# Patient Record
Sex: MALE | Race: WHITE | NOT HISPANIC OR LATINO | Employment: OTHER | URBAN - METROPOLITAN AREA
[De-identification: names, ages, dates, MRNs, and addresses within clinical notes are randomized per-mention and may not be internally consistent; named-entity substitution may affect disease eponyms.]

---

## 2024-05-29 ENCOUNTER — APPOINTMENT (OUTPATIENT)
Dept: RADIOLOGY | Facility: HOSPITAL | Age: 54
End: 2024-05-29
Payer: COMMERCIAL

## 2024-05-29 ENCOUNTER — HOSPITAL ENCOUNTER (OUTPATIENT)
Facility: HOSPITAL | Age: 54
Setting detail: OBSERVATION
Discharge: HOME | End: 2024-05-31
Attending: EMERGENCY MEDICINE | Admitting: INTERNAL MEDICINE
Payer: COMMERCIAL

## 2024-05-29 DIAGNOSIS — E87.20 LACTIC ACIDOSIS: ICD-10-CM

## 2024-05-29 DIAGNOSIS — K43.9 VENTRAL HERNIA WITHOUT OBSTRUCTION OR GANGRENE: ICD-10-CM

## 2024-05-29 DIAGNOSIS — R10.9 ABDOMINAL PAIN, UNSPECIFIED ABDOMINAL LOCATION: Primary | ICD-10-CM

## 2024-05-29 DIAGNOSIS — R19.7 DIARRHEA, UNSPECIFIED TYPE: ICD-10-CM

## 2024-05-29 LAB
ALBUMIN SERPL-MCNC: 2.9 G/DL (ref 3.5–5)
ALP BLD-CCNC: 178 U/L (ref 35–125)
ALT SERPL-CCNC: 17 U/L (ref 5–40)
ANION GAP SERPL CALC-SCNC: 16 MMOL/L
APPEARANCE UR: CLEAR
AST SERPL-CCNC: 81 U/L (ref 5–40)
BACTERIA #/AREA URNS AUTO: ABNORMAL /HPF
BASOPHILS # BLD AUTO: 0.06 X10*3/UL (ref 0–0.1)
BASOPHILS NFR BLD AUTO: 0.8 %
BILIRUB DIRECT SERPL-MCNC: 0.5 MG/DL (ref 0–0.2)
BILIRUB SERPL-MCNC: 2.6 MG/DL (ref 0.1–1.2)
BILIRUB UR STRIP.AUTO-MCNC: NEGATIVE MG/DL
BUN SERPL-MCNC: <3 MG/DL (ref 8–25)
CALCIUM SERPL-MCNC: 8 MG/DL (ref 8.5–10.4)
CHLORIDE SERPL-SCNC: 94 MMOL/L (ref 97–107)
CO2 SERPL-SCNC: 21 MMOL/L (ref 24–31)
COLOR UR: ABNORMAL
CREAT SERPL-MCNC: 0.4 MG/DL (ref 0.4–1.6)
EGFRCR SERPLBLD CKD-EPI 2021: >90 ML/MIN/1.73M*2
EOSINOPHIL # BLD AUTO: 0.03 X10*3/UL (ref 0–0.7)
EOSINOPHIL NFR BLD AUTO: 0.4 %
ERYTHROCYTE [DISTWIDTH] IN BLOOD BY AUTOMATED COUNT: 15.9 % (ref 11.5–14.5)
ETHANOL SERPL-MCNC: 0.05 G/DL
GLUCOSE SERPL-MCNC: 142 MG/DL (ref 65–99)
GLUCOSE UR STRIP.AUTO-MCNC: NORMAL MG/DL
HCT VFR BLD AUTO: 37.9 % (ref 41–52)
HGB BLD-MCNC: 12.8 G/DL (ref 13.5–17.5)
IMM GRANULOCYTES # BLD AUTO: 0.03 X10*3/UL (ref 0–0.7)
IMM GRANULOCYTES NFR BLD AUTO: 0.4 % (ref 0–0.9)
KETONES UR STRIP.AUTO-MCNC: ABNORMAL MG/DL
LACTATE BLDV-SCNC: 1.2 MMOL/L (ref 0.4–2)
LACTATE BLDV-SCNC: 4.5 MMOL/L (ref 0.4–2)
LEUKOCYTE ESTERASE UR QL STRIP.AUTO: NEGATIVE
LIPASE SERPL-CCNC: <16 U/L (ref 16–63)
LYMPHOCYTES # BLD AUTO: 1.4 X10*3/UL (ref 1.2–4.8)
LYMPHOCYTES NFR BLD AUTO: 17.9 %
MCH RBC QN AUTO: 34.7 PG (ref 26–34)
MCHC RBC AUTO-ENTMCNC: 33.8 G/DL (ref 32–36)
MCV RBC AUTO: 103 FL (ref 80–100)
MONOCYTES # BLD AUTO: 0.67 X10*3/UL (ref 0.1–1)
MONOCYTES NFR BLD AUTO: 8.6 %
MUCOUS THREADS #/AREA URNS AUTO: ABNORMAL /LPF
NEUTROPHILS # BLD AUTO: 5.62 X10*3/UL (ref 1.2–7.7)
NEUTROPHILS NFR BLD AUTO: 71.9 %
NITRITE UR QL STRIP.AUTO: ABNORMAL
NRBC BLD-RTO: 0 /100 WBCS (ref 0–0)
PH UR STRIP.AUTO: 6 [PH]
PLATELET # BLD AUTO: 151 X10*3/UL (ref 150–450)
POTASSIUM SERPL-SCNC: 3.9 MMOL/L (ref 3.4–5.1)
PROT SERPL-MCNC: 7.3 G/DL (ref 5.9–7.9)
PROT UR STRIP.AUTO-MCNC: NEGATIVE MG/DL
RBC # BLD AUTO: 3.69 X10*6/UL (ref 4.5–5.9)
RBC # UR STRIP.AUTO: NEGATIVE /UL
RBC #/AREA URNS AUTO: ABNORMAL /HPF
SODIUM SERPL-SCNC: 131 MMOL/L (ref 133–145)
SP GR UR STRIP.AUTO: 1
UROBILINOGEN UR STRIP.AUTO-MCNC: NORMAL MG/DL
WBC # BLD AUTO: 7.8 X10*3/UL (ref 4.4–11.3)
WBC #/AREA URNS AUTO: ABNORMAL /HPF

## 2024-05-29 PROCEDURE — 85025 COMPLETE CBC W/AUTO DIFF WBC: CPT | Performed by: CLINICAL NURSE SPECIALIST

## 2024-05-29 PROCEDURE — 96375 TX/PRO/DX INJ NEW DRUG ADDON: CPT

## 2024-05-29 PROCEDURE — 87040 BLOOD CULTURE FOR BACTERIA: CPT | Mod: WESLAB | Performed by: CLINICAL NURSE SPECIALIST

## 2024-05-29 PROCEDURE — 87086 URINE CULTURE/COLONY COUNT: CPT | Mod: WESLAB | Performed by: CLINICAL NURSE SPECIALIST

## 2024-05-29 PROCEDURE — 83605 ASSAY OF LACTIC ACID: CPT | Performed by: CLINICAL NURSE SPECIALIST

## 2024-05-29 PROCEDURE — 2500000004 HC RX 250 GENERAL PHARMACY W/ HCPCS (ALT 636 FOR OP/ED): Performed by: CLINICAL NURSE SPECIALIST

## 2024-05-29 PROCEDURE — 36415 COLL VENOUS BLD VENIPUNCTURE: CPT | Performed by: CLINICAL NURSE SPECIALIST

## 2024-05-29 PROCEDURE — G0378 HOSPITAL OBSERVATION PER HR: HCPCS

## 2024-05-29 PROCEDURE — 80053 COMPREHEN METABOLIC PANEL: CPT | Performed by: CLINICAL NURSE SPECIALIST

## 2024-05-29 PROCEDURE — 96365 THER/PROPH/DIAG IV INF INIT: CPT

## 2024-05-29 PROCEDURE — 96366 THER/PROPH/DIAG IV INF ADDON: CPT

## 2024-05-29 PROCEDURE — 2500000004 HC RX 250 GENERAL PHARMACY W/ HCPCS (ALT 636 FOR OP/ED): Performed by: INTERNAL MEDICINE

## 2024-05-29 PROCEDURE — 74176 CT ABD & PELVIS W/O CONTRAST: CPT

## 2024-05-29 PROCEDURE — 82077 ASSAY SPEC XCP UR&BREATH IA: CPT | Performed by: CLINICAL NURSE SPECIALIST

## 2024-05-29 PROCEDURE — 82248 BILIRUBIN DIRECT: CPT | Performed by: INTERNAL MEDICINE

## 2024-05-29 PROCEDURE — 74176 CT ABD & PELVIS W/O CONTRAST: CPT | Performed by: RADIOLOGY

## 2024-05-29 PROCEDURE — 96361 HYDRATE IV INFUSION ADD-ON: CPT

## 2024-05-29 PROCEDURE — 81003 URINALYSIS AUTO W/O SCOPE: CPT | Performed by: CLINICAL NURSE SPECIALIST

## 2024-05-29 PROCEDURE — 83690 ASSAY OF LIPASE: CPT | Performed by: CLINICAL NURSE SPECIALIST

## 2024-05-29 PROCEDURE — 96368 THER/DIAG CONCURRENT INF: CPT

## 2024-05-29 PROCEDURE — 99291 CRITICAL CARE FIRST HOUR: CPT | Performed by: CLINICAL NURSE SPECIALIST

## 2024-05-29 RX ORDER — ONDANSETRON HYDROCHLORIDE 2 MG/ML
4 INJECTION, SOLUTION INTRAVENOUS ONCE
Status: COMPLETED | OUTPATIENT
Start: 2024-05-29 | End: 2024-05-29

## 2024-05-29 RX ORDER — MORPHINE SULFATE 2 MG/ML
2 INJECTION, SOLUTION INTRAMUSCULAR; INTRAVENOUS EVERY 4 HOURS PRN
Status: DISCONTINUED | OUTPATIENT
Start: 2024-05-29 | End: 2024-05-31 | Stop reason: HOSPADM

## 2024-05-29 RX ORDER — PANTOPRAZOLE SODIUM 40 MG/1
40 TABLET, DELAYED RELEASE ORAL
COMMUNITY
Start: 2023-11-13

## 2024-05-29 RX ORDER — MORPHINE SULFATE 4 MG/ML
4 INJECTION, SOLUTION INTRAMUSCULAR; INTRAVENOUS ONCE
Status: COMPLETED | OUTPATIENT
Start: 2024-05-29 | End: 2024-05-29

## 2024-05-29 RX ORDER — ALPRAZOLAM 1 MG/1
1 TABLET ORAL 2 TIMES DAILY PRN
COMMUNITY
Start: 2024-03-19

## 2024-05-29 RX ORDER — CEFTRIAXONE 1 G/50ML
1 INJECTION, SOLUTION INTRAVENOUS ONCE
Status: COMPLETED | OUTPATIENT
Start: 2024-05-29 | End: 2024-05-29

## 2024-05-29 RX ORDER — ESCITALOPRAM OXALATE 10 MG/1
1 TABLET ORAL
COMMUNITY
Start: 2022-10-25 | End: 2024-05-30 | Stop reason: ENTERED-IN-ERROR

## 2024-05-29 RX ORDER — IBUPROFEN 100 MG/5ML
1000 SUSPENSION, ORAL (FINAL DOSE FORM) ORAL DAILY
COMMUNITY

## 2024-05-29 RX ORDER — QUETIAPINE FUMARATE 100 MG/1
50 TABLET, FILM COATED ORAL NIGHTLY
COMMUNITY
Start: 2023-01-09 | End: 2024-05-30 | Stop reason: ENTERED-IN-ERROR

## 2024-05-29 RX ORDER — METRONIDAZOLE 500 MG/100ML
500 INJECTION, SOLUTION INTRAVENOUS ONCE
Status: COMPLETED | OUTPATIENT
Start: 2024-05-29 | End: 2024-05-29

## 2024-05-29 RX ORDER — METOPROLOL TARTRATE 25 MG/1
25 TABLET, FILM COATED ORAL 2 TIMES DAILY
COMMUNITY
Start: 2023-11-22

## 2024-05-29 RX ADMIN — SODIUM CHLORIDE 1200 ML: 900 INJECTION, SOLUTION INTRAVENOUS at 17:37

## 2024-05-29 RX ADMIN — SODIUM CHLORIDE 1000 ML: 900 INJECTION, SOLUTION INTRAVENOUS at 17:02

## 2024-05-29 RX ADMIN — MORPHINE SULFATE 4 MG: 4 INJECTION, SOLUTION INTRAMUSCULAR; INTRAVENOUS at 17:10

## 2024-05-29 RX ADMIN — MORPHINE SULFATE 2 MG: 2 INJECTION, SOLUTION INTRAMUSCULAR; INTRAVENOUS at 22:33

## 2024-05-29 RX ADMIN — CEFTRIAXONE SODIUM 1 G: 1 INJECTION, SOLUTION INTRAVENOUS at 18:04

## 2024-05-29 RX ADMIN — ONDANSETRON HYDROCHLORIDE 4 MG: 2 INJECTION INTRAMUSCULAR; INTRAVENOUS at 17:10

## 2024-05-29 RX ADMIN — METRONIDAZOLE 500 MG: 500 INJECTION, SOLUTION INTRAVENOUS at 18:31

## 2024-05-29 ASSESSMENT — LIFESTYLE VARIABLES
EVER FELT BAD OR GUILTY ABOUT YOUR DRINKING: NO
HAVE PEOPLE ANNOYED YOU BY CRITICIZING YOUR DRINKING: NO
EVER HAD A DRINK FIRST THING IN THE MORNING TO STEADY YOUR NERVES TO GET RID OF A HANGOVER: NO
HAVE YOU EVER FELT YOU SHOULD CUT DOWN ON YOUR DRINKING: NO
TOTAL SCORE: 0

## 2024-05-29 ASSESSMENT — PAIN SCALES - GENERAL: PAINLEVEL_OUTOF10: 10 - WORST POSSIBLE PAIN

## 2024-05-29 ASSESSMENT — PAIN DESCRIPTION - DESCRIPTORS: DESCRIPTORS: STABBING

## 2024-05-29 ASSESSMENT — PAIN - FUNCTIONAL ASSESSMENT: PAIN_FUNCTIONAL_ASSESSMENT: 0-10

## 2024-05-29 ASSESSMENT — PAIN DESCRIPTION - LOCATION: LOCATION: ABDOMEN

## 2024-05-29 NOTE — ED PROVIDER NOTES
Department of Emergency Medicine   ED  Provider Note  Admit Date/RoomTime: 5/29/2024  4:45 PM  [facility]        History of Present Illness:  Chief Complaint   Patient presents with    Abdominal Pain         Nils Hogan is a 54 y.o. male history of medical procedure, TIA, stroke presenting to the ED for abdominal pain diarrhea, started yesterday.  History of focal procedure status post patient reports cancer  Patient reports he had a fall couple of days ago.  Hitting his knee.  Denies hitting his head.  Reports that on Tuesday started having abdominal pain followed by diarrhea after eating meatballs that did not taste right but ate them anyway   From a party.  He denies chest pain.  No fever chills no cough congestion.  No pressure burning frequency with urination.  No nausea or vomiting patient has a history of liver procedure has a wound to his abdomen that has been present for the past 9 months.  Earlier today umbilicus area large.  Patient notes pain is all right mid quadrant alongside the hernia he denies hitting his abdomen when he fell  Review of Systems:   Pertinent positives and negatives are stated within HPI, all other systems reviewed and are negative.        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  Past Surgical History:  has a past surgical history that includes Adenoidectomy; Whipple procedure (04/22/2019); and Urethra surgery.  Social History:  reports that he has been smoking cigarettes. He started smoking about 37 years ago. He does not have any smokeless tobacco history on file. He reports current alcohol use.  Family History: family history includes Asthma in his son; Bipolar  disorder in his mother; Colon cancer in his paternal grandfather; Hyperlipidemia in his sister; Hypertension in his sister; Prostate cancer in his father; Stroke in his mother.. Unless otherwise noted, family history is non contributory  The patient’s home medications have been reviewed.  Allergies: Diazepam, Iodinated contrast media, Iodine, Penicillins, Acetaminophen, and Phenytoin sodium extended        ---------------------------------------------------PHYSICAL EXAM--------------------------------------    GENERAL APPEARANCE: Awake and alert.   VITAL SIGNS: As per the nurses' triage record.   HEENT: Normocephalic, atraumatic. Extraocular muscles are intact. Pupils equal round and reactive to light. Conjunctiva are pink. Negative scleral icterus. Mucous membranes are moist. Tongue in the midline. Pharynx was without erythema or exudates, uvula midline  NECK: Soft Nontender and supple, full gross ROM, no meningeal signs.  CHEST: Nontender to palpation. Clear to auscultation bilaterally. No rales, rhonchi, or wheezing.   HEART: S1, S2. Regular rate and rhythm. No murmurs, gallops or rubs.  Strong and equal pulses in the extremities.   ABDOMEN:   Large umbilical hernia open wound approximately 3 x 2 cm open wound.  No drainage or odor noted.  No lymphangitic streaking noted.  Limited exam patient is guarded hollers when the abdomen is palpated lightly.  Bowel sounds are distant  Back: No pain palpation of the cervical thoracic or lumbar spine  Paresthesias  MUSCULCSKELETAL: The calves are nontender to palpation. Full gross active range of motion.    NEUROLOGICAL: Awake, alert and oriented x 3. Power intact in the upper and lower extremities. Sensation is intact to light touch in the upper and lower extremities.   IMMUNOLOGICAL: No lymphatic streaking noted   DERM: No petechiae, rashes, or ecchymoses.          ------------------------- NURSING NOTES AND VITALS REVIEWED ---------------------------  The nursing notes  "within the ED encounter and vital signs as below have been reviewed by myself  /76   Pulse 91   Temp 37.1 °C (98.8 °F)   Resp 19   Ht 1.727 m (5' 8\")   Wt 73.5 kg (162 lb)   SpO2 97%   BMI 24.63 kg/m²     Oxygen Saturation Interpretation: 96% room air    The cardiac monitor revealed sinus tachycardia with a heart rate in the 100s as interpreted by me. The cardiac monitor was ordered secondary to the patient's heart rate and to monitor the patient for dysrhythmia.       The patient’s available past medical records and past encounters were reviewed.          -----------------------DIAGNOSTIC RESULTS------------------------  LABS:    Labs Reviewed   CBC WITH AUTO DIFFERENTIAL - Abnormal       Result Value    WBC 7.8      nRBC 0.0      RBC 3.69 (*)     Hemoglobin 12.8 (*)     Hematocrit 37.9 (*)      (*)     MCH 34.7 (*)     MCHC 33.8      RDW 15.9 (*)     Platelets 151      Neutrophils % 71.9      Immature Granulocytes %, Automated 0.4      Lymphocytes % 17.9      Monocytes % 8.6      Eosinophils % 0.4      Basophils % 0.8      Neutrophils Absolute 5.62      Immature Granulocytes Absolute, Automated 0.03      Lymphocytes Absolute 1.40      Monocytes Absolute 0.67      Eosinophils Absolute 0.03      Basophils Absolute 0.06     COMPREHENSIVE METABOLIC PANEL - Abnormal    Glucose 142 (*)     Sodium 131 (*)     Potassium 3.9      Chloride 94 (*)     Bicarbonate 21 (*)     Urea Nitrogen <3 (*)     Creatinine 0.40      eGFR >90      Calcium 8.0 (*)     Albumin 2.9 (*)     Alkaline Phosphatase 178 (*)     Total Protein 7.3      AST 81 (*)     Bilirubin, Total 2.6 (*)     ALT 17      Anion Gap 16     LIPASE - Abnormal    Lipase <16 (*)    BLOOD GAS LACTIC ACID, VENOUS - Abnormal    POCT Lactate, Venous 4.5 (*)    URINALYSIS WITH REFLEX CULTURE AND MICROSCOPIC - Abnormal    Color, Urine Light-Yellow      Appearance, Urine Clear      Specific Gravity, Urine 1.004 (*)     pH, Urine 6.0      Protein, Urine " NEGATIVE      Glucose, Urine Normal      Blood, Urine NEGATIVE      Ketones, Urine TRACE (*)     Bilirubin, Urine NEGATIVE      Urobilinogen, Urine Normal      Nitrite, Urine 2+ (*)     Leukocyte Esterase, Urine NEGATIVE     ALCOHOL - Abnormal    Alcohol 0.051 (*)    BILIRUBIN, DIRECT - Abnormal    Bilirubin, Direct 0.5 (*)    MICROSCOPIC ONLY, URINE - Abnormal    WBC, Urine 1-5      RBC, Urine 1-2      Bacteria, Urine 4+ (*)     Mucus, Urine FEW     BLOOD GAS LACTIC ACID, VENOUS - Normal    POCT Lactate, Venous 1.2     BLOOD CULTURE   BLOOD CULTURE   TISSUE/WOUND CULTURE/SMEAR   URINE CULTURE   URINALYSIS WITH REFLEX CULTURE AND MICROSCOPIC    Narrative:     The following orders were created for panel order Urinalysis with Reflex Culture and Microscopic.  Procedure                               Abnormality         Status                     ---------                               -----------         ------                     Urinalysis with Reflex C...[[id number]]  Abnormal            Final result               Extra Urine Gray Tube[[id number]]                            In process                   Please view results for these tests on the individual orders.   EXTRA URINE GRAY TUBE   CBC   HEPATIC FUNCTION PANEL   BASIC METABOLIC PANEL       As interpreted by me, the above displayed labs are abnormal. All other labs obtained during this visit were within normal range or not returned as of this dictation.            CT abdomen pelvis wo IV contrast   Final Result   Large ventral hernia containing fat and bowel.        Small umbilical hernia and adjacent right paraumbilical hernia.        Diffuse mesenteric edema with small volume ascites.        Diffuse colonic diverticulosis.        Status post Whipple procedure.        Questionable cirrhotic liver morphology.        Diffuse bladder wall thickening is again noted. Bilateral Hutch   diverticula are again noted.        Signed by: Diane Hernandez 5/29/2024 6:38 PM    Dictation workstation:   [id number]              CT abdomen pelvis wo IV contrast   Final Result   Large ventral hernia containing fat and bowel.        Small umbilical hernia and adjacent right paraumbilical hernia.        Diffuse mesenteric edema with small volume ascites.        Diffuse colonic diverticulosis.        Status post Whipple procedure.        Questionable cirrhotic liver morphology.        Diffuse bladder wall thickening is again noted. Bilateral Hutch   diverticula are again noted.        Signed by: Diane Hernandez 5/29/2024 6:38 PM   Dictation workstation:   [id number]              ------------------------------ ED COURSE/MEDICAL DECISION MAKING----------------------  Medical Decision Making:   Exam: A medically appropriate exam performed, outlined above, given the known history and presentation.    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       CC/HPI Summary, Social Determinants of health, Records Reviewed, DDx, testing done/not done, ED Course, Reassessment, disposition considerations/shared decision making with patient, consults, disposition:   Presents with abdominal pain after eating meatballs that did not taste normal.  History of Whipple procedure open wound   Plan  Rocephin  Flagyl  Morphine  Zofran  Normal saline 30 mg/kg  CT abdomen pelvis without contrast patient has allergy to the dye anaphylaxis.  Due to concern for obstruction obtained emergently: Large ventral hernia containing fat and bowel.  Small umbilical hernia and adjacent right paraumbilical hernia.  Diffuse mesenteric  edema with small volume ascites.  Diffuse colonic diverticulosis.  Status post Whipple procedure.  Questionable cirrhotic liver morphology.  Diffuse bladder wall thickening is again noted. Bilateral Hutch  diverticula are again noted.  Blood cultures  Lactic acid  CBC  CMP  Urine  Medical Decision Making/Differential Diagnosis:  Differentials include but not limited to hernia versus obstruction versus mass versus abscess versus UTI versus food poisoning  Reviewed  Lipase less than 16  Hemoglobin 12.8 glucose 142  Sodium 131  Chloride 94  Bicarb 21  BUN less than 3  Creatinine 0.4  Alkaline phosphatase elevated  Calcium 8  AST elevated 81  Total bili 2.6  , Lactic acid 4.5 repeat 1.2  Patient presented with abdominal pain diarrhea.  Open wound noted to the abdomen which is.  Evidence tender.  CT of the abdomen pelvis showed large ventral hernia containing fat and bowel, small umbilical hernia recent right periumbilical hernia, diffuse mesenteric edema with small volume ascites, diffuse colonic diverticulosis, status post Whipple procedure, questionable cirrhotic liver morphology, diffuse bladder wall thickening history of the same bilateral Hutch diverticula also noted history of the same.  Lactic acid was noted to be elevated at 4.5.  Patient received Rocephin and Flagyl secondary to Zosyn allergy of anaphylaxis.  CT of the abdomen pelvis was not obtained secondary to IV contrast dye allergy.  And emergent need for CAT scan to determine obstruction.  30 mg/kg IV fluids ordered white blood cell count is not elevated.  Hemoglobin 12.8.  No signs of active bleeding.  Sodium 131 chloride 94 patient not hypoglycemic.  Mild electrolyte imbalance noted.  Renal function improved.  Patient does have an elevated bili total 2.6 with elevated AST history of the same improved from baseline.  Alkaline phosphatase is elevated from baseline bili total elevated from baseline.  Patient is not jaundiced.  Urine was pending upon  admission.  Noted to have nitrites +4 bacteria patient did receive Zosyn for concern of abdominal infection.  Culture pending bili total 0.5 alcohol 0.051.  Abdominal pain after eating lactic acidosis did not improve limits unsure etiology of his lactic acidosis.  Patient continues to have abdominal pain.  Discussed case with general surgery.  By attending physician.  Due to his abdominal pain lactic acidosis will plan for admission for further evaluation and treatment and serial abdominal exams.  Patient also known to have cirrhosis with liver with elevated LFTs.  Patient seen and evaluated attending physician Dr. Love  Case discussed with hospitalist agreed admit the patient under his service for further evaluation and treatment.  PROCEDURES  Unless otherwise noted below, none      CONSULTS:   IP CONSULT TO ACUTE CARE SURGERY  Consult general surgery. Dr. Figueroa     ED Course as of 05/30/24 0041   Wed May 29, 2024   1718 Concern for obstruction.  Lactic acid 4.5.  Surgery has already been consulted pending CT results.  Added additional fluids to equal 30 mg/kg.  Antibiotics Zosyn vancomycin blood cultures pending [TB]   2024 Discussed case with hospitalist Dr. Schroeder is agreed to admit the patient under his service for further evaluation and treatment.  Request alcohol alcohol level be drawn.  This was ordered admit to stepdown bed  [TB]      ED Course User Index  [TB] DEJA Combs         Diagnoses as of 05/30/24 0041   Abdominal pain, unspecified abdominal location   Ventral hernia without obstruction or gangrene   Diarrhea, unspecified type   Lactic acidosis         This patient has remained hemodynamically stable during their ED course.      Critical Care: 31  Critical Care    Performed by: DEJA Combs  Authorized by: Latha Love DO    Critical care provider statement:     Critical care time (minutes):  31    Critical care time was exclusive of:  Separately billable procedures  and treating other patients and teaching time    Critical care was necessary to treat or prevent imminent or life-threatening deterioration of the following conditions:  Sepsis    Critical care was time spent personally by me on the following activities:  Ordering and performing treatments and interventions, ordering and review of laboratory studies, ordering and review of radiographic studies, pulse oximetry, re-evaluation of patient's condition, review of old charts, blood draw for specimens, development of treatment plan with patient or surrogate, discussions with consultants, evaluation of patient's response to treatment, examination of patient and interpretation of cardiac output measurements    Care discussed with: admitting provider      Critical care time for concern of sepsis and possible bowel obstruction  Initial presentation requiring IV fluids lactic acidosis noted blood cultures pending      Counseling:  The emergency provider has spoken with the patient and discussed today’s results, in addition to providing specific details for the plan of care and counseling regarding the diagnosis and prognosis.  Questions are answered at this time and they are agreeable with the plan.         --------------------------------- IMPRESSION AND DISPOSITION ---------------------------------    IMPRESSION  1. Abdominal pain, unspecified abdominal location    2. Ventral hernia without obstruction or gangrene    3. Diarrhea, unspecified type    4. Lactic acidosis        DISPOSITION  Disposition: Admit hospitalist service  Patient condition is stable        NOTE: This report was transcribed using voice recognition software. Every effort was made to ensure accuracy; however, inadvertent computerized transcription errors may be present      KIMBERLEY Combs-NITIN  05/30/24 0043

## 2024-05-29 NOTE — ED TRIAGE NOTES
Patient presents to the emergency department with abdominal pain. Patient states that he ate some meatballs that might've been old. Patient also has c/o diarrhea. Patient has hx of a stroke with left sided deficits, whipple procedure, and has a hernia on his abdomen that has been going on for the last 9 months.

## 2024-05-30 PROBLEM — K43.9 VENTRAL HERNIA WITHOUT OBSTRUCTION OR GANGRENE: Status: ACTIVE | Noted: 2024-05-30

## 2024-05-30 PROBLEM — R19.7 DIARRHEA: Status: ACTIVE | Noted: 2024-05-30

## 2024-05-30 PROBLEM — E87.20 LACTIC ACIDOSIS: Status: ACTIVE | Noted: 2024-05-30

## 2024-05-30 LAB
ALBUMIN SERPL-MCNC: 2.7 G/DL (ref 3.5–5)
ALP BLD-CCNC: 151 U/L (ref 35–125)
ALT SERPL-CCNC: 14 U/L (ref 5–40)
ANION GAP SERPL CALC-SCNC: 12 MMOL/L
AST SERPL-CCNC: 61 U/L (ref 5–40)
BILIRUB DIRECT SERPL-MCNC: 1.3 MG/DL (ref 0–0.2)
BILIRUB SERPL-MCNC: 2.4 MG/DL (ref 0.1–1.2)
BUN SERPL-MCNC: 5 MG/DL (ref 8–25)
CALCIUM SERPL-MCNC: 7.5 MG/DL (ref 8.5–10.4)
CHLORIDE SERPL-SCNC: 102 MMOL/L (ref 97–107)
CO2 SERPL-SCNC: 21 MMOL/L (ref 24–31)
CREAT SERPL-MCNC: 0.4 MG/DL (ref 0.4–1.6)
EGFRCR SERPLBLD CKD-EPI 2021: >90 ML/MIN/1.73M*2
ERYTHROCYTE [DISTWIDTH] IN BLOOD BY AUTOMATED COUNT: 15 % (ref 11.5–14.5)
GLUCOSE SERPL-MCNC: 91 MG/DL (ref 65–99)
HCT VFR BLD AUTO: 36.4 % (ref 41–52)
HGB BLD-MCNC: 12.3 G/DL (ref 13.5–17.5)
HOLD SPECIMEN: NORMAL
MCH RBC QN AUTO: 33.9 PG (ref 26–34)
MCHC RBC AUTO-ENTMCNC: 33.8 G/DL (ref 32–36)
MCV RBC AUTO: 100 FL (ref 80–100)
NRBC BLD-RTO: 0 /100 WBCS (ref 0–0)
PLATELET # BLD AUTO: 111 X10*3/UL (ref 150–450)
POTASSIUM SERPL-SCNC: 3.9 MMOL/L (ref 3.4–5.1)
PROT SERPL-MCNC: 6.6 G/DL (ref 5.9–7.9)
RBC # BLD AUTO: 3.63 X10*6/UL (ref 4.5–5.9)
SODIUM SERPL-SCNC: 135 MMOL/L (ref 133–145)
WBC # BLD AUTO: 5.7 X10*3/UL (ref 4.4–11.3)

## 2024-05-30 PROCEDURE — 97162 PT EVAL MOD COMPLEX 30 MIN: CPT | Mod: GP

## 2024-05-30 PROCEDURE — 85027 COMPLETE CBC AUTOMATED: CPT | Performed by: INTERNAL MEDICINE

## 2024-05-30 PROCEDURE — 80076 HEPATIC FUNCTION PANEL: CPT | Performed by: INTERNAL MEDICINE

## 2024-05-30 PROCEDURE — 99221 1ST HOSP IP/OBS SF/LOW 40: CPT | Performed by: NURSE PRACTITIONER

## 2024-05-30 PROCEDURE — G0378 HOSPITAL OBSERVATION PER HR: HCPCS

## 2024-05-30 PROCEDURE — 2500000004 HC RX 250 GENERAL PHARMACY W/ HCPCS (ALT 636 FOR OP/ED): Performed by: INTERNAL MEDICINE

## 2024-05-30 PROCEDURE — 96367 TX/PROPH/DG ADDL SEQ IV INF: CPT

## 2024-05-30 PROCEDURE — 96372 THER/PROPH/DIAG INJ SC/IM: CPT | Performed by: INTERNAL MEDICINE

## 2024-05-30 PROCEDURE — 2500000002 HC RX 250 W HCPCS SELF ADMINISTERED DRUGS (ALT 637 FOR MEDICARE OP, ALT 636 FOR OP/ED): Performed by: INTERNAL MEDICINE

## 2024-05-30 PROCEDURE — 97535 SELF CARE MNGMENT TRAINING: CPT | Mod: GO

## 2024-05-30 PROCEDURE — 36415 COLL VENOUS BLD VENIPUNCTURE: CPT | Performed by: INTERNAL MEDICINE

## 2024-05-30 PROCEDURE — 97166 OT EVAL MOD COMPLEX 45 MIN: CPT | Mod: GO

## 2024-05-30 PROCEDURE — 2500000001 HC RX 250 WO HCPCS SELF ADMINISTERED DRUGS (ALT 637 FOR MEDICARE OP): Performed by: INTERNAL MEDICINE

## 2024-05-30 PROCEDURE — 96376 TX/PRO/DX INJ SAME DRUG ADON: CPT

## 2024-05-30 RX ORDER — ENOXAPARIN SODIUM 100 MG/ML
40 INJECTION SUBCUTANEOUS DAILY
Status: DISCONTINUED | OUTPATIENT
Start: 2024-05-30 | End: 2024-05-31 | Stop reason: HOSPADM

## 2024-05-30 RX ORDER — METOPROLOL TARTRATE 25 MG/1
25 TABLET, FILM COATED ORAL 2 TIMES DAILY
Status: DISCONTINUED | OUTPATIENT
Start: 2024-05-30 | End: 2024-05-31 | Stop reason: HOSPADM

## 2024-05-30 RX ORDER — LANOLIN ALCOHOL/MO/W.PET/CERES
100 CREAM (GRAM) TOPICAL DAILY
Status: DISCONTINUED | OUTPATIENT
Start: 2024-05-30 | End: 2024-05-31 | Stop reason: HOSPADM

## 2024-05-30 RX ORDER — ASPIRIN 81 MG/1
81 TABLET ORAL DAILY PRN
COMMUNITY

## 2024-05-30 RX ORDER — ASCORBIC ACID 500 MG
1000 TABLET ORAL DAILY
Status: DISCONTINUED | OUTPATIENT
Start: 2024-05-30 | End: 2024-05-31 | Stop reason: HOSPADM

## 2024-05-30 RX ORDER — POLYETHYLENE GLYCOL 3350 17 G/17G
17 POWDER, FOR SOLUTION ORAL DAILY PRN
Status: DISCONTINUED | OUTPATIENT
Start: 2024-05-30 | End: 2024-05-31 | Stop reason: HOSPADM

## 2024-05-30 RX ORDER — ALPRAZOLAM 0.5 MG/1
1 TABLET ORAL 2 TIMES DAILY PRN
Status: DISCONTINUED | OUTPATIENT
Start: 2024-05-30 | End: 2024-05-31 | Stop reason: HOSPADM

## 2024-05-30 RX ORDER — PANTOPRAZOLE SODIUM 40 MG/1
40 TABLET, DELAYED RELEASE ORAL
Status: DISCONTINUED | OUTPATIENT
Start: 2024-05-30 | End: 2024-05-31 | Stop reason: HOSPADM

## 2024-05-30 RX ORDER — MEROPENEM 1 G/1
1 INJECTION, POWDER, FOR SOLUTION INTRAVENOUS EVERY 8 HOURS
Status: DISCONTINUED | OUTPATIENT
Start: 2024-05-30 | End: 2024-05-31 | Stop reason: HOSPADM

## 2024-05-30 RX ORDER — ACETAMINOPHEN 500 MG
2000 TABLET ORAL DAILY
COMMUNITY

## 2024-05-30 RX ORDER — ZINC GLUCONATE 50 MG
50 TABLET ORAL DAILY
COMMUNITY

## 2024-05-30 RX ORDER — QUETIAPINE FUMARATE 50 MG/1
50 TABLET, FILM COATED ORAL NIGHTLY
Status: DISCONTINUED | OUTPATIENT
Start: 2024-05-30 | End: 2024-05-31 | Stop reason: HOSPADM

## 2024-05-30 RX ORDER — PROMETHAZINE HYDROCHLORIDE 25 MG/1
25 TABLET ORAL EVERY 6 HOURS PRN
Status: DISCONTINUED | OUTPATIENT
Start: 2024-05-30 | End: 2024-05-31 | Stop reason: HOSPADM

## 2024-05-30 RX ORDER — TALC
3 POWDER (GRAM) TOPICAL NIGHTLY PRN
Status: DISCONTINUED | OUTPATIENT
Start: 2024-05-30 | End: 2024-05-31 | Stop reason: HOSPADM

## 2024-05-30 RX ORDER — MORPHINE SULFATE 4 MG/ML
4 INJECTION, SOLUTION INTRAMUSCULAR; INTRAVENOUS EVERY 4 HOURS PRN
Status: DISCONTINUED | OUTPATIENT
Start: 2024-05-30 | End: 2024-05-31 | Stop reason: HOSPADM

## 2024-05-30 RX ORDER — ESCITALOPRAM OXALATE 10 MG/1
10 TABLET ORAL
Status: DISCONTINUED | OUTPATIENT
Start: 2024-05-30 | End: 2024-05-31 | Stop reason: HOSPADM

## 2024-05-30 RX ORDER — PROMETHAZINE HYDROCHLORIDE 25 MG/1
25 SUPPOSITORY RECTAL EVERY 12 HOURS PRN
Status: DISCONTINUED | OUTPATIENT
Start: 2024-05-30 | End: 2024-05-31 | Stop reason: HOSPADM

## 2024-05-30 RX ADMIN — OXYCODONE HYDROCHLORIDE AND ACETAMINOPHEN 1000 MG: 500 TABLET ORAL at 08:58

## 2024-05-30 RX ADMIN — ENOXAPARIN SODIUM 40 MG: 40 INJECTION SUBCUTANEOUS at 08:58

## 2024-05-30 RX ADMIN — MEROPENEM 1 G: 1 INJECTION, POWDER, FOR SOLUTION INTRAVENOUS at 05:46

## 2024-05-30 RX ADMIN — MEROPENEM 1 G: 1 INJECTION, POWDER, FOR SOLUTION INTRAVENOUS at 12:22

## 2024-05-30 RX ADMIN — Medication 100 MG: at 08:58

## 2024-05-30 RX ADMIN — MORPHINE SULFATE 4 MG: 4 INJECTION, SOLUTION INTRAMUSCULAR; INTRAVENOUS at 16:56

## 2024-05-30 RX ADMIN — MORPHINE SULFATE 2 MG: 2 INJECTION, SOLUTION INTRAMUSCULAR; INTRAVENOUS at 05:46

## 2024-05-30 RX ADMIN — MEROPENEM 1 G: 1 INJECTION, POWDER, FOR SOLUTION INTRAVENOUS at 20:47

## 2024-05-30 RX ADMIN — MORPHINE SULFATE 4 MG: 4 INJECTION, SOLUTION INTRAMUSCULAR; INTRAVENOUS at 12:22

## 2024-05-30 RX ADMIN — METOPROLOL TARTRATE 25 MG: 25 TABLET, FILM COATED ORAL at 08:57

## 2024-05-30 RX ADMIN — ALPRAZOLAM 1 MG: 0.5 TABLET ORAL at 18:45

## 2024-05-30 RX ADMIN — METOPROLOL TARTRATE 25 MG: 25 TABLET, FILM COATED ORAL at 20:44

## 2024-05-30 RX ADMIN — PANTOPRAZOLE SODIUM 40 MG: 40 TABLET, DELAYED RELEASE ORAL at 08:58

## 2024-05-30 SDOH — ECONOMIC STABILITY: INCOME INSECURITY: HOW HARD IS IT FOR YOU TO PAY FOR THE VERY BASICS LIKE FOOD, HOUSING, MEDICAL CARE, AND HEATING?: HARD

## 2024-05-30 SDOH — HEALTH STABILITY: MENTAL HEALTH
STRESS IS WHEN SOMEONE FEELS TENSE, NERVOUS, ANXIOUS, OR CAN'T SLEEP AT NIGHT BECAUSE THEIR MIND IS TROUBLED. HOW STRESSED ARE YOU?: VERY MUCH

## 2024-05-30 SDOH — HEALTH STABILITY: PHYSICAL HEALTH: ON AVERAGE, HOW MANY MINUTES DO YOU ENGAGE IN EXERCISE AT THIS LEVEL?: 0 MIN

## 2024-05-30 SDOH — SOCIAL STABILITY: SOCIAL NETWORK: ARE YOU MARRIED, WIDOWED, DIVORCED, SEPARATED, NEVER MARRIED, OR LIVING WITH A PARTNER?: DIVORCED

## 2024-05-30 SDOH — SOCIAL STABILITY: SOCIAL NETWORK
DO YOU BELONG TO ANY CLUBS OR ORGANIZATIONS SUCH AS CHURCH GROUPS UNIONS, FRATERNAL OR ATHLETIC GROUPS, OR SCHOOL GROUPS?: NO

## 2024-05-30 SDOH — SOCIAL STABILITY: SOCIAL INSECURITY: WITHIN THE LAST YEAR, HAVE YOU BEEN HUMILIATED OR EMOTIONALLY ABUSED IN OTHER WAYS BY YOUR PARTNER OR EX-PARTNER?: NO

## 2024-05-30 SDOH — SOCIAL STABILITY: SOCIAL INSECURITY: DOES ANYONE TRY TO KEEP YOU FROM HAVING/CONTACTING OTHER FRIENDS OR DOING THINGS OUTSIDE YOUR HOME?: NO

## 2024-05-30 SDOH — ECONOMIC STABILITY: HOUSING INSECURITY
IN THE LAST 12 MONTHS, WAS THERE A TIME WHEN YOU DID NOT HAVE A STEADY PLACE TO SLEEP OR SLEPT IN A SHELTER (INCLUDING NOW)?: NO

## 2024-05-30 SDOH — SOCIAL STABILITY: SOCIAL INSECURITY
WITHIN THE LAST YEAR, HAVE YOU BEEN KICKED, HIT, SLAPPED, OR OTHERWISE PHYSICALLY HURT BY YOUR PARTNER OR EX-PARTNER?: NO

## 2024-05-30 SDOH — SOCIAL STABILITY: SOCIAL INSECURITY: ARE YOU OR HAVE YOU BEEN THREATENED OR ABUSED PHYSICALLY, EMOTIONALLY, OR SEXUALLY BY ANYONE?: NO

## 2024-05-30 SDOH — SOCIAL STABILITY: SOCIAL NETWORK
IN A TYPICAL WEEK, HOW MANY TIMES DO YOU TALK ON THE PHONE WITH FAMILY, FRIENDS, OR NEIGHBORS?: MORE THAN THREE TIMES A WEEK

## 2024-05-30 SDOH — ECONOMIC STABILITY: INCOME INSECURITY: IN THE LAST 12 MONTHS, WAS THERE A TIME WHEN YOU WERE NOT ABLE TO PAY THE MORTGAGE OR RENT ON TIME?: NO

## 2024-05-30 SDOH — ECONOMIC STABILITY: FOOD INSECURITY: WITHIN THE PAST 12 MONTHS, THE FOOD YOU BOUGHT JUST DIDN'T LAST AND YOU DIDN'T HAVE MONEY TO GET MORE.: NEVER TRUE

## 2024-05-30 SDOH — SOCIAL STABILITY: SOCIAL INSECURITY
WITHIN THE LAST YEAR, HAVE TO BEEN RAPED OR FORCED TO HAVE ANY KIND OF SEXUAL ACTIVITY BY YOUR PARTNER OR EX-PARTNER?: NO

## 2024-05-30 SDOH — ECONOMIC STABILITY: HOUSING INSECURITY: IN THE LAST 12 MONTHS, HOW MANY PLACES HAVE YOU LIVED?: 1

## 2024-05-30 SDOH — ECONOMIC STABILITY: FOOD INSECURITY: WITHIN THE PAST 12 MONTHS, YOU WORRIED THAT YOUR FOOD WOULD RUN OUT BEFORE YOU GOT MONEY TO BUY MORE.: NEVER TRUE

## 2024-05-30 SDOH — SOCIAL STABILITY: SOCIAL INSECURITY: HAS ANYONE EVER THREATENED TO HURT YOUR FAMILY OR YOUR PETS?: NO

## 2024-05-30 SDOH — SOCIAL STABILITY: SOCIAL NETWORK: HOW OFTEN DO YOU GET TOGETHER WITH FRIENDS OR RELATIVES?: MORE THAN THREE TIMES A WEEK

## 2024-05-30 SDOH — SOCIAL STABILITY: SOCIAL INSECURITY: WERE YOU ABLE TO COMPLETE ALL THE BEHAVIORAL HEALTH SCREENINGS?: YES

## 2024-05-30 SDOH — HEALTH STABILITY: MENTAL HEALTH: HOW OFTEN DO YOU HAVE 6 OR MORE DRINKS ON ONE OCCASION?: NEVER

## 2024-05-30 SDOH — SOCIAL STABILITY: SOCIAL INSECURITY: HAVE YOU HAD ANY THOUGHTS OF HARMING ANYONE ELSE?: NO

## 2024-05-30 SDOH — HEALTH STABILITY: MENTAL HEALTH: HOW OFTEN DO YOU HAVE A DRINK CONTAINING ALCOHOL?: NEVER

## 2024-05-30 SDOH — SOCIAL STABILITY: SOCIAL INSECURITY: DO YOU FEEL ANYONE HAS EXPLOITED OR TAKEN ADVANTAGE OF YOU FINANCIALLY OR OF YOUR PERSONAL PROPERTY?: NO

## 2024-05-30 SDOH — ECONOMIC STABILITY: TRANSPORTATION INSECURITY
IN THE PAST 12 MONTHS, HAS LACK OF TRANSPORTATION KEPT YOU FROM MEETINGS, WORK, OR FROM GETTING THINGS NEEDED FOR DAILY LIVING?: NO

## 2024-05-30 SDOH — SOCIAL STABILITY: SOCIAL NETWORK: HOW OFTEN DO YOU ATTENT MEETINGS OF THE CLUB OR ORGANIZATION YOU BELONG TO?: NEVER

## 2024-05-30 SDOH — SOCIAL STABILITY: SOCIAL NETWORK: HOW OFTEN DO YOU ATTEND CHURCH OR RELIGIOUS SERVICES?: NEVER

## 2024-05-30 SDOH — SOCIAL STABILITY: SOCIAL INSECURITY: ARE THERE ANY APPARENT SIGNS OF INJURIES/BEHAVIORS THAT COULD BE RELATED TO ABUSE/NEGLECT?: NO

## 2024-05-30 SDOH — ECONOMIC STABILITY: TRANSPORTATION INSECURITY
IN THE PAST 12 MONTHS, HAS THE LACK OF TRANSPORTATION KEPT YOU FROM MEDICAL APPOINTMENTS OR FROM GETTING MEDICATIONS?: NO

## 2024-05-30 SDOH — SOCIAL STABILITY: SOCIAL INSECURITY: HAVE YOU HAD THOUGHTS OF HARMING ANYONE ELSE?: NO

## 2024-05-30 SDOH — SOCIAL STABILITY: SOCIAL INSECURITY: WITHIN THE LAST YEAR, HAVE YOU BEEN AFRAID OF YOUR PARTNER OR EX-PARTNER?: NO

## 2024-05-30 SDOH — SOCIAL STABILITY: SOCIAL INSECURITY: DO YOU FEEL UNSAFE GOING BACK TO THE PLACE WHERE YOU ARE LIVING?: NO

## 2024-05-30 SDOH — ECONOMIC STABILITY: INCOME INSECURITY: IN THE PAST 12 MONTHS, HAS THE ELECTRIC, GAS, OIL, OR WATER COMPANY THREATENED TO SHUT OFF SERVICE IN YOUR HOME?: YES

## 2024-05-30 SDOH — HEALTH STABILITY: PHYSICAL HEALTH: ON AVERAGE, HOW MANY DAYS PER WEEK DO YOU ENGAGE IN MODERATE TO STRENUOUS EXERCISE (LIKE A BRISK WALK)?: 0 DAYS

## 2024-05-30 SDOH — HEALTH STABILITY: MENTAL HEALTH: HOW MANY STANDARD DRINKS CONTAINING ALCOHOL DO YOU HAVE ON A TYPICAL DAY?: PATIENT DOES NOT DRINK

## 2024-05-30 SDOH — SOCIAL STABILITY: SOCIAL INSECURITY: ABUSE: ADULT

## 2024-05-30 ASSESSMENT — COGNITIVE AND FUNCTIONAL STATUS - GENERAL
STANDING UP FROM CHAIR USING ARMS: TOTAL
MOVING FROM LYING ON BACK TO SITTING ON SIDE OF FLAT BED WITH BEDRAILS: A LOT
HELP NEEDED FOR BATHING: A LOT
DRESSING REGULAR UPPER BODY CLOTHING: A LOT
DRESSING REGULAR UPPER BODY CLOTHING: A LOT
DAILY ACTIVITIY SCORE: 13
HELP NEEDED FOR BATHING: A LOT
MOVING TO AND FROM BED TO CHAIR: TOTAL
TURNING FROM BACK TO SIDE WHILE IN FLAT BAD: TOTAL
CLIMB 3 TO 5 STEPS WITH RAILING: TOTAL
TOILETING: A LOT
PERSONAL GROOMING: A LITTLE
MOVING FROM LYING ON BACK TO SITTING ON SIDE OF FLAT BED WITH BEDRAILS: A LOT
EATING MEALS: A LITTLE
DAILY ACTIVITIY SCORE: 13
EATING MEALS: A LITTLE
CLIMB 3 TO 5 STEPS WITH RAILING: TOTAL
DAILY ACTIVITIY SCORE: 13
TURNING FROM BACK TO SIDE WHILE IN FLAT BAD: TOTAL
STANDING UP FROM CHAIR USING ARMS: TOTAL
TOILETING: A LOT
TURNING FROM BACK TO SIDE WHILE IN FLAT BAD: TOTAL
MOVING TO AND FROM BED TO CHAIR: TOTAL
PERSONAL GROOMING: A LITTLE
TOILETING: A LOT
DAILY ACTIVITIY SCORE: 13
TOILETING: A LOT
MOBILITY SCORE: 7
MOVING FROM LYING ON BACK TO SITTING ON SIDE OF FLAT BED WITH BEDRAILS: A LOT
CLIMB 3 TO 5 STEPS WITH RAILING: TOTAL
WALKING IN HOSPITAL ROOM: TOTAL
DRESSING REGULAR LOWER BODY CLOTHING: TOTAL
PERSONAL GROOMING: A LITTLE
MOVING TO AND FROM BED TO CHAIR: TOTAL
EATING MEALS: A LITTLE
CLIMB 3 TO 5 STEPS WITH RAILING: TOTAL
PERSONAL GROOMING: A LITTLE
MOBILITY SCORE: 7
PATIENT BASELINE BEDBOUND: NO
MOBILITY SCORE: 7
DRESSING REGULAR UPPER BODY CLOTHING: A LOT
DRESSING REGULAR LOWER BODY CLOTHING: TOTAL
DRESSING REGULAR UPPER BODY CLOTHING: A LOT
WALKING IN HOSPITAL ROOM: TOTAL
DRESSING REGULAR LOWER BODY CLOTHING: TOTAL
WALKING IN HOSPITAL ROOM: TOTAL
MOVING FROM LYING ON BACK TO SITTING ON SIDE OF FLAT BED WITH BEDRAILS: A LOT
WALKING IN HOSPITAL ROOM: TOTAL
MOVING TO AND FROM BED TO CHAIR: TOTAL
TURNING FROM BACK TO SIDE WHILE IN FLAT BAD: TOTAL
STANDING UP FROM CHAIR USING ARMS: TOTAL
DRESSING REGULAR LOWER BODY CLOTHING: TOTAL
EATING MEALS: A LITTLE
STANDING UP FROM CHAIR USING ARMS: TOTAL
MOBILITY SCORE: 7

## 2024-05-30 ASSESSMENT — PAIN - FUNCTIONAL ASSESSMENT
PAIN_FUNCTIONAL_ASSESSMENT: 0-10

## 2024-05-30 ASSESSMENT — LIFESTYLE VARIABLES
HOW OFTEN DO YOU HAVE 6 OR MORE DRINKS ON ONE OCCASION: LESS THAN MONTHLY
AUDIT-C TOTAL SCORE: 0
HOW MANY STANDARD DRINKS CONTAINING ALCOHOL DO YOU HAVE ON A TYPICAL DAY: 1 OR 2
AUDIT-C TOTAL SCORE: 2
HOW OFTEN DO YOU HAVE A DRINK CONTAINING ALCOHOL: MONTHLY OR LESS
SKIP TO QUESTIONS 9-10: 1
SUBSTANCE_ABUSE_PAST_12_MONTHS: NO
PRESCIPTION_ABUSE_PAST_12_MONTHS: NO
SKIP TO QUESTIONS 9-10: 0
AUDIT-C TOTAL SCORE: 2

## 2024-05-30 ASSESSMENT — ACTIVITIES OF DAILY LIVING (ADL)
JUDGMENT_ADEQUATE_SAFELY_COMPLETE_DAILY_ACTIVITIES: YES
DRESSING YOURSELF: INDEPENDENT
HOME_MANAGEMENT_TIME_ENTRY: 8
BATHING_ASSISTANCE: MAXIMAL
WALKS IN HOME: INDEPENDENT
ADEQUATE_TO_COMPLETE_ADL: YES
ASSISTIVE_DEVICE: NONE;WALKER
ADL_ASSISTANCE: INDEPENDENT
BATHING: INDEPENDENT
ADL_ASSISTANCE: INDEPENDENT
HEARING - LEFT EAR: FUNCTIONAL
TOILETING: INDEPENDENT
FEEDING YOURSELF: INDEPENDENT
HEARING - RIGHT EAR: FUNCTIONAL
PATIENT'S MEMORY ADEQUATE TO SAFELY COMPLETE DAILY ACTIVITIES?: YES
GROOMING: INDEPENDENT
LACK_OF_TRANSPORTATION: NO

## 2024-05-30 ASSESSMENT — ENCOUNTER SYMPTOMS
MUSCULOSKELETAL NEGATIVE: 1
CONSTITUTIONAL NEGATIVE: 1
ENDOCRINE NEGATIVE: 1
EYES NEGATIVE: 1
CARDIOVASCULAR NEGATIVE: 1
NEUROLOGICAL NEGATIVE: 1
PSYCHIATRIC NEGATIVE: 1
ABDOMINAL PAIN: 1
ALLERGIC/IMMUNOLOGIC NEGATIVE: 1
RESPIRATORY NEGATIVE: 1
HEMATOLOGIC/LYMPHATIC NEGATIVE: 1

## 2024-05-30 ASSESSMENT — PAIN SCALES - GENERAL
PAINLEVEL_OUTOF10: 6
PAINLEVEL_OUTOF10: 8
PAINLEVEL_OUTOF10: 10 - WORST POSSIBLE PAIN
PAINLEVEL_OUTOF10: 10 - WORST POSSIBLE PAIN
PAINLEVEL_OUTOF10: 4
PAINLEVEL_OUTOF10: 4

## 2024-05-30 ASSESSMENT — PAIN DESCRIPTION - DESCRIPTORS
DESCRIPTORS: ACHING;STABBING
DESCRIPTORS: THROBBING;STABBING
DESCRIPTORS: ACHING;STABBING

## 2024-05-30 ASSESSMENT — PATIENT HEALTH QUESTIONNAIRE - PHQ9
SUM OF ALL RESPONSES TO PHQ9 QUESTIONS 1 & 2: 0
2. FEELING DOWN, DEPRESSED OR HOPELESS: NOT AT ALL
1. LITTLE INTEREST OR PLEASURE IN DOING THINGS: NOT AT ALL

## 2024-05-30 NOTE — CONSULTS
"Assessment/Plan   Large hernia without clinical or imaging signs of obstruction or ischemia. 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 Discussed with Dr Stallings.  recommends Hernia specialist referral either with  or [facility] for OP follow up. No urgent surgical indications at this time. May have a diet. Daily dressings to abdominal wound. Discussed with Dr. Sun  Inpatient consult to Acute Care Surgery  Consult performed by: KIMBERLEY Wall-CNP  Consult ordered by: Rowan Schroeder MD        Subjective     Pt presents with vague complaints of abdominal pain. He currently has a large incisional hernia with skin ulceration. Past surgical history of Whipple procedure 4/22/2019 at [facility]. He lifted a heavy object and developed a hernia, for which he has had since 2022. He noticed skin ulceration over his incisional hernia commencing approximately 8-9 months ago. Some foul smelling drainage and  \" skin that doesn't want to heal\". Pain to right abdomen flank region 10/10 dull and achy after he sustained a fall doing work around his house and muscle strain after a fall, and which it took a bit of effort to get back on to his feet. He denies abdominal trauma . Denies fever or chills. No n/v. Has had +  bowel function. Has had loose stools since eating out over Memorial Day holiday.     He does not take blood thinners      Abdominal Pain      Past Medical History  Medical History        Past Medical History:   Diagnosis Date    Bipolar I disorder (Multi)      CVA (cerebral vascular accident) (Multi)      Duodenal cancer (Multi) 03/2019    Epilepsy (Multi)      Hyperlipidemia      Hypertension      Tobacco use      Urethral stricture              Surgical History  Surgical History[]Expand by Default         Past Surgical History:   Procedure Laterality Date    ADENOIDECTOMY        URETHRA SURGERY         Urethral stricture repair    WHIPPLE " PROCEDURE   04/22/2019     Diagnostic laparoscopy, open laparotomy for pylorus-preserving pancreaticoduodenectomy, pancreaticojejunostomy, hepaticojejunostomy, gastrojejunostomy and liver biopsy            Social History  He reports that he has been smoking cigarettes. He started smoking about 37 years ago. He does not have any smokeless tobacco history on file. He reports current alcohol use. No history on file for drug use.     Family History  Family History          Family History   Problem Relation Name Age of Onset    Stroke Mother        Bipolar disorder Mother        Prostate cancer Father        Hypertension Sister        Hyperlipidemia Sister        Asthma Son        Colon cancer Paternal Grandfather                     Review of Systems  Review of Systems   Constitutional: Negative.    HENT: Negative.     Eyes: Negative.    Respiratory: Negative.     Cardiovascular: Negative.    Gastrointestinal:  Positive for abdominal pain.   Endocrine: Negative.    Genitourinary: Negative.    Musculoskeletal: Negative.    Skin: Negative.    Allergic/Immunologic: Negative.    Neurological: Negative.    Hematological: Negative.    Psychiatric/Behavioral: Negative.         Objective     Vital signs for last 24 hours:  Temperature:  [37.1 °C (98.8 °F)] 37.1 °C (98.8 °F)  Heart Rate:  [] 67  Respirations:  [17-19] 18  BP: (121-152)/(68-83) 152/83    Intake/Output this shift:  No intake/output data recorded.    Physical Exam  Physical Exam  Constitutional:       Appearance: Normal appearance.   HENT:      Head: Normocephalic and atraumatic.      Right Ear: Tympanic membrane normal.      Nose: Nose normal.      Mouth/Throat:      Mouth: Mucous membranes are moist.   Eyes:      Pupils: Pupils are equal, round, and reactive to light.   Cardiovascular:      Rate and Rhythm: Normal rate and regular rhythm.      Pulses: Normal pulses.   Pulmonary:      Effort: Pulmonary effort is normal.      Breath sounds: Normal breath  sounds.   Abdominal:      General: Bowel sounds are normal. There is no distension.      Palpations: Abdomen is soft.      Tenderness: There is no abdominal tenderness. There is no guarding.      Hernia: No hernia is present.      Comments: Large incisional hernia with overlying ulceration of skin measuring 5 x 1.75 cm. No drainage currently.     Diffuse abdominal tenderness   Genitourinary:     Rectum: Normal.   Musculoskeletal:         General: Normal range of motion.   Skin:     General: Skin is warm and dry.   Neurological:      General: No focal deficit present.      Mental Status: He is alert.   Psychiatric:         Mood and Affect: Mood normal.         Behavior: Behavior normal.         Labs  CBC:   Lab Results   Component Value Date    WBC 5.7 05/30/2024    RBC 3.63 (L) 05/30/2024     BMP:   Lab Results   Component Value Date    GLUCOSE 91 05/30/2024    CO2 21 (L) 05/30/2024    BUN 5 (L) 05/30/2024    CREATININE 0.40 05/30/2024    CALCIUM 7.5 (L) 05/30/2024

## 2024-05-30 NOTE — PROGRESS NOTES
Nils Hogan is a 54 y.o. male on day 0 of admission presenting with Abdominal pain.      Subjective   Patient complain of abdominal pain.  Patient denied fever or chills.  Patient stated he got morphine but is not helping much.  He denied nausea or vomiting.         Objective     Last Recorded Vitals  /83   Pulse 67   Temp 37.1 °C (98.8 °F)   Resp 18   Wt 73.5 kg (162 lb)   SpO2 96%   Intake/Output last 3 Shifts:  No intake or output data in the 24 hours ending 05/30/24 1103    Admission Weight  Weight: 73.5 kg (162 lb) (05/29/24 1654)    Daily Weight  05/29/24 : 73.5 kg (162 lb)    Image Results  CT abdomen pelvis wo IV contrast  Narrative: Interpreted By:  Diane Hernandez,   STUDY:  CT ABDOMEN PELVIS WO IV CONTRAST;  5/29/2024 5:47 pm      INDICATION:  Signs/Symptoms:abdominal pain.      COMPARISON:  03/26/2019      ACCESSION NUMBER(S):  [id number]      ORDERING CLINICIAN:  OSMIN KELLOGG      TECHNIQUE:  CT of the abdomen and pelvis was performed without IV contrast.  Sagittal and coronal reconstructions.      FINDINGS:  Limited evaluation for solid organs and vasculature without  intravenous contrast.      Lower Chest: Streaky bibasilar, lingular, and right middle lobe  atelectasis. Coronary artery calcifications.      Liver: There are several small hypodensities throughout the liver,  too small to characterize. Enlargement of the left hepatic lobe.  Questionable surface nodularity in the liver. Calcified granulomas in  the liver.      Gallbladder and Biliary: Status post Whipple procedure.      Pancreas: Atrophic pancreas. Status post Whipple procedure.      Spleen: No abnormality identified in the spleen.      Adrenals: No abnormality identified in either adrenal gland.      Urinary: No parenchymal abnormality identified in either kidney. No  hydronephrosis. Diffuse bladder wall thickening. Bilateral Hutch  diverticula are again noted.      Gastrointestinal/Peritoneum: No small or large bowel  obstruction in  the visualized abdomen. In the abdomen, there is no extraluminal air.  Small volume diffuse ascites. Diffuse mesenteric edema. Diffuse  colonic diverticulosis. The appendix is not visualized.      Vascular: Abdominal aorta is normal in caliber. Atherosclerosis.      Lymphatics: No enlarged lymph nodes by size criteria.      MSK/Body Wall: No aggressive bony lesion identified. Multilevel  degenerative change in the spine. Small fat containing bilateral  inguinal hernias. Large upper abdominal ventral hernia containing fat  and bowel loops, the neck measuring 8.4 cm, the sac measuring 17 cm.  Small fat containing umbilical hernia. Additional small hernia just  to the right of the umbilicus with the neck measuring 1.9 cm and sac  measuring 2.8 cm with internal stranding and fluid.      Impression: Large ventral hernia containing fat and bowel.      Small umbilical hernia and adjacent right paraumbilical hernia.      Diffuse mesenteric edema with small volume ascites.      Diffuse colonic diverticulosis.      Status post Whipple procedure.      Questionable cirrhotic liver morphology.      Diffuse bladder wall thickening is again noted. Bilateral Hutch  diverticula are again noted.      Signed by: Diane Hernandez 5/29/2024 6:38 PM  Dictation workstation:   [id number]    Physical Exam    General: Moderate distress, cooperating during physical exam.  HEENT: Pupils are equal and reactive to light and commendation , oral mucosa moist, no JVD .  Cardiovascular: Normal sinus rhythm, no MRG.  Lungs: Clear to auscultation bilaterally, no wheezing, no crackles, no dullness to percussion.  Abdomen: Abdominal hernia, positive bowel sounds ,no hepatosplenomegaly appreciated, slight erythema, no purulent discharge , atrophic scar from previous surgery  Neuro: Alert and oriented x3, strength in upper and lower extremities , sensation intact.  Musculoskeletal: No swelling in lower extremities, no limitation in range  of motion.  Vascular: Pulses are intact in upper and lower extremities  Skin: No petechiae, ecchymosis or other stigmata for dermatology disease.     Assessment/Plan      Abdominal pain.  CT abdomen revealed large ventral hernia,Small umbilical hernia and right  Umbilical hernia diffuse mesenteric edema  Waiting for surgery to see him today.  Continue with pain medication.  Cover empirically with IV antibiotics  Patient is on meropenem.    Ventral hernia  Not obstruction    Hyperbilirubinemia  Monitor close    Hyponatremia   Resolved    History of duodenal cancer  Status post Whipple procedure done in 2019 at [facility].    Tobacco use  Advised to quit smoking    Treat symptomatically  Waiting for surgery to see him today  Continue with pain medication as needed    Check CBC and CMP in AM.  Time spent with patient 35 minutes  Principal Problem:    Abdominal pain  Active Problems:    Lactic acidosis    Diarrhea    Ventral hernia without obstruction or gangrene                  Anthony Sun MD

## 2024-05-30 NOTE — PROGRESS NOTES
Physical Therapy    Physical Therapy Evaluation    Patient Name: Nils Hogan  MRN: [id number]  Today's Date: 5/30/2024   Time Calculation  Start Time: 0831  Stop Time: 0851  Time Calculation (min): 20 min    Assessment/Plan   PT Assessment  PT Assessment Results: Decreased strength, Decreased range of motion, Decreased endurance, Impaired balance, Decreased mobility, Decreased coordination, Decreased cognition, Impaired judgement, Decreased safety awareness, Impaired tone, Decreased skin integrity, Pain  Rehab Prognosis: Good  Barriers to Discharge: medical status  Evaluation/Treatment Tolerance: Patient limited by fatigue, Patient limited by pain  Medical Staff Made Aware: Yes  Strengths: Housing layout  Barriers to Participation: Comorbidities, Coping skills, Insight into problems, Support of Caregivers  End of Session Communication: Bedside nurse  Assessment Comment: Pt demonstrates impaired functional mobility from baseline level; pt with pain, impaired BLE strength/ROM, impaired balance, decreased overall activity tolerance, and impaired safety awareness; pt is a high falls risk at this time and is not safe to return home alone.  End of Session Patient Position: On cart, Bed, 2 rail up (RN in room)  IP OR SWING BED PT PLAN  Inpatient or Swing Bed: Inpatient  PT Plan  Treatment/Interventions: Bed mobility, Transfer training, Gait training, Balance training, Neuromuscular re-education, Strengthening, Endurance training, Range of motion, Therapeutic exercise, Therapeutic activity  PT Plan: Skilled PT  PT Frequency: 4 times per week  PT Discharge Recommendations: Moderate intensity level of continued care  Equipment Recommended upon Discharge: Wheeled walker  PT Recommended Transfer Status: Assist x2  PT - OK to Discharge: Yes    Subjective   General Visit Information:  General  Reason for Referral: impaired functional mobility; pt is a 54 year-old M admitted from home with c/o abdominal pain, fall, and drainage  from a wound; CT abdomen (+) ventral hernia and mesenteric edema; pt awaiting sx consult.  Referred By: Dr. 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  Family/Caregiver Present: No  Co-Treatment: OT  Co-Treatment Reason: initial evaluation of pt in ED; co-evaluation of pt to maximize pt outcomes and need for two-person skilled assist for safe pt handling.  Prior to Session Communication: Bedside nurse  Patient Position Received: Bed, 2 rail up, On cart  Preferred Learning Style: verbal  General Comment: Pt cleared for therapy via RN, received in supine, anxious but agreeable to participate in therapy.  Home Living:  Home Living  Type of Home: Mobile home  Lives With: Alone  Home Adaptive Equipment: Walker rolling or standard (rollator)  Home Layout: One level  Home Access: Ramped entrance  Bathroom Shower/Tub: Tub/shower unit  Bathroom Toilet: Standard  Bathroom Equipment: Grab bars in shower, Shower chair with back  Prior Level of Function:  Prior Function Per Pt/Caregiver Report  Level of [place]: Independent with ADLs and functional transfers, Needs assistance with homemaking  Receives Help From: Friends (friends assist with transportation)  ADL Assistance: Independent  Homemaking Assistance: Needs assistance  Driving/Transportation: Family/Friend  Homemaking Assistance Comments: meal assistance program for all meals/groceries; pt reports independence with cleaning/laundry  Ambulatory Assistance: Independent (mod indep with rollator)  Hand Dominance: Left  Prior Function Comments: h/o ~5 recent falls  Precautions:  Precautions  Hearing/Visual Limitations: no glasses, reports mild visual deficits  Medical Precautions: Fall precautions  Vital Signs:  Vital Signs  Pulse Ox: 96 %    Objective   Pain:  Pain Assessment  Pain Assessment: 0-10  Pain Score: 10 - Worst possible pain  Pain Type:  Acute pain  Pain Location: Abdomen  Pain Interventions: Repositioned, Other (Comment) (pt reports just received pain meds)  Cognition:  Cognition  Overall Cognitive Status: Impaired  Following Commands: Follows one step commands with repetition  Safety Judgment: Decreased awareness of need for assistance  Cognition Comments: A&O x 2-3; limited insight; anxious throughout session; dysarthric from previous CVA  Insight: Moderate    General Assessments:  General Observation  General Observation: anxious but cooperative; disheveled appearance, incontinent of urine (RN notified)     Activity Tolerance  Endurance: Decreased tolerance for upright activites  Activity Tolerance Comments: c/o dizziness with lying flat; anxious in sitting on EOB and unable to maintain > 10 seconds    Sensation  Light Touch: Partial deficits in the RLE, Partial deficits in the LLE  Sensation Comment: pt reports chronic paresthesias BLE    Strength  Strength Comments: BLE > 3-/5 (difficult to formally assess d/t pt anxiety/poor activity tolerance)  Strength  Strength Comments: BLE > 3-/5 (difficult to formally assess d/t pt anxiety/poor activity tolerance)    Coordination  Movements are Fluid and Coordinated: No  Coordination Comment: coordination grossly decreased throughout, with BUE/LE tremoring with movement    Postural Control  Postural Control: Impaired  Posture Comment: forward head, rounded shoulders    Static Sitting Balance  Static Sitting-Balance Support: Bilateral upper extremity supported  Static Sitting-Level of Assistance: Maximum assistance  Static Sitting-Comment/Number of Minutes: max assist x 1-2 to maintain static seated balance on EOB; pt able to maintain static long sitting in bed with supervision for safety     Functional Assessments:     Bed Mobility  Bed Mobility: Yes  Bed Mobility 1  Bed Mobility 1: Supine to sitting  Level of Assistance 1: Moderate assistance, +2  Bed Mobility Comments 1: assist for trunk elevation and  advancement of BLE off EOB  Bed Mobility 2  Bed Mobility  2: Sitting to supine  Level of Assistance 2: Moderate assistance, +2  Bed Mobility Comments 2: assist for controlled trunk descent and lifting BLE onto bed  Bed Mobility 3  Bed Mobility 3: Scooting  Level of Assistance 3: Close supervision  Bed Mobility Comments 3: pt able to scoot towards HOB with cueing for sequencing and use of BUE on bedrails    Transfers  Transfer: No (unable to safely attempt sit <> stand transfer at this time d/t poor static seated balance on EOB and pt anxiety)    Ambulation/Gait Training  Ambulation/Gait Training Performed: No    Stairs  Stairs: No    Extremity/Trunk Assessments:  RLE   RLE :  (AROM limited at end ranges)  LLE   LLE :  (AROM limited at end ranges; per chart, pt with h/o L-sided deficits from previous CVA; difficult to formally assess d/t pt anxiety/poor activity tolerance)  Outcome Measures:  [facility] Basic Mobility  Turning from your back to your side while in a flat bed without using bedrails: A lot  Moving from lying on your back to sitting on the side of a flat bed without using bedrails: Total  Moving to and from bed to chair (including a wheelchair): Total  Standing up from a chair using your arms (e.g. wheelchair or bedside chair): Total  To walk in hospital room: Total  Climbing 3-5 steps with railing: Total  Basic Mobility - Total Score: 7    Encounter Problems       Encounter Problems (Active)       Balance       STG - Maintains static standing balance with upper extremity support and supervision for safety. (Not Progressing)       Start:  05/30/24    Expected End:  06/28/24       INTERVENTIONS:  1. Practice standing with minimal support.  2. Educate patient about standing tolerance.  3. Educate patient about independence with gait, transfers, and ADL's.  4. Educate patient about use of assistive device.  5. Educate patient about self-directed care.         STG - Maintains static sitting balance with upper  extremity support and modified independence. (Progressing)       Start:  05/30/24    Expected End:  06/28/24       INTERVENTIONS:  1. Practice sitting on the edge of a bed/mat with minimal support.  2. Educate patient about maintining total hip precautions while maintaining balance.  3. Educate patient about pressure relief.  4. Educate patient about use of assistive device.            Mobility       STG - Patient will ambulate 50' with use of rolling walker and supervision for safety. (Not Progressing)       Start:  05/30/24    Expected End:  06/28/24               PT Transfers       STG - Patient to transfer to and from sit to supine with modified independence. (Progressing)       Start:  05/30/24    Expected End:  06/28/24            STG - Patient will transfer sit to and from stand with use of rolling walker and supervision for safety. (Not Progressing)       Start:  05/30/24    Expected End:  06/28/24               Pain          Safety       STG - Patient uses call light consistently to request assistance with transfers (Progressing)       Start:  05/30/24    Expected End:  06/28/24                   Education Documentation  Mobility Training, taught by Yari Aguilera, PT at 5/30/2024  9:31 AM.  Learner: Patient  Readiness: Acceptance  Method: Explanation, Demonstration  Response: Needs Reinforcement    Education Comments  No comments found.

## 2024-05-30 NOTE — PROGRESS NOTES
05/30/24 0834   Discharge Planning   Living Arrangements Alone   Support Systems Friends/neighbors   Type of Residence Private residence   Number of Stairs to Enter Residence 0  (pt has a ramp)   Number of Stairs Within Residence 0   Do you have animals or pets at home? No   Who is requesting discharge planning? Provider   Home or Post Acute Services None   Patient expects to be discharged to: Home   Does the patient need discharge transport arranged? Yes   RoundTrip coordination needed? Yes   Has discharge transport been arranged? No   Financial Resource Strain   How hard is it for you to pay for the very basics like food, housing, medical care, and heating? Not hard   Housing Stability   In the last 12 months, was there a time when you were not able to pay the mortgage or rent on time? N   In the last 12 months, how many places have you lived? 1   In the last 12 months, was there a time when you did not have a steady place to sleep or slept in a shelter (including now)? N   Transportation Needs   In the past 12 months, has lack of transportation kept you from medical appointments or from getting medications? yes  (pt re-schedules if he feels it is important)   In the past 12 months, has lack of transportation kept you from meetings, work, or from getting things needed for daily living? No   Patient Choice   Patient / Family choosing to utilize agency / facility established prior to hospitalization No

## 2024-05-30 NOTE — CARE PLAN
"Pt does not have a POA or Living Will    ADOD: 2 days    Pt lives alone in a mobile home; it has a ramp; he uses a rollator; he had a CVA in 2018; he has some slurred speech, weakness. He also has a past hx of duodenal CA and had abdominal surgery in 2019 (Whipple procedure)  He does not drive; his friends drive him to his appointments at times. He has used his insurance co for transportation but they take hrs to pick him up after the appointment.  Number for [place] and [place] given to pt  He has  a lift chair at home that he sleeps in, no home 02/cpap/bipap. He has a nebulizer that he used after he developed pneumonia in 2019.  He has a hx of anxiety and depression, he is being tx for it; denies S. I  He does not wear glasses but he said that he needs them; no hearing aids.  He showers independently, he uses a shower chair and he has grab bars \"everywhere\"  His PCP is Dr. Bryant from the [facility]; he uses [facility] in [place] for his meds and his insurance covers the expense.  His neighbors call and visit him daily; they cut his grass and assist at home.  Pt is admitted with abd pain- hernia    DISCHARGE PLAN: AT THIS TIME THE PLAN IS HOME; UNKNOWN IF HE WILL NEED HHC AT THIS TIME  "

## 2024-05-30 NOTE — PROGRESS NOTES
05/30/24 0836   [facility] Disability Status   Are you deaf or do you have serious difficulty hearing? N   Are you blind or do you have serious difficulty seeing, even when wearing glasses? N   Because of a physical, mental, or emotional condition, do you have serious difficulty concentrating, remembering, or making decisions? (5 years old or older) N   Do you have serious difficulty walking or climbing stairs? Y   Do you have serious difficulty dressing or bathing? Y  (pt uses a shower chair and grab bars)   Because of a physical, mental, or emotional condition, do you have serious difficulty doing errands alone such as visiting the doctor? Y  (Hx of CVA  in 2018; uses a rollator)

## 2024-05-30 NOTE — CARE PLAN
The patient's goals for the shift include Control pain    The clinical goals for the shift include Increase mobility      Problem: Pain  Goal: STG - Patients pain is managed to allow active participation in daily activities  Outcome: Progressing  Goal: My pain/discomfort is manageable  Outcome: Progressing     Problem: Safety  Goal: Patient will be injury free during hospitalization  Outcome: Progressing  Goal: I will remain free of falls  Outcome: Progressing     Problem: Daily Care  Goal: Daily care needs are met  Outcome: Progressing  Flowsheets (Taken 5/30/2024 1717)  Daily care needs are met:   Assess and monitor ability to perform self care and identify potential discharge needs   Assist patient with activities of daily living as needed   Assess skin integrity/risk for skin breakdown   Encourage independent activity per ability   Include patient/family/caregiver in decisions related to daily care     Problem: Psychosocial Needs  Goal: Demonstrates ability to cope with hospitalization/illness  Outcome: Progressing  Flowsheets (Taken 5/30/2024 1717)  Demonstrates ability to cope with hospitalization/illness: Encourage verbalization of feelings/concerns/expectations  Goal: Collaborate with me, my family, and caregiver to identify my specific goals  Outcome: Progressing     Problem: Discharge Barriers  Goal: My discharge needs are met  Outcome: Progressing  Flowsheets (Taken 5/30/2024 1717)  Resident's discharge needs are met:   Identify potential discharge barriers on admission and throughout stay   Involve resident/family/caregiver in discharge planning process     Problem: Skin  Goal: Decreased wound size/increased tissue granulation at next dressing change  Outcome: Progressing  Flowsheets (Taken 5/30/2024 1717)  Decreased wound size/increased tissue granulation at next dressing change:   Promote sleep for wound healing   Protective dressings over bony prominences   Utilize specialty bed per algorithm  Goal:  Participates in plan/prevention/treatment measures  Outcome: Progressing  Flowsheets (Taken 5/30/2024 1717)  Participates in plan/prevention/treatment measures:   Discuss with provider PT/OT consult   Elevate heels   Increase activity/out of bed for meals  Goal: Prevent/manage excess moisture  Outcome: Progressing  Flowsheets (Taken 5/30/2024 1717)  Prevent/manage excess moisture:   Monitor for/manage infection if present   Cleanse incontinence/protect with barrier cream   Moisturize dry skin   Follow provider orders for dressing changes  Goal: Prevent/minimize sheer/friction injuries  Outcome: Progressing  Flowsheets (Taken 5/30/2024 1717)  Prevent/minimize sheer/friction injuries: Use pull sheet  Goal: Promote/optimize nutrition  Outcome: Progressing  Flowsheets (Taken 5/30/2024 1717)  Promote/optimize nutrition:   Consume > 50% meals/supplements   Discuss with provider if NPO > 2 days   Monitor/record intake including meals  Goal: Promote skin healing  Outcome: Progressing  Flowsheets (Taken 5/30/2024 1717)  Promote skin healing:   Turn/reposition every 2 hours/use positioning/transfer devices   Assess skin/pad under line(s)/device(s)     Problem: Fall/Injury  Goal: Not fall by end of shift  Outcome: Progressing  Goal: Be free from injury by end of the shift  Outcome: Progressing  Goal: Verbalize understanding of personal risk factors for fall in the hospital  Outcome: Progressing  Goal: Verbalize understanding of risk factor reduction measures to prevent injury from fall in the home  Outcome: Progressing  Goal: Use assistive devices by end of the shift  Outcome: Progressing  Goal: Pace activities to prevent fatigue by end of the shift  Outcome: Progressing

## 2024-05-30 NOTE — PROGRESS NOTES
IN BRIEF    History: 54-year-old male with a significant history of Whipple procedure 9 months ago for cancer presents with complaint of abdominal pain.  He does have an open wound to his mid line incision that he states is chronic since his surgery.  He has been having abdominal pain for the past several days which she admits to medicating with alcohol to help with his symptoms.  He feels that his symptoms started after eating meatballs at a party.    Exam: Abdomen is distended and diffusely tender.  When not palpated the patient is lying comfortably in bed.  There is a well-healed midline incisional scar with central chronic wound with surrounding granulation without drainage or weeping, there is an underlying abdominal hernia that is tender to palpation       ED COURSE   MDM: Abdominal workup initiated and general surgery is notified given the patient's exquisite tenderness.  Do not feel that the chronic wound is infected at this time.  Lab workup returned showing an elevated lactate, evidence of urinary tract infection, and an elevated bilirubin compared to prior.  He does have a transaminitis that is comparable.  There is no significant leukocytosis.  CT scan shows a large abdominal hernia without evidence of incarceration or strangulation.  There are other nonspecific findings as well.  No indication for acute surgical intervention at this time.  Patient will be admitted to the floor for further care.  He is started on antibiotics.    I personally saw the patient and made/approved the management plan and take responsibility for the patient management.  Parts of this chart were completed with dictation software, please excuse any errors in transcription.     Latha Love, DO  10:30 PM

## 2024-05-30 NOTE — PROGRESS NOTES
Pharmacy Medication History Review    Nils Hogan is a 54 y.o. male admitted for Abdominal pain. Pharmacy reviewed the patient's [id number] medications and allergies for accuracy.    Medications ADDED:  Zinc 50mg  D3 2000 Unit  Aspirin 81 mg  Medications CHANGED:  none  Medications REMOVED:   Quetiapine 100mg - patient not taking  Excitalopram 10mg - patient not taking     The list below reflects the updated PTA list. Comments regarding how patient may be taking medications differently can be found in the Admit Orders Activity  Prior to Admission Medications   Prescriptions Last Dose Informant   ALPRAZolam (Xanax) 1 mg tablet 5/29/2024 Self   Sig: Take 1 tablet (1 mg) by mouth 2 times a day as needed for anxiety.   ascorbic acid (Vitamin C) 1,000 mg tablet  Self   Sig: Take 1 tablet (1,000 mg) by mouth once daily.   aspirin 81 mg EC tablet  Self   Sig: Take 1 tablet (81 mg) by mouth once daily as needed for mild pain (1 - 3).   cholecalciferol (D3-2000) 50 mcg (2,000 unit) capsule  Self   Sig: Take 1 capsule (50 mcg) by mouth once daily.   metoprolol tartrate (Lopressor) 25 mg tablet 5/29/2024 Self   Sig: Take 1 tablet (25 mg) by mouth 2 times a day.   pantoprazole (ProtoNix) 40 mg EC tablet  Self   Sig: Take 1 tablet (40 mg) by mouth once daily in the morning. Take before meals.   zinc gluconate 50 mg tablet  Self   Sig: Take 1 tablet (50 mg of elemental zinc) by mouth once daily.      Facility-Administered Medications: None        The list below reflects the updated allergy list. Please review each documented allergy for additional clarification and justification.  Allergies  Reviewed by Thelma Pettit RN on 5/29/2024        Severity Reactions Comments    Diazepam High Anaphylaxis, Cardiac arrhythmia/arrest     Iodinated Contrast Media High Anaphylaxis     Iodine High Anaphylaxis     Acetaminophen Low Nausea/vomiting     Phenytoin Sodium Extended Low Hives, Rash             Pharmacy has been updated  "to  [facility].    Sources used to complete the med history include historical AVS, patient interview. Patient is a fair historian.    Below are additional concerns with the patient's PTA list.  Patient is currently prescribed quetiapine 100mg and escitalopram 10mg. Patient does not want to take either. He reports stopping escitalopram 2 to 3 weeks ago. Patient reports stopping quetiapine because it makes him feel \"dopey\".    Barbara Srivastava  Please reach out via [facility] Secure Chat for questions    "

## 2024-05-30 NOTE — PROGRESS NOTES
05/30/24 0829   Physical Activity   On average, how many days per week do you engage in moderate to strenuous exercise (like a brisk walk)? 0 days   On average, how many minutes do you engage in exercise at this level? 0 min   Financial Resource Strain   How hard is it for you to pay for the very basics like food, housing, medical care, and heating? Hard  (Pt said that electric bill is very high and he has missed a payment; pt has used [employer] in the past; suggested that he call for assistance.)   Housing Stability   In the last 12 months, was there a time when you were not able to pay the mortgage or rent on time? N   In the last 12 months, how many places have you lived? 1   In the last 12 months, was there a time when you did not have a steady place to sleep or slept in a shelter (including now)? N   Transportation Needs   In the past 12 months, has lack of transportation kept you from medical appointments or from getting medications? no  (Pt said that he sometimes re-schedules but it depends what the appointment is for-he relys on friends; he has used his insurance co for transportation but they take hours to pick him  up from the appointment)   In the past 12 months, has lack of transportation kept you from meetings, work, or from getting things needed for daily living? No   Food Insecurity   Within the past 12 months, you worried that your food would run out before you got the money to buy more. Never true  (Pt gets 14 frozen meals every 2 weeks from his insurance co.)   Within the past 12 months, the food you bought just didn't last and you didn't have money to get more. Never true   Stress   Do you feel stress - tense, restless, nervous, or anxious, or unable to sleep at night because your mind is troubled all the time - these days? Very much   Social Connections   In a typical week, how many times do you talk on the phone with family, friends, or neighbors? More than 3   How often do you get together  with friends or relatives? More than 3   How often do you attend Oriental orthodox or Druze services? Never  (Pt said that he watches Oriental orthodox on TV every Sunday AM)   Do you belong to any clubs or organizations such as Oriental orthodox groups, unions, fraternal or athletic groups, or school groups? No   How often do you attend meetings of the clubs or organizations you belong to? Never   Are you , , , , never , or living with a partner?    Intimate Partner Violence   Within the last year, have you been afraid of your partner or ex-partner? No   Within the last year, have you been humiliated or emotionally abused in other ways by your partner or ex-partner? No   Within the last year, have you been kicked, hit, slapped, or otherwise physically hurt by your partner or ex-partner? No   Within the last year, have you been raped or forced to have any kind of sexual activity by your partner or ex-partner? No   Alcohol Use   Q1: How often do you have a drink containing alcohol? Never   Q2: How many drinks containing alcohol do you have on a typical day when you are drinking? None   Q3: How often do you have six or more drinks on one occasion? Never   Utilities   In the past 12 months has the electric, gas, oil, or water company threatened to shut off services in your home? Yes  (pt said his electric bill is expensive; he will call [employer] for assistance)

## 2024-05-30 NOTE — PROGRESS NOTES
05/30/24 0837   Current Planned Discharge Disposition   Current Planned Discharge Disposition Home

## 2024-05-30 NOTE — PROGRESS NOTES
Occupational Therapy    Evaluation/Treatment    Patient Name: Nils Hogan  MRN: [id number]  : 1970  Today's Date: 24  Time Calculation  Start Time: 830  Stop Time: 855  Time Calculation (min): 25 min       Assessment:  OT Assessment: 54 year old male who comesw to the ED with severe right-sided abdominal pain which progressively got worse to the point that he decided to seek medical attention today.  He had no nausea or vomiting.  He has diarrhea which began yesterday, he attributes it to some outside food that he had eaten the day before ([employer]).  Patient is functioning below his baseline, limited this date with decreased strength, decreased balance, decreased endurance, poor safety, is a high fall risk, will benefit from continued OT intervention and moderate intensity rehab is recommended  Prognosis: Good  Barriers to Discharge: Decreased caregiver support  Evaluation/Treatment Tolerance: Patient limited by pain  Medical Staff Made Aware: Yes  End of Session Communication: Bedside nurse  End of Session Patient Position: On cart, Bed, 2 rail up  OT Assessment Results: Decreased ADL status, Decreased upper extremity strength, Decreased upper extremity range of motion, Decreased safe judgment during ADL, Decreased cognition, Decreased endurance, Visual deficit, Decreased fine motor control, Decreased functional mobility, Decreased gross motor control, Decreased IADLs  Prognosis: Good  Barriers to Discharge: Decreased caregiver support  Evaluation/Treatment Tolerance: Patient limited by pain  Medical Staff Made Aware: Yes  Strengths: Premorbid level of function  Barriers to Participation: Comorbidities  Plan:  Treatment Interventions: ADL retraining, Visual perceptual retraining, Functional transfer training, UE strengthening/ROM, Endurance training, Cognitive reorientation, Patient/family training, Equipment evaluation/education, Fine motor coordination activities, Compensatory technique  education  OT Frequency: 4 times per week  OT Discharge Recommendations: Moderate intensity level of continued care  Equipment Recommended upon Discharge: Wheeled walker  OT Recommended Transfer Status: Assist of 2  OT - OK to Discharge: Yes  Treatment Interventions: ADL retraining, Visual perceptual retraining, Functional transfer training, UE strengthening/ROM, Endurance training, Cognitive reorientation, Patient/family training, Equipment evaluation/education, Fine motor coordination activities, Compensatory technique education    Subjective   Current Problem:  1. Abdominal pain, unspecified abdominal location        2. Ventral hernia without obstruction or gangrene        3. Diarrhea, unspecified type        4. Lactic acidosis          General:   OT Received On: 05/30/24  General  Reason for Referral: Decreased ADL function due to abdominal pain.  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  Family/Caregiver Present: No  Co-Treatment: OT (Simultaneous filing. User may not have seen previous data.)  Co-Treatment Reason: initial evaluation of pt in ED; co-evaluation of pt to maximize pt outcomes and need for two-person skilled assist for safe pt handling.  Prior to Session Communication: Bedside nurse  Patient Position Received: On cart, Bed, 2 rail up  Preferred Learning Style: verbal, auditory  General Comment: Cleared by nurse to see patient for OT evaluation. Patient incontinent this date of urine when met in the ED on cart this date.  Precautions:  Hearing/Visual Limitations: per patient decreased vision, no glasses, needs to see MD. (Slurred speech post CVA in 2018)  Medical Precautions: Fall precautions, Other (comment) (open wound on abdomen at the site of hernia.)  Precautions Comment: Patient is a high fall risk, recommend bed/chair alarm  for safety.  Vital Signs:     Pain:  Pain Assessment  Pain Assessment: 0-10  Pain Score: 10 - Worst possible pain  Pain Type: Acute pain  Pain Location: Abdomen  Pain Interventions: Repositioned, Emotional support (per patient he did get his medicaiton earlier.)    Objective   Cognition:  Overall Cognitive Status: Impaired (for safety, insight/judgement, memory, very anxious this date.)  Attention: Within Functional Limits  Memory: Exceptions to WFL  Long-Term Memory: Impaired  Problem Solving: Exceptions to WFL  Complex Functional Tasks: Impaired  Safety/Judgement: Exceptions to WFL  Complex Functional Tasks: Moderate  Insight: Moderate  Impulsive: Moderately  Processing Speed: Delayed    Home Living:  Type of Home: Mobile home  Lives With: Alone  Home Adaptive Equipment: Cane, Other (Comment) (rollator, lift chair)  Home Layout: One level  Home Access: Ramped entrance  Bathroom Shower/Tub: Tub/shower unit  Bathroom Toilet: Standard  Bathroom Equipment: Grab bars in shower, Shower chair with back  Home Living Comments: Patient reports living with his service dog, sleep in recliner.  Prior Function:  Level of [place]: Independent with ADLs and functional transfers, Independent with homemaking with ambulation  Receives Help From: Other (Comment), Friends (gets meals from the [place] [employer].)  ADL Assistance: Independent  Homemaking Assistance: Independent  Ambulatory Assistance: Independent (with rollator, 5 recent falls.)  Vocational: Retired  Hand Dominance: Left  Prior Function Comments: Patient reports 5 recent falls, does not drive, is very paranoid to get help from his children, C, post COVID..  IADL History:  Homemaking Responsibilities: Yes  Meal Prep Responsibility: Primary  Laundry Responsibility: Primary  Cleaning Responsibility: Primary  Bill Paying/Finance Responsibility: Primary  Current License: No  Mode of Transportation: Family, Friends  ADL:  Eating Assistance: Independent  Eating  Deficit: Setup  Grooming Assistance: Independent  Grooming Deficit: Setup, Verbal cueing  Bathing Assistance: Maximal  UE Dressing Assistance: Maximal  UE Dressing Deficit: Pull over head (sweatshirt this date)  LE Dressing Assistance: Total  LE Dressing Deficit: Don/doff R sock, Don/doff L sock  Toileting Assistance with Device: Total  Toileting Deficit: Incontinent (maximal assist for hygiene this date and to change depends.)  Functional Assistance:  (assist x2)    Activity Tolerance:  Endurance: Decreased tolerance for upright activites  Activity Tolerance Comments: Patient limited with dizziness, pain.  Functional Standing Tolerance:     Bed Mobility/Transfers: Bed Mobility  Bed Mobility: Yes (Patient needed moderate assist x2 to get to the edge of the cart this date, very shaky, body tremors this date, poor balance this date, reports feeling very dizzine, needing to return back to bed with maximal assist x2 this date.)    Functional Mobility:  Functional Mobility  Functional Mobility Performed: No  Sitting Balance:  Static Sitting Balance  Static Sitting-Comment/Number of Minutes: poor, 10 sec, limited with dizziness.  Dynamic Sitting Balance  Dynamic Sitting-Comments: Poor    Vision:Vision - Basic Assessment  Current Vision: Does not wear glasses (reports decreased vision, unable to see the TV.)  Sensation:  Sensation Comment: Patient reports decreased sensation in the LUE and BLE.    Coordination:  Movements are Fluid and Coordinated: No  Upper Body Coordination: Decreased coordination LUE, post CVA.   Hand Function:  Hand Function  Gross Grasp: Impaired  Coordination: Impaired (Left hand)      Outcome Measures: [facility] Daily Activity  Putting on and taking off regular lower body clothing: Total  Bathing (including washing, rinsing, drying): A lot  Putting on and taking off regular upper body clothing: A lot  Toileting, which includes using toilet, bedpan or urinal: A lot  Taking care of personal grooming such  as brushing teeth: A little  Eating Meals: A little  Daily Activity - Total Score: 13        Education Documentation  ADL Training, taught by Lilly Mo OT at 5/30/2024 10:10 AM.  Learner: Patient  Readiness: Eager  Method: Demonstration, Explanation  Response: Verbalizes Understanding, Demonstrated Understanding, Needs Reinforcement  Comment: Patient was instructed in safe functional transfers/mobility .    Education Comments  No comments found.        OP EDUCATION:       Goals:  Encounter Problems       Encounter Problems (Active)       OT Goals       Patient will be able to complete UB/LB dressing, bathing, toileting with Close Supervision, use of AE as needed. (Progressing)       Start:  05/30/24    Expected End:  06/13/24            Patient will be able to complete functional transfers with least restrictive device with Close Supervision with G balance using good safety. (Progressing)       Start:  05/30/24    Expected End:  06/13/24            Patient will be able to complete functional mobility with the least restrictive device with Close Supervision using good safety and with G balance. (Progressing)       Start:  05/30/24    Expected End:  06/13/24            Patient will be able to tolerate 10 min of functional sitting and standing tasks with G balance in prep for ADL/transfers. (Progressing)       Start:  05/30/24    Expected End:  06/13/24

## 2024-05-30 NOTE — H&P
History Of Present Illness  Nils Hogan is a 54 y.o. male presenting with abdominal pain which began yesterday.   He receives his care primarily through the [facility].  He was previously diagnosed with duodenal cancer in 2019 and underwent Whipple's procedure on 4/22/2019 at the [facility].  He states that sometime in 2022, he lifted a large container of water onto a water cooler in his kitchen and the following day, he noticed a bulge in the anterior abdomen which was a hernia.  It progressively got larger over the next several months.  About 9 or 10 months ago, he developed a wound, an area of dehiscence in the mid anterior abdomen.  He states that there has been drainage from the site which has been purulent and foul-smelling and sometimes included specks of blood.  More recently, he had a fall at home 2 nights ago, he was in the laundry in his home and he fell while trying to grab the rollator he uses while walking, because the wheels were not locked.  He fell on his knees, was unable to get up, crawled and pushed the rollator ahead of him up against the wall in the kitchen,  locked the wheels, and then was able to pull himself up with some difficulty.  The following morning, which was yesterday, he had severe right-sided abdominal pain which progressively got worse to the point that he decided to seek medical attention today.  He had no nausea or vomiting.  He has diarrhea which began yesterday, he attributes it to some outside food that he had eaten the day before ([employer]).  The food was brought to him from a [employer] outing by a friend.  In the emergency department,  a CT scan of the abdomen and pelvis done without contrast showed a ventral hernia and mesenteric edema.  His initial lactic acid was 4.5, a repeat lactic acid was 1.2.  Hemoglobin was 12.8 and white blood cell count was normal.  Alkaline phosphatase was 178.  He is being admitted for further evaluation.     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 He started smoking about 37 years ago. He does not have any smokeless tobacco history on file. He reports current alcohol use. No history on file for drug use.    Family History  Family History   Problem Relation Name Age of Onset    Stroke Mother      Bipolar disorder Mother      Prostate cancer Father      Hypertension Sister      Hyperlipidemia Sister      Asthma Son      Colon cancer Paternal Grandfather          Allergies  Diazepam, Iodinated contrast media, Iodine, Penicillins, Acetaminophen, and Phenytoin sodium extended    Review of Systems    General: Negative for fever,  chills or fatigue.    HEENT: Negative for headache, blurring of vision or double vision.    Cardiovascular: Negative for chest pain, palpitations or orthopnea.    Respiratory: Negative for cough, shortness of breath or wheezing.    Gastrointestinal: As in the HPI   Genitourinary: Negative for dysuria, hematuria, frequency or nocturia.   Musculoskeletal: Negative for joint pain, joint swelling or deformity.   Skin: Negative for rash, itching, or jaundice.   Hematologic: Negative for bleeding or bruising.   Neurologic: Negative for headache, loss of consciousness. syncope or seizures    Physical Exam   General.: Awake alert well-developed, responsive   HEENT: Normocephalic, not  "icteric, not pale, no facial asymmetry, no pharyngeal erythema.   Neck: Supple, no carotid bruit, no thyroid enlargement.   Cardiovascular: Regular heart rate and rhythm normal S1 and S2.   Respiratory: Equal breath sounds bilaterally clear to auscultation.   Abdomen: Soft, there was a large ventral hernia in the center of which was a longitudinal superficial wound without active drainage or bleeding or surrounding erythema. There was tenderness on the right side of the abdomen.    Extremities: No peripheral cyanosis, no pedal edema.   Neurologic: Alert and oriented to self, place and date, muscle strength 5/5 in all extremities.   Dermatologic: No rash, ecchymosis, or jaundice.   Psychological: Appropriate affect and behavior.       Last Recorded Vitals  Blood pressure 121/68, pulse 100, temperature 37.1 °C (98.8 °F), resp. rate 18, height 1.727 m (5' 8\"), weight 73.5 kg (162 lb), SpO2 96%.    Relevant Results  Results for orders placed or performed during the hospital encounter of 05/29/24 (from the past 24 hour(s))   CBC and Auto Differential   Result Value Ref Range    WBC 7.8 4.4 - 11.3 x10*3/uL    nRBC 0.0 0.0 - 0.0 /100 WBCs    RBC 3.69 (L) 4.50 - 5.90 x10*6/uL    Hemoglobin 12.8 (L) 13.5 - 17.5 g/dL    Hematocrit 37.9 (L) 41.0 - 52.0 %     (H) 80 - 100 fL    MCH 34.7 (H) 26.0 - 34.0 pg    MCHC 33.8 32.0 - 36.0 g/dL    RDW 15.9 (H) 11.5 - 14.5 %    Platelets 151 150 - 450 x10*3/uL    Neutrophils % 71.9 40.0 - 80.0 %    Immature Granulocytes %, Automated 0.4 0.0 - 0.9 %    Lymphocytes % 17.9 13.0 - 44.0 %    Monocytes % 8.6 2.0 - 10.0 %    Eosinophils % 0.4 0.0 - 6.0 %    Basophils % 0.8 0.0 - 2.0 %    Neutrophils Absolute 5.62 1.20 - 7.70 x10*3/uL    Immature Granulocytes Absolute, Automated 0.03 0.00 - 0.70 x10*3/uL    Lymphocytes Absolute 1.40 1.20 - 4.80 x10*3/uL    Monocytes Absolute 0.67 0.10 - 1.00 x10*3/uL    Eosinophils Absolute 0.03 0.00 - 0.70 x10*3/uL    Basophils Absolute 0.06 0.00 - 0.10 " x10*3/uL   Comprehensive metabolic panel   Result Value Ref Range    Glucose 142 (H) 65 - 99 mg/dL    Sodium 131 (L) 133 - 145 mmol/L    Potassium 3.9 3.4 - 5.1 mmol/L    Chloride 94 (L) 97 - 107 mmol/L    Bicarbonate 21 (L) 24 - 31 mmol/L    Urea Nitrogen <3 (L) 8 - 25 mg/dL    Creatinine 0.40 0.40 - 1.60 mg/dL    eGFR >90 >60 mL/min/1.73m*2    Calcium 8.0 (L) 8.5 - 10.4 mg/dL    Albumin 2.9 (L) 3.5 - 5.0 g/dL    Alkaline Phosphatase 178 (H) 35 - 125 U/L    Total Protein 7.3 5.9 - 7.9 g/dL    AST 81 (H) 5 - 40 U/L    Bilirubin, Total 2.6 (H) 0.1 - 1.2 mg/dL    ALT 17 5 - 40 U/L    Anion Gap 16 <=19 mmol/L   Lipase   Result Value Ref Range    Lipase <16 (L) 16 - 63 U/L   BLOOD GAS LACTIC ACID, VENOUS   Result Value Ref Range    POCT Lactate, Venous 4.5 (HH) 0.4 - 2.0 mmol/L   Blood Gas Lactic Acid, Venous   Result Value Ref Range    POCT Lactate, Venous 1.2 0.4 - 2.0 mmol/L   Alcohol   Result Value Ref Range    Alcohol 0.051 (H) 0.000 - 0.010 g/dL   Bilirubin, Direct   Result Value Ref Range    Bilirubin, Direct 0.5 (H) 0.0 - 0.2 mg/dL   Urinalysis with Reflex Culture and Microscopic   Result Value Ref Range    Color, Urine Light-Yellow Light-Yellow, Yellow, Dark-Yellow    Appearance, Urine Clear Clear    Specific Gravity, Urine 1.004 (N) 1.005 - 1.035    pH, Urine 6.0 5.0, 5.5, 6.0, 6.5, 7.0, 7.5, 8.0    Protein, Urine NEGATIVE NEGATIVE, 10 (TRACE), 20 (TRACE) mg/dL    Glucose, Urine Normal Normal mg/dL    Blood, Urine NEGATIVE NEGATIVE    Ketones, Urine TRACE (A) NEGATIVE mg/dL    Bilirubin, Urine NEGATIVE NEGATIVE    Urobilinogen, Urine Normal Normal mg/dL    Nitrite, Urine 2+ (A) NEGATIVE    Leukocyte Esterase, Urine NEGATIVE NEGATIVE   Microscopic Only, Urine   Result Value Ref Range    WBC, Urine 1-5 1-5, NONE /HPF    RBC, Urine 1-2 NONE, 1-2, 3-5 /HPF    Bacteria, Urine 4+ (A) NONE SEEN /HPF    Mucus, Urine FEW Reference range not established. /LPF     CT abdomen pelvis wo IV contrast    Result Date:  5/29/2024  Interpreted By:  Diane Hernandez, STUDY: CT ABDOMEN PELVIS WO IV CONTRAST;  5/29/2024 5:47 pm   INDICATION: Signs/Symptoms:abdominal pain.   COMPARISON: 03/26/2019   ACCESSION NUMBER(S): [id number]   ORDERING CLINICIAN: OSMIN KELLOGG   TECHNIQUE: CT of the abdomen and pelvis was performed without IV contrast. Sagittal and coronal reconstructions.   FINDINGS: Limited evaluation for solid organs and vasculature without intravenous contrast.   Lower Chest: Streaky bibasilar, lingular, and right middle lobe atelectasis. Coronary artery calcifications.   Liver: There are several small hypodensities throughout the liver, too small to characterize. Enlargement of the left hepatic lobe. Questionable surface nodularity in the liver. Calcified granulomas in the liver.   Gallbladder and Biliary: Status post Whipple procedure.   Pancreas: Atrophic pancreas. Status post Whipple procedure.   Spleen: No abnormality identified in the spleen.   Adrenals: No abnormality identified in either adrenal gland.   Urinary: No parenchymal abnormality identified in either kidney. No hydronephrosis. Diffuse bladder wall thickening. Bilateral Hutch diverticula are again noted.   Gastrointestinal/Peritoneum: No small or large bowel obstruction in the visualized abdomen. In the abdomen, there is no extraluminal air. Small volume diffuse ascites. Diffuse mesenteric edema. Diffuse colonic diverticulosis. The appendix is not visualized.   Vascular: Abdominal aorta is normal in caliber. Atherosclerosis.   Lymphatics: No enlarged lymph nodes by size criteria.   MSK/Body Wall: No aggressive bony lesion identified. Multilevel degenerative change in the spine. Small fat containing bilateral inguinal hernias. Large upper abdominal ventral hernia containing fat and bowel loops, the neck measuring 8.4 cm, the sac measuring 17 cm. Small fat containing umbilical hernia. Additional small hernia just to the right of the umbilicus with the neck  measuring 1.9 cm and sac measuring 2.8 cm with internal stranding and fluid.       Large ventral hernia containing fat and bowel.   Small umbilical hernia and adjacent right paraumbilical hernia.   Diffuse mesenteric edema with small volume ascites.   Diffuse colonic diverticulosis.   Status post Whipple procedure.   Questionable cirrhotic liver morphology.   Diffuse bladder wall thickening is again noted. Bilateral Hutch diverticula are again noted.   Signed by: Diane Hernandez 5/29/2024 6:38 PM Dictation workstation:   [id number]        Assessment/Plan   Abdominal pain  Workup is in progress.  CT scan of the abdomen done without IV contrast (allergic to contrast dye) shows mesenteric edema.  There appears to be a component of his pain that may be muscular due to strain from pulling himself up from his recent fall.  At the same time, he reported purulent drainage from his anterior abdominal wound.  Empiric IV antibiotic coverage with meropenem.  General surgery consult    Ventral hernia  Does not appear to be incarcerated or obstructed    Hyperbilirubinemia  Check indirect bilirubin    Hyponatremia  May be secondary to volume depletion    History of duodenal cancer  Status post Whipple's procedure in 2019    Tobacco use  Smoking cessation is advised    The patient's home medicines were reviewed with him.  He had previously been taking baclofen, dicyclomine, gabapentin and Zofran and he said he no longer takes them.      Rowan Schroeder MD

## 2024-05-31 VITALS
OXYGEN SATURATION: 99 % | BODY MASS INDEX: 24.55 KG/M2 | RESPIRATION RATE: 17 BRPM | TEMPERATURE: 98.6 F | HEIGHT: 68 IN | SYSTOLIC BLOOD PRESSURE: 134 MMHG | WEIGHT: 162 LBS | HEART RATE: 71 BPM | DIASTOLIC BLOOD PRESSURE: 77 MMHG

## 2024-05-31 LAB
BACTERIA UR CULT: NO GROWTH
HAV IGM SER QL: NONREACTIVE
HBV CORE IGM SER QL: NONREACTIVE
HBV SURFACE AG SERPL QL IA: NONREACTIVE
HCV AB SER QL: NONREACTIVE

## 2024-05-31 PROCEDURE — 97530 THERAPEUTIC ACTIVITIES: CPT | Mod: GO

## 2024-05-31 PROCEDURE — 97535 SELF CARE MNGMENT TRAINING: CPT | Mod: GO

## 2024-05-31 PROCEDURE — G0378 HOSPITAL OBSERVATION PER HR: HCPCS

## 2024-05-31 PROCEDURE — 36415 COLL VENOUS BLD VENIPUNCTURE: CPT | Performed by: NURSE PRACTITIONER

## 2024-05-31 PROCEDURE — 96372 THER/PROPH/DIAG INJ SC/IM: CPT | Performed by: INTERNAL MEDICINE

## 2024-05-31 PROCEDURE — 2500000001 HC RX 250 WO HCPCS SELF ADMINISTERED DRUGS (ALT 637 FOR MEDICARE OP): Performed by: INTERNAL MEDICINE

## 2024-05-31 PROCEDURE — 86705 HEP B CORE ANTIBODY IGM: CPT | Mod: WESLAB | Performed by: NURSE PRACTITIONER

## 2024-05-31 PROCEDURE — 2500000004 HC RX 250 GENERAL PHARMACY W/ HCPCS (ALT 636 FOR OP/ED): Performed by: INTERNAL MEDICINE

## 2024-05-31 PROCEDURE — 96376 TX/PRO/DX INJ SAME DRUG ADON: CPT

## 2024-05-31 RX ORDER — QUETIAPINE FUMARATE 100 MG/1
50 TABLET, FILM COATED ORAL NIGHTLY
Start: 2024-05-31

## 2024-05-31 RX ORDER — CEPHALEXIN 500 MG/1
500 CAPSULE ORAL 4 TIMES DAILY
Qty: 28 CAPSULE | Refills: 0 | Status: SHIPPED | OUTPATIENT
Start: 2024-05-31

## 2024-05-31 RX ADMIN — METOPROLOL TARTRATE 25 MG: 25 TABLET, FILM COATED ORAL at 08:24

## 2024-05-31 RX ADMIN — Medication 100 MG: at 08:24

## 2024-05-31 RX ADMIN — ENOXAPARIN SODIUM 40 MG: 40 INJECTION SUBCUTANEOUS at 08:24

## 2024-05-31 RX ADMIN — OXYCODONE HYDROCHLORIDE AND ACETAMINOPHEN 1000 MG: 500 TABLET ORAL at 08:24

## 2024-05-31 RX ADMIN — PANTOPRAZOLE SODIUM 40 MG: 40 TABLET, DELAYED RELEASE ORAL at 06:08

## 2024-05-31 RX ADMIN — MEROPENEM 1 G: 1 INJECTION, POWDER, FOR SOLUTION INTRAVENOUS at 05:00

## 2024-05-31 RX ADMIN — ALPRAZOLAM 1 MG: 0.5 TABLET ORAL at 08:41

## 2024-05-31 RX ADMIN — MORPHINE SULFATE 2 MG: 2 INJECTION, SOLUTION INTRAMUSCULAR; INTRAVENOUS at 10:57

## 2024-05-31 ASSESSMENT — COGNITIVE AND FUNCTIONAL STATUS - GENERAL
HELP NEEDED FOR BATHING: A LOT
DAILY ACTIVITIY SCORE: 16
CLIMB 3 TO 5 STEPS WITH RAILING: A LITTLE
TURNING FROM BACK TO SIDE WHILE IN FLAT BAD: A LITTLE
DRESSING REGULAR UPPER BODY CLOTHING: A LITTLE
PERSONAL GROOMING: A LITTLE
DRESSING REGULAR LOWER BODY CLOTHING: A LITTLE
STANDING UP FROM CHAIR USING ARMS: A LITTLE
DRESSING REGULAR UPPER BODY CLOTHING: A LITTLE
MOBILITY SCORE: 18
MOVING FROM LYING ON BACK TO SITTING ON SIDE OF FLAT BED WITH BEDRAILS: A LITTLE
TOILETING: TOTAL
DRESSING REGULAR LOWER BODY CLOTHING: A LOT
MOVING TO AND FROM BED TO CHAIR: A LITTLE
HELP NEEDED FOR BATHING: A LOT
PERSONAL GROOMING: A LITTLE
WALKING IN HOSPITAL ROOM: A LITTLE
EATING MEALS: A LITTLE
DAILY ACTIVITIY SCORE: 15
TOILETING: A LOT

## 2024-05-31 ASSESSMENT — PAIN SCALES - GENERAL
PAINLEVEL_OUTOF10: 4
PAINLEVEL_OUTOF10: 3

## 2024-05-31 ASSESSMENT — ACTIVITIES OF DAILY LIVING (ADL): HOME_MANAGEMENT_TIME_ENTRY: 12

## 2024-05-31 ASSESSMENT — PAIN DESCRIPTION - DESCRIPTORS
DESCRIPTORS: ACHING;STABBING
DESCRIPTORS: ACHING

## 2024-05-31 ASSESSMENT — PAIN - FUNCTIONAL ASSESSMENT
PAIN_FUNCTIONAL_ASSESSMENT: 0-10
PAIN_FUNCTIONAL_ASSESSMENT: 0-10

## 2024-05-31 NOTE — PROGRESS NOTES
Occupational Therapy    OT Treatment    Patient Name: Nils Hogan  MRN: [id number]  Today's Date: 5/31/2024  Time Calculation  Start Time: 1121  Stop Time: 1144  Time Calculation (min): 23 min    Assessment:  OT Assessment: Patient tolerated session better this date, decreased pain , no dizziness this date, but continues to be limited with decreased strength, decrease balance, decreased endurance, decreased safety, is a fall risk, will benefit from continued OT intervention.  Prognosis: Good  Barriers to Discharge: Decreased caregiver support  Evaluation/Treatment Tolerance: Patient limited by pain  Medical Staff Made Aware: Yes  End of Session Communication: PCT/NA/CTA  End of Session Patient Position: Up in chair, Alarm on (pure wick on.)  OT Assessment Results: Decreased ADL status, Decreased upper extremity strength, Decreased upper extremity range of motion, Decreased safe judgment during ADL, Decreased cognition, Decreased endurance, Visual deficit, Decreased fine motor control, Decreased functional mobility, Decreased gross motor control, Decreased IADLs  Prognosis: Good  Barriers to Discharge: Decreased caregiver support  Evaluation/Treatment Tolerance: Patient limited by pain  Medical Staff Made Aware: Yes  Strengths: Premorbid level of function  Barriers to Participation: Comorbidities  Plan:  Treatment Interventions: ADL retraining, Functional transfer training, UE strengthening/ROM, Endurance training, Cognitive reorientation, Patient/family training, Equipment evaluation/education, Compensatory technique education  OT Frequency: 4 times per week  OT Discharge Recommendations: Moderate intensity level of continued care  Equipment Recommended upon Discharge: Wheeled walker  OT Recommended Transfer Status: Assist of 1  OT - OK to Discharge: Yes  Treatment Interventions: ADL retraining, Functional transfer training, UE strengthening/ROM, Endurance training, Cognitive reorientation, Patient/family training,  Equipment evaluation/education, Compensatory technique education    Subjective   Previous Visit Info:  OT Last Visit  OT Received On: 05/31/24  General:  General  Reason for Referral: impaired functional mobility; pt is a 54 year-old M admitted from home with c/o abdominal pain, fall, and drainage from a wound; CT abdomen (+) ventral hernia and mesenteric edema; pt awaiting sx consult.  Family/Caregiver Present: No  Prior to Session Communication: Bedside nurse  Patient Position Received: Bed, 3 rail up, Alarm off, not on at start of session  Preferred Learning Style: verbal  General Comment: Cleared by nurse to see patient for OT, patient agreeable to therapy. Patient reports going home today    Pain:  Pain Assessment  Pain Assessment: 0-10  Pain Score: 3  Pain Type: Acute pain  Pain Location: Abdomen  Pain Orientation: Right  Pain Descriptors: Aching  Pain Frequency: Constant/continuous  Pain Interventions: Repositioned, Emotional support (patient reports receiving pain medication .)  Response to Interventions: Patient reports pain medication helping.    Objective    Cognition:  Cognition  Overall Cognitive Status: Impaired (safey, insight/judgement.)  Safety Judgment: Decreased awareness of need for assistance  Problem Solving: Exceptions to WFL  Safety/Judgement: Exceptions to WFL  Complex Functional Tasks: Moderate  Insight: Moderate  Impulsive: Moderately  Coordination:     Activities of Daily Living:      Grooming  Grooming Level of Assistance: Setup, Close supervision  Grooming Where Assessed: Chair  Grooming Comments: Patient was set up to complete teeth ,face hygiene this date.    UE Bathing  UE Bathing Level of Assistance: Setup, Close supervision  UE Bathing Where Assessed: Other (Comment)  UE Bathing Comments: sitting in the chair this date after set up    UE Dressing  UE Dressing Level of Assistance: Minimum assistance  UE Dressing Where Assessed: Edge of bed  UE Dressing Comments: ruslan/faina TOBIAS  Dressing  LE Dressing: Yes  Sock Level of Assistance: Close supervision, Setup  LE Dressing Where Assessed: Chair  LE Dressing Comments: no AD, increased time.    Toileting  Toileting Comments: Patient with purewick,, reports incontinent.  Functional Standing Tolerance:  Time: 2min  Activity: funcitonal mobility with the rollator  Bed Mobility/Transfers: Bed Mobility  Bed Mobility: Yes (Patient needed CGA/Close Supervision this date to get to the EOB this date, bed raised 30 degrees this date.)    Transfers  Transfer: Yes (Patient needed minimal assist this date with minimal V/C to stand from the bed and chair this date with the rollator, cues to lock break and to slow down .)    Functional Mobility:  Functional Mobility  Functional Mobility Performed: Yes (Patient needed minimal assist with minimal V/C to take 4-5 small steps to the chair this date with use of rollator, cues to lock breaks on the rollator.)  Sitting Balance:  Static Sitting Balance  Static Sitting-Comment/Number of Minutes: F+/G, 3-4min  Dynamic Sitting Balance  Dynamic Sitting-Comments: F+/G 3-4min  Standing Balance:  Static Standing Balance  Static Standing-Comment/Number of Minutes: F/F+, 2min  Dynamic Standing Balance  Dynamic Standing-Comments: F/F+, 2min    Therapy/Activity: Therapeutic Activity  Therapeutic Activity Performed: Yes (Patient worked on sitting and standing balance tasks this date.)    Outcome Measures:[facility] Daily Activity  Putting on and taking off regular lower body clothing: A little  Bathing (including washing, rinsing, drying): A lot  Putting on and taking off regular upper body clothing: A little  Toileting, which includes using toilet, bedpan or urinal: Total  Taking care of personal grooming such as brushing teeth: A little  Eating Meals: A little  Daily Activity - Total Score: 15        Education Documentation  No documentation found.  Education Comments  No comments found.        OP EDUCATION:       Goals:  Encounter  Problems       Encounter Problems (Active)       OT Goals       Patient will be able to complete UB/LB dressing, bathing, toileting with Close Supervision, use of AE as needed. (Progressing)       Start:  05/30/24    Expected End:  06/13/24            Patient will be able to complete functional transfers with least restrictive device with Close Supervision with G balance using good safety. (Progressing)       Start:  05/30/24    Expected End:  06/13/24            Patient will be able to complete functional mobility with the least restrictive device with Close Supervision using good safety and with G balance. (Progressing)       Start:  05/30/24    Expected End:  06/13/24            Patient will be able to tolerate 10 min of functional sitting and standing tasks with G balance in prep for ADL/transfers. (Progressing)       Start:  05/30/24    Expected End:  06/13/24

## 2024-05-31 NOTE — DISCHARGE SUMMARY
Discharge Diagnosis  Abdominal pain    Issues Requiring Follow-Up  Follow up with general surgery, GI, and PCP.     Test Results Pending At Discharge  Pending Labs       Order Current Status    Hepatitis panel, acute In process    Blood Culture Preliminary result    Blood Culture Preliminary result            Hospital Course   Nils Hogan is a 54 y.o. male with a past medical history of Bipolar I disorder, CVA, duodenal CA, HLD, HTN, and epilepsy who presented to the ED with complaints of abdominal pain that started the day prior. He receives his care primarily through the [facility].  He was previously diagnosed with duodenal cancer in 2019 and underwent Whipple's procedure on 4/22/2019 at the [facility].  He states that sometime in 2022, he lifted a large container of water onto a water cooler in his kitchen and the following day, he noticed a bulge in the anterior abdomen which was a hernia.  It progressively got larger over the next several months.  About 9 or 10 months ago, he developed a wound, an area of dehiscence in the mid anterior abdomen.  He states that there has been drainage from the site which has been purulent and foul-smelling and sometimes included specks of blood. More recently, he had a fall at home 2 nights prior, he was in the laundry in his home and he fell while trying to grab the rollator he uses while walking, because the wheels were not locked.  He fell on his knees, was unable to get up, crawled and pushed the rollator ahead of him up against the wall in the kitchen,  locked the wheels, and then was able to pull himself up with some difficulty.  The following morning, which was yesterday, he had severe right-sided abdominal pain which progressively got worse to the point that he decided to seek medical attention today.  He had diarrhea, he attributes it to some outside food that he had eaten the day before ([employer]).  The food was brought to him from a [employer]  outing by a friend. In the emergency department,  a CT scan of the abdomen and pelvis done without contrast showed a ventral hernia and mesenteric edema.  His initial lactic acid was 4.5, a repeat lactic acid was 1.2.  Hemoglobin was 12.8 and white blood cell count was normal.  Alkaline phosphatase was 178.  He is being admitted for further evaluation.    General surgery was consulted. No indication for emergency surgical intervention. 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 Cleared for regular diet. He was started on IV ATB emperically. Cleared by general surgery for discharge. ATB transitioned to oral. Seen by PT/OT who recommended moderate intensity rehab. He declines SNF, agreeable to [facility]. He was instructed to follow up with general surgery, PCP, as well as GI regarding abnormal LFTs. Symptoms have improved, no further ABD pain. Stable for discharge home with [facility] today.        Pertinent Physical Exam At Time of Discharge  Physical Exam  Vitals reviewed.   Constitutional:       Appearance: Normal appearance.   HENT:      Head: Normocephalic and atraumatic.   Eyes:      Extraocular Movements: Extraocular movements intact.      Conjunctiva/sclera: Conjunctivae normal.   Cardiovascular:      Rate and Rhythm: Normal rate and regular rhythm.   Pulmonary:      Effort: Pulmonary effort is normal.      Breath sounds: Normal breath sounds. No wheezing, rhonchi or rales.   Abdominal:      General: Bowel sounds are normal. There is distension.      Palpations: Abdomen is soft.      Tenderness: There is no abdominal tenderness.   Skin:     General: Skin is warm and dry.      Comments: Open ulcer to ABD without drainage noted, dressing intact   Neurological:      General: No focal deficit present.      Mental Status: He is alert and oriented to person, place, and time.         Home Medications     Medication List      START  taking these medications     cephalexin 500 mg capsule; Commonly known as: Keflex; Take 1 capsule   (500 mg) by mouth 4 times a day.     CONTINUE taking these medications     ALPRAZolam 1 mg tablet; Commonly known as: Xanax   ascorbic acid 1,000 mg tablet; Commonly known as: Vitamin C   aspirin 81 mg EC tablet   D3-2000 50 mcg (2,000 unit) capsule; Generic drug: cholecalciferol   metoprolol tartrate 25 mg tablet; Commonly known as: Lopressor   pantoprazole 40 mg EC tablet; Commonly known as: ProtoNix   QUEtiapine 100 mg tablet; Commonly known as: SEROquel; Take 0.5 tablets   (50 mg) by mouth once daily at bedtime.   zinc gluconate 50 mg tablet       Outpatient Follow-Up  No future appointments.    Time spent on discharge: 35 minutes    KIMBERLEY Rodriguez-CNP

## 2024-05-31 NOTE — NURSING NOTE
Patient requesting to leave, LPN spoke with Saloni Lincoln, Saloni put in discharge paperwork, LPN discussed discharge paperwork with patient. IV removed, Patient leaving facility.

## 2024-05-31 NOTE — PROGRESS NOTES
Met with patient at bedside to discuss discharge planning.  Discussed MOD level therapy recommendations, patient is refusing skilled rehab.  Patient states that he is feeling much stronger today and feels safe returning home.  Patient states that he has a large support system from neighbors and friends, and he has transportation home.  Patient is agreeable to home health care on discharge, provider updated, external referral received.       [facility] referral built and sent with final discharge orders and [facility]CO attached, awaiting response from an accepting agency at this time.     UPDATE:  Elina Crabtree [facility] accepted referral, discharge planning is completed.          05/31/24 1332   Discharge Planning   Home or Post Acute Services In home services   Type of Home Care Services Home PT   Patient expects to be discharged to: Home with Home Health Care   Does the patient need discharge transport arranged? No   RoundTrip coordination needed? No   Patient Choice   Provider Choice list and CMS website (https://medicare.gov/care-compare#search) for post-acute Quality and Resource Measure Data were provided and reviewed with: Patient   Patient / Family choosing to utilize agency / facility established prior to hospitalization No

## 2024-05-31 NOTE — PROGRESS NOTES
Physical Therapy                 Therapy Communication Note    Patient Name: Nils Hogan  MRN: [id number]  Today's Date: 5/31/2024     Discipline: Physical Therapy    Missed Visit Reason: Missed Visit Reason: Patient refused (Pt refusing d/t agitation/dissapointment with discharge plans.)    Missed Time: Attempt    Comment:

## 2024-06-03 LAB
BACTERIA BLD CULT: NORMAL
BACTERIA BLD CULT: NORMAL

## 2024-06-05 ENCOUNTER — HOSPITAL ENCOUNTER (EMERGENCY)
Facility: HOSPITAL | Age: 54
Discharge: LEFT BEFORE DISCHARGE | End: 2024-06-05
Attending: STUDENT IN AN ORGANIZED HEALTH CARE EDUCATION/TRAINING PROGRAM
Payer: COMMERCIAL

## 2024-06-05 ENCOUNTER — APPOINTMENT (OUTPATIENT)
Dept: RADIOLOGY | Facility: HOSPITAL | Age: 54
End: 2024-06-05
Payer: COMMERCIAL

## 2024-06-05 VITALS
HEIGHT: 68 IN | RESPIRATION RATE: 18 BRPM | DIASTOLIC BLOOD PRESSURE: 75 MMHG | TEMPERATURE: 98.3 F | SYSTOLIC BLOOD PRESSURE: 128 MMHG | BODY MASS INDEX: 23.79 KG/M2 | OXYGEN SATURATION: 96 % | WEIGHT: 157 LBS | HEART RATE: 97 BPM

## 2024-06-05 DIAGNOSIS — K56.609 SMALL BOWEL OBSTRUCTION (MULTI): ICD-10-CM

## 2024-06-05 DIAGNOSIS — E87.6 HYPOKALEMIA: Primary | ICD-10-CM

## 2024-06-05 DIAGNOSIS — K43.9 VENTRAL HERNIA WITHOUT OBSTRUCTION OR GANGRENE: ICD-10-CM

## 2024-06-05 LAB
ALBUMIN SERPL-MCNC: 3 G/DL (ref 3.5–5)
ALP BLD-CCNC: 202 U/L (ref 35–125)
ALT SERPL-CCNC: 24 U/L (ref 5–40)
ANION GAP SERPL CALC-SCNC: 9 MMOL/L
APPEARANCE UR: CLEAR
AST SERPL-CCNC: 93 U/L (ref 5–40)
BASOPHILS # BLD AUTO: 0.08 X10*3/UL (ref 0–0.1)
BASOPHILS NFR BLD AUTO: 1.2 %
BILIRUB SERPL-MCNC: 1.9 MG/DL (ref 0.1–1.2)
BILIRUB UR STRIP.AUTO-MCNC: NEGATIVE MG/DL
BUN SERPL-MCNC: <3 MG/DL (ref 8–25)
CALCIUM SERPL-MCNC: 8.1 MG/DL (ref 8.5–10.4)
CHLORIDE SERPL-SCNC: 95 MMOL/L (ref 97–107)
CO2 SERPL-SCNC: 25 MMOL/L (ref 24–31)
COLOR UR: ABNORMAL
CREAT SERPL-MCNC: 0.4 MG/DL (ref 0.4–1.6)
EGFRCR SERPLBLD CKD-EPI 2021: >90 ML/MIN/1.73M*2
EOSINOPHIL # BLD AUTO: 0.13 X10*3/UL (ref 0–0.7)
EOSINOPHIL NFR BLD AUTO: 2 %
ERYTHROCYTE [DISTWIDTH] IN BLOOD BY AUTOMATED COUNT: 15.7 % (ref 11.5–14.5)
GLUCOSE SERPL-MCNC: 143 MG/DL (ref 65–99)
GLUCOSE UR STRIP.AUTO-MCNC: NORMAL MG/DL
HCT VFR BLD AUTO: 40.1 % (ref 41–52)
HGB BLD-MCNC: 13.6 G/DL (ref 13.5–17.5)
IMM GRANULOCYTES # BLD AUTO: 0.02 X10*3/UL (ref 0–0.7)
IMM GRANULOCYTES NFR BLD AUTO: 0.3 % (ref 0–0.9)
KETONES UR STRIP.AUTO-MCNC: NEGATIVE MG/DL
LACTATE BLDV-SCNC: 1.8 MMOL/L (ref 0.4–2)
LEUKOCYTE ESTERASE UR QL STRIP.AUTO: NEGATIVE
LYMPHOCYTES # BLD AUTO: 1.59 X10*3/UL (ref 1.2–4.8)
LYMPHOCYTES NFR BLD AUTO: 24 %
MAGNESIUM SERPL-MCNC: 1.6 MG/DL (ref 1.6–3.1)
MCH RBC QN AUTO: 34.4 PG (ref 26–34)
MCHC RBC AUTO-ENTMCNC: 33.9 G/DL (ref 32–36)
MCV RBC AUTO: 102 FL (ref 80–100)
MONOCYTES # BLD AUTO: 0.93 X10*3/UL (ref 0.1–1)
MONOCYTES NFR BLD AUTO: 14 %
NEUTROPHILS # BLD AUTO: 3.87 X10*3/UL (ref 1.2–7.7)
NEUTROPHILS NFR BLD AUTO: 58.5 %
NITRITE UR QL STRIP.AUTO: NEGATIVE
NRBC BLD-RTO: 0 /100 WBCS (ref 0–0)
PH UR STRIP.AUTO: 6.5 [PH]
PLATELET # BLD AUTO: 155 X10*3/UL (ref 150–450)
POTASSIUM SERPL-SCNC: 3.3 MMOL/L (ref 3.4–5.1)
PROT SERPL-MCNC: 7.5 G/DL (ref 5.9–7.9)
PROT UR STRIP.AUTO-MCNC: NEGATIVE MG/DL
RBC # BLD AUTO: 3.95 X10*6/UL (ref 4.5–5.9)
RBC # UR STRIP.AUTO: NEGATIVE /UL
SODIUM SERPL-SCNC: 129 MMOL/L (ref 133–145)
SP GR UR STRIP.AUTO: 1
UROBILINOGEN UR STRIP.AUTO-MCNC: NORMAL MG/DL
WBC # BLD AUTO: 6.6 X10*3/UL (ref 4.4–11.3)

## 2024-06-05 PROCEDURE — 74176 CT ABD & PELVIS W/O CONTRAST: CPT | Performed by: RADIOLOGY

## 2024-06-05 PROCEDURE — 96375 TX/PRO/DX INJ NEW DRUG ADDON: CPT

## 2024-06-05 PROCEDURE — 2500000001 HC RX 250 WO HCPCS SELF ADMINISTERED DRUGS (ALT 637 FOR MEDICARE OP): Performed by: CLINICAL NURSE SPECIALIST

## 2024-06-05 PROCEDURE — 83735 ASSAY OF MAGNESIUM: CPT | Performed by: CLINICAL NURSE SPECIALIST

## 2024-06-05 PROCEDURE — 96374 THER/PROPH/DIAG INJ IV PUSH: CPT

## 2024-06-05 PROCEDURE — 96372 THER/PROPH/DIAG INJ SC/IM: CPT | Performed by: CLINICAL NURSE SPECIALIST

## 2024-06-05 PROCEDURE — 2500000004 HC RX 250 GENERAL PHARMACY W/ HCPCS (ALT 636 FOR OP/ED): Performed by: CLINICAL NURSE SPECIALIST

## 2024-06-05 PROCEDURE — 85025 COMPLETE CBC W/AUTO DIFF WBC: CPT | Performed by: CLINICAL NURSE SPECIALIST

## 2024-06-05 PROCEDURE — 99284 EMERGENCY DEPT VISIT MOD MDM: CPT | Mod: 25

## 2024-06-05 PROCEDURE — 82374 ASSAY BLOOD CARBON DIOXIDE: CPT | Performed by: CLINICAL NURSE SPECIALIST

## 2024-06-05 PROCEDURE — 74176 CT ABD & PELVIS W/O CONTRAST: CPT

## 2024-06-05 PROCEDURE — 96361 HYDRATE IV INFUSION ADD-ON: CPT

## 2024-06-05 PROCEDURE — 83605 ASSAY OF LACTIC ACID: CPT | Performed by: CLINICAL NURSE SPECIALIST

## 2024-06-05 PROCEDURE — 36415 COLL VENOUS BLD VENIPUNCTURE: CPT | Performed by: CLINICAL NURSE SPECIALIST

## 2024-06-05 PROCEDURE — 81003 URINALYSIS AUTO W/O SCOPE: CPT | Performed by: CLINICAL NURSE SPECIALIST

## 2024-06-05 RX ORDER — ONDANSETRON HYDROCHLORIDE 2 MG/ML
4 INJECTION, SOLUTION INTRAVENOUS ONCE
Status: COMPLETED | OUTPATIENT
Start: 2024-06-05 | End: 2024-06-05

## 2024-06-05 RX ORDER — POTASSIUM CHLORIDE 1.5 G/1.58G
40 POWDER, FOR SOLUTION ORAL ONCE
Status: COMPLETED | OUTPATIENT
Start: 2024-06-05 | End: 2024-06-05

## 2024-06-05 RX ORDER — MORPHINE SULFATE 4 MG/ML
4 INJECTION, SOLUTION INTRAMUSCULAR; INTRAVENOUS ONCE
Status: COMPLETED | OUTPATIENT
Start: 2024-06-05 | End: 2024-06-05

## 2024-06-05 RX ORDER — DICYCLOMINE HYDROCHLORIDE 10 MG/ML
20 INJECTION INTRAMUSCULAR ONCE
Status: COMPLETED | OUTPATIENT
Start: 2024-06-05 | End: 2024-06-05

## 2024-06-05 RX ADMIN — SODIUM CHLORIDE 2136 ML: 900 INJECTION, SOLUTION INTRAVENOUS at 17:06

## 2024-06-05 RX ADMIN — POTASSIUM CHLORIDE 40 MEQ: 1.5 FOR SOLUTION ORAL at 18:33

## 2024-06-05 RX ADMIN — MORPHINE SULFATE 4 MG: 4 INJECTION, SOLUTION INTRAMUSCULAR; INTRAVENOUS at 18:33

## 2024-06-05 RX ADMIN — DICYCLOMINE HYDROCHLORIDE 20 MG: 20 INJECTION, SOLUTION INTRAMUSCULAR at 17:07

## 2024-06-05 RX ADMIN — ONDANSETRON 4 MG: 2 INJECTION INTRAMUSCULAR; INTRAVENOUS at 18:36

## 2024-06-05 ASSESSMENT — PAIN DESCRIPTION - LOCATION: LOCATION: ABDOMEN

## 2024-06-05 ASSESSMENT — PAIN DESCRIPTION - ORIENTATION: ORIENTATION: RIGHT

## 2024-06-05 ASSESSMENT — PAIN SCALES - GENERAL: PAINLEVEL_OUTOF10: 10 - WORST POSSIBLE PAIN

## 2024-06-05 ASSESSMENT — PAIN - FUNCTIONAL ASSESSMENT: PAIN_FUNCTIONAL_ASSESSMENT: 0-10

## 2024-06-05 ASSESSMENT — COLUMBIA-SUICIDE SEVERITY RATING SCALE - C-SSRS
1. IN THE PAST MONTH, HAVE YOU WISHED YOU WERE DEAD OR WISHED YOU COULD GO TO SLEEP AND NOT WAKE UP?: NO
6. HAVE YOU EVER DONE ANYTHING, STARTED TO DO ANYTHING, OR PREPARED TO DO ANYTHING TO END YOUR LIFE?: NO
2. HAVE YOU ACTUALLY HAD ANY THOUGHTS OF KILLING YOURSELF?: NO

## 2024-06-05 ASSESSMENT — PAIN DESCRIPTION - FREQUENCY: FREQUENCY: CONSTANT/CONTINUOUS

## 2024-06-05 ASSESSMENT — PAIN DESCRIPTION - DESCRIPTORS: DESCRIPTORS: SHARP

## 2024-06-05 NOTE — ED TRIAGE NOTES
Pt brought in by squad, from home. Pt states he is having right sided abd pain 10/10. Pt presents with an incisional wound from past whipple surgery. Pt has a 6 in wound on center of abd. Pts right side of abd has the most pain. Describing it as a continuous sharp pain

## 2024-06-05 NOTE — ED PROVIDER NOTES
Department of Emergency Medicine   ED  Provider Note  Admit Date/RoomTime: 6/5/2024  4:10 PM  ED Room: TOMAS/TOMAS        History of Present Illness:  Chief Complaint   Patient presents with    Abdominal Pain         Nils Hogan is a 54 y.o. 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  He was admitted to the hospital on 5/31/2024 for abdominal pain lactic acidosis.  Was evaluated by surgery no emergent surgery indicated.  Advised to follow-up for enterocutaneous fistula and ventral hernia repair as outpatient.  Presenting to the ED for abdominal pain patient reports he was doing fine at home after being discharged while taking the Keflex.  But then noticed severe sharp stabbing pain in his right abdomen.  He has had continued diarrhea since he started the antibiotic.  No fever chills.  No nausea or vomiting.  Patient reports the open wound to his abdomen will dry up the turn red then have purulent drainage.    Review of Systems:   Pertinent positives and negatives are stated within HPI, all other systems reviewed and are negative.        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  Past Surgical History:  has a past surgical history that includes Adenoidectomy; Whipple procedure (04/22/2019); and Urethra surgery.  Social History:  reports that he has been smoking cigarettes. He started smoking about 37 years ago. He has a 9.4 pack-year smoking history. He does not have any smokeless tobacco history on file. He reports current alcohol use.  Family History: family history includes  Asthma in his son; Bipolar disorder in his mother; Colon cancer in his paternal grandfather; Hyperlipidemia in his sister; Hypertension in his sister; Prostate cancer in his father; Stroke in his mother.. Unless otherwise noted, family history is non contributory  The patient’s home medications have been reviewed.  Allergies: Diazepam, Iodinated contrast media, Iodine, Zosyn [piperacillin-tazobactam], Acetaminophen, and Phenytoin sodium extended        ---------------------------------------------------PHYSICAL EXAM--------------------------------------    GENERAL APPEARANCE: Awake and alert.   VITAL SIGNS: As per the nurses' triage record.   HEENT: Normocephalic, atraumatic. Extraocular muscles are intact. Pupils equal round and reactive to light. Conjunctiva are pink. Negative scleral icterus. Mucous membranes are moist. Tongue in the midline. Pharynx was without erythema or exudates, uvula midline  NECK: Soft Nontender and supple, full gross ROM, no meningeal signs.  CHEST: Nontender to palpation. Clear to auscultation bilaterally. No rales, rhonchi, or wheezing.   HEART: S1, S2. Regular rate and rhythm. No murmurs, gallops or rubs.  Strong and equal pulses in the extremities.   ABDOMEN: Large ventral hernia noted, tender to palpation to the right side hernia.  4 x 2 area over the hernia that is red dried scab.  Patient has not red streak to the left upper quadrant from the wound.  Abdomen is warm to touch.  MUSCULCSKELETAL: The calves are nontender to palpation. Full gross active range of motion.   NEUROLOGICAL: Awake, alert and oriented x 3. Power intact in the upper and lower extremities. Sensation is intact to light touch in the upper and lower extremities.   IMMUNOLOGICAL: No lymphatic streaking noted   DERM: No petechiae,           ------------------------- NURSING NOTES AND VITALS REVIEWED ---------------------------  The nursing notes within the ED encounter and vital signs as below have been reviewed by  "myself  /75   Pulse 97   Temp 36.8 °C (98.3 °F)   Resp 18   Ht 1.727 m (5' 8\")   Wt 71.2 kg (157 lb)   SpO2 96%   BMI 23.87 kg/m²     Oxygen Saturation Interpretation: 96%          The patient’s available past medical records and past encounters were reviewed.          -----------------------DIAGNOSTIC RESULTS------------------------  LABS:    Labs Reviewed   CBC WITH AUTO DIFFERENTIAL - Abnormal       Result Value    WBC 6.6      nRBC 0.0      RBC 3.95 (*)     Hemoglobin 13.6      Hematocrit 40.1 (*)      (*)     MCH 34.4 (*)     MCHC 33.9      RDW 15.7 (*)     Platelets 155      Neutrophils % 58.5      Immature Granulocytes %, Automated 0.3      Lymphocytes % 24.0      Monocytes % 14.0      Eosinophils % 2.0      Basophils % 1.2      Neutrophils Absolute 3.87      Immature Granulocytes Absolute, Automated 0.02      Lymphocytes Absolute 1.59      Monocytes Absolute 0.93      Eosinophils Absolute 0.13      Basophils Absolute 0.08     COMPREHENSIVE METABOLIC PANEL - Abnormal    Glucose 143 (*)     Sodium 129 (*)     Potassium 3.3 (*)     Chloride 95 (*)     Bicarbonate 25      Urea Nitrogen <3 (*)     Creatinine 0.40      eGFR >90      Calcium 8.1 (*)     Albumin 3.0 (*)     Alkaline Phosphatase 202 (*)     Total Protein 7.5      AST 93 (*)     Bilirubin, Total 1.9 (*)     ALT 24      Anion Gap 9     URINALYSIS WITH REFLEX CULTURE AND MICROSCOPIC - Abnormal    Color, Urine Light-Yellow      Appearance, Urine Clear      Specific Gravity, Urine 1.004 (*)     pH, Urine 6.5      Protein, Urine NEGATIVE      Glucose, Urine Normal      Blood, Urine NEGATIVE      Ketones, Urine NEGATIVE      Bilirubin, Urine NEGATIVE      Urobilinogen, Urine Normal      Nitrite, Urine NEGATIVE      Leukocyte Esterase, Urine NEGATIVE     BLOOD GAS LACTIC ACID, VENOUS - Normal    POCT Lactate, Venous 1.8     MAGNESIUM - Normal    Magnesium 1.60     URINALYSIS WITH REFLEX CULTURE AND MICROSCOPIC    Narrative:     The " following orders were created for panel order Urinalysis with Reflex Culture and Microscopic.  Procedure                               Abnormality         Status                     ---------                               -----------         ------                     Urinalysis with Reflex C...[[id number]]  Abnormal            Final result               Extra Urine Gray Tube[[id number]]                            In process                   Please view results for these tests on the individual orders.   EXTRA URINE GRAY TUBE       As interpreted by me, the above displayed labs are abnormal. All other labs obtained during this visit were within normal range or not returned as of this dictation.      CT abdomen pelvis wo IV contrast   Final Result   1.  Ventral abdominal wall hernia containing several cm of small   bowel. The bowel loops extend into and out of the hernia multiple   times and there is a transition of the bowel caliber within the   hernia. The bowel is more dilated than on the recent prior study,   suspect partial obstruction. No pneumatosis or evidence of   perforation. A separate smaller hernia right infraumbilical region,   contains a knuckle of small bowel which is more distended than   previous, could potentially be incarcerated. Surgical consultation is   suggested.   2. Status post Whipple. There is a hypodensity in the liver bordering   the left and right lobes which is indeterminate. Follow-up MR   suggested when the patient's acute symptoms have been addressed.   3. Cirrhotic appearance of the liver with steatosis.   4. Small amount of free intraperitoneal fluid with some areas of   slightly loculated fluid along the liver and right paracolic gutter,   may be postoperative.   5. Mesenteric lymphadenopathy may be reactive or metastatic.   6. Colonic diverticulosis. No evidence of diverticulitis.   7. Distended bladder, appears slightly retracted toward the right   side of the abdomen, of  uncertain significance. Bilateral bladder   diverticula are noted, likely due to chronic outflow obstruction.                  Signed by: Samara Christina 6/5/2024 6:52 PM   Dictation workstation:   [id number]              CT abdomen pelvis wo IV contrast   Final Result   1.  Ventral abdominal wall hernia containing several cm of small   bowel. The bowel loops extend into and out of the hernia multiple   times and there is a transition of the bowel caliber within the   hernia. The bowel is more dilated than on the recent prior study,   suspect partial obstruction. No pneumatosis or evidence of   perforation. A separate smaller hernia right infraumbilical region,   contains a knuckle of small bowel which is more distended than   previous, could potentially be incarcerated. Surgical consultation is   suggested.   2. Status post Whipple. There is a hypodensity in the liver bordering   the left and right lobes which is indeterminate. Follow-up MR   suggested when the patient's acute symptoms have been addressed.   3. Cirrhotic appearance of the liver with steatosis.   4. Small amount of free intraperitoneal fluid with some areas of   slightly loculated fluid along the liver and right paracolic gutter,   may be postoperative.   5. Mesenteric lymphadenopathy may be reactive or metastatic.   6. Colonic diverticulosis. No evidence of diverticulitis.   7. Distended bladder, appears slightly retracted toward the right   side of the abdomen, of uncertain significance. Bilateral bladder   diverticula are noted, likely due to chronic outflow obstruction.                  Signed by: Samara Christina 6/5/2024 6:52 PM   Dictation workstation:   [id number]              ------------------------------ ED COURSE/MEDICAL DECISION MAKING----------------------  Medical Decision Making:   Exam: A medically appropriate exam performed, outlined above, given the known history and presentation.    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       CC/HPI Summary, Social Determinants of health, Records Reviewed, DDx, testing done/not done, ED Course, Reassessment, disposition considerations/shared decision making with patient, consults, disposition:   Presents to the emergency department with recurrent abdominal pain large ventral hernia continue diarrhea   Plan  Bentyl  Normal saline  CT abdomen pelvis 1.  Ventral abdominal wall hernia containing several cm of small  bowel. The bowel loops extend into and out of the hernia multiple  times and there is a transition of the bowel caliber within the  hernia. The bowel is more dilated than on the recent prior study,  suspect partial obstruction. No pneumatosis or evidence of  perforation. A separate smaller hernia right infraumbilical region,  contains a knuckle of small bowel which is more distended than  previous, could potentially be incarcerated. Surgical consultation is  suggested.  2. Status post Whipple. There is a hypodensity in the liver bordering  the left and right lobes which is indeterminate. Follow-up MR  suggested when the patient's acute symptoms have been addressed.  3. Cirrhotic appearance of the liver with steatosis.  4. Small amount of free intraperitoneal fluid with some areas of  slightly loculated fluid along the liver and right paracolic gutter,  may be postoperative.  5. Mesenteric lymphadenopathy may be reactive or metastatic.  6. Colonic diverticulosis. No evidence of diverticulitis.  7. Distended bladder, appears slightly retracted toward the right  side of the abdomen, of uncertain significance.  Bilateral bladder  diverticula are noted, likely due to chronic outflow obstruction  Lactic acid  CBC  CMP  Urine  Medical Decision Making/Differential Diagnosis:  Differentials include not limited to obstruction versus colitis versus abscess versus cellulitis  Glucose 143  Sodium 129 next potassium 3.3  Chloride 95  BUN<3  Creatinine 0.4  LFTs elevated   Alkaline phosphatase elevated.  151 6 days ago   Total bili 1.9 patient does not appear to be jaundiced improved from baseline  White blood cell count 6.6  Hemoglobin 13.6  Lactic acid 1.8  Patient presented to the emergency department with complaints of abdominal pain  Potassium low replaced in the emergency department.  Chloride and sodium slightly low.  Calcium low.  LFTs elevated magnesium normal lactic acid normal.  No elevation white blood cell count patient is not anemic urine negative for UTI CT of the abdomen pelvis showed concerns Vertebral abdominal wall hernia containing several cysts centimeters of small bowel.  The bowel loops extend into an abdominal hernia multiple times and there is transition of bowel caliber within the hernia.  The bowel is more dilated than on recent study.  Suspect partial obstruction no pneumonitis or evidence of perforation a separate small hernia right intraumbilical region contains a knuckle of the small bowel which is more distended than previous.  Patient is status post Whipple there is a hypodense in the liver borderlining the left and right lobes with an indeterminant.  Cirrhotic appearance of the liver with steatosis.  Small amount of free intraperitoneal fluid with small areas of slightly loculated fluid along the liver and right paracolic gutter.  No recent surgeries.  Mesenteric lymphadenopathy.  Colonic diverticulosis no evidence of diverticulitis and distended bladder.  Discussed case with general surgery who advised transfer due to patient having surgeries done at [facility] and concern for liver.   Discussed this with the patient.  Request to go to [place].  Patient was accepted by Dr. Angela.  [facility] with request of NG and bowel rest.  Patient was advised likely not receive a bed today however he does not want to stay in the hospital today because he has to take care of his service animal.  Patient has been advised and recommended that he stay in the hospital due to the obstruction risk of been reviewed with him including death, loss of limb organ damage or inability to perform activities of daily living patient verbalizes understanding has excepted these risks and is signing out AGAINST MEDICAL ADVICE.  Advised patient to return with any worsening symptoms or concerns patient states he will follow-up with his doctor tomorrow  Patient seen and evaluated with attending physician Dr. Ramos   PROCEDURES  Unless otherwise noted below, none      CONSULTS:   None      ED Course as of 06/06/24 0216 Wed Jun 05, 2024   1751 Patient medicated with bentyl  experiencing additional pain.  Pain medication ordered [TB]   1908 Consultation to general surgery Dr. Evans   After review CAT scan concerning due to the free fluid around the liver.  Recommend transfer secondary to patient has history of Whipple procedure and most of the surgeries were done at the [facility].  Patient was advised I would like to be transferred to [facility] services for further evaluation and treatment   [TB]   1941 Spoke with Dr. Angela.  Wait list as there are no beds available at this time.  Recommending NGT decompression and bowel rest. Very unlikely to receive a bed today [JM]      ED Course User Index  [JM] Sharon Ramos MD  [TB] Yessy Osuna, APRN-CNP         Diagnoses as of 06/06/24 0216   Hypokalemia   Small bowel obstruction (Multi)   Ventral hernia without obstruction or gangrene         This patient has remained hemodynamically stable during their ED course.      Critical Care: none         Counseling:  The emergency provider has spoken with the patient and discussed today’s results, in addition to providing specific details for the plan of care and counseling regarding the diagnosis and prognosis.  Questions are answered at this time and they are agreeable with the plan.         --------------------------------- IMPRESSION AND DISPOSITION ---------------------------------    IMPRESSION  1. Hypokalemia    2. Small bowel obstruction (Multi)    3. Ventral hernia without obstruction or gangrene        DISPOSITION  Disposition: Leaving AGAINST MEDICAL ADVICE recommend admission  Patient condition is guarded        NOTE: This report was transcribed using voice recognition software. Every effort was made to ensure accuracy; however, inadvertent computerized transcription errors may be present      KIMBERLEY Combs-NITIN  06/06/24 0216

## 2024-06-06 LAB — HOLD SPECIMEN: NORMAL

## 2024-06-06 NOTE — ED NOTES
Pt left ama and to follow up at [facility] regarding his condition and was provided education upon leaving. Pt signed ama form.      Ceci Jackman RN  06/05/24 7415

## 2024-06-06 NOTE — DISCHARGE INSTRUCTIONS
Today after evaluation was recommended that he be admitted to the hospital for further evaluation and treatment.  You have decided to sign out AGAINST MEDICAL ADVICE.  Risk of been reviewed with you in great detail including death loss of limb organ damage inability to perform activities of daily living you verbalizes understanding.  You are alert and oriented.  No signs of intoxication.  And is excepted these risks.  You have been accepted at the [facility] but have decided not to wait for this bed therefore the bed will be canceled  This does not mean you cannot return to the emergency department with any worsening symptoms or concerns.

## 2024-06-08 ENCOUNTER — APPOINTMENT (OUTPATIENT)
Dept: RADIOLOGY | Facility: HOSPITAL | Age: 54
End: 2024-06-08
Payer: COMMERCIAL

## 2024-06-08 ENCOUNTER — HOSPITAL ENCOUNTER (OUTPATIENT)
Dept: CARDIOLOGY | Facility: HOSPITAL | Age: 54
Discharge: HOME | End: 2024-06-08
Payer: COMMERCIAL

## 2024-06-08 ENCOUNTER — HOSPITAL ENCOUNTER (EMERGENCY)
Facility: HOSPITAL | Age: 54
Discharge: HOME | End: 2024-06-08
Attending: EMERGENCY MEDICINE
Payer: COMMERCIAL

## 2024-06-08 VITALS
HEART RATE: 111 BPM | OXYGEN SATURATION: 97 % | DIASTOLIC BLOOD PRESSURE: 62 MMHG | BODY MASS INDEX: 23.79 KG/M2 | HEIGHT: 68 IN | RESPIRATION RATE: 18 BRPM | WEIGHT: 157 LBS | SYSTOLIC BLOOD PRESSURE: 112 MMHG | TEMPERATURE: 98.2 F
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
## 2024-06-08 LAB
ANION GAP SERPL CALC-SCNC: 12 MMOL/L
BASOPHILS # BLD AUTO: 0.07 X10*3/UL (ref 0–0.1)
BASOPHILS NFR BLD AUTO: 1.1 %
BUN SERPL-MCNC: <3 MG/DL (ref 8–25)
CALCIUM SERPL-MCNC: 8.3 MG/DL (ref 8.5–10.4)
CHLORIDE SERPL-SCNC: 98 MMOL/L (ref 97–107)
CO2 SERPL-SCNC: 23 MMOL/L (ref 24–31)
CREAT SERPL-MCNC: 0.4 MG/DL (ref 0.4–1.6)
EGFRCR SERPLBLD CKD-EPI 2021: >90 ML/MIN/1.73M*2
EOSINOPHIL # BLD AUTO: 0.1 X10*3/UL (ref 0–0.7)
EOSINOPHIL NFR BLD AUTO: 1.5 %
ERYTHROCYTE [DISTWIDTH] IN BLOOD BY AUTOMATED COUNT: 14.9 % (ref 11.5–14.5)
GLUCOSE SERPL-MCNC: 105 MG/DL (ref 65–99)
HCT VFR BLD AUTO: 39 % (ref 41–52)
HGB BLD-MCNC: 13.1 G/DL (ref 13.5–17.5)
IMM GRANULOCYTES # BLD AUTO: 0.02 X10*3/UL (ref 0–0.7)
IMM GRANULOCYTES NFR BLD AUTO: 0.3 % (ref 0–0.9)
LYMPHOCYTES # BLD AUTO: 1.51 X10*3/UL (ref 1.2–4.8)
LYMPHOCYTES NFR BLD AUTO: 23.1 %
MCH RBC QN AUTO: 34.7 PG (ref 26–34)
MCHC RBC AUTO-ENTMCNC: 33.6 G/DL (ref 32–36)
MCV RBC AUTO: 103 FL (ref 80–100)
MONOCYTES # BLD AUTO: 0.76 X10*3/UL (ref 0.1–1)
MONOCYTES NFR BLD AUTO: 11.6 %
NEUTROPHILS # BLD AUTO: 4.09 X10*3/UL (ref 1.2–7.7)
NEUTROPHILS NFR BLD AUTO: 62.4 %
NRBC BLD-RTO: 0 /100 WBCS (ref 0–0)
PLATELET # BLD AUTO: 193 X10*3/UL (ref 150–450)
POTASSIUM SERPL-SCNC: 4.2 MMOL/L (ref 3.4–5.1)
RBC # BLD AUTO: 3.78 X10*6/UL (ref 4.5–5.9)
SODIUM SERPL-SCNC: 133 MMOL/L (ref 133–145)
WBC # BLD AUTO: 6.6 X10*3/UL (ref 4.4–11.3)

## 2024-06-08 PROCEDURE — 85025 COMPLETE CBC W/AUTO DIFF WBC: CPT | Performed by: EMERGENCY MEDICINE

## 2024-06-08 PROCEDURE — 36415 COLL VENOUS BLD VENIPUNCTURE: CPT | Performed by: EMERGENCY MEDICINE

## 2024-06-08 PROCEDURE — 93005 ELECTROCARDIOGRAM TRACING: CPT

## 2024-06-08 PROCEDURE — 71045 X-RAY EXAM CHEST 1 VIEW: CPT

## 2024-06-08 PROCEDURE — 96361 HYDRATE IV INFUSION ADD-ON: CPT | Performed by: EMERGENCY MEDICINE

## 2024-06-08 PROCEDURE — 73502 X-RAY EXAM HIP UNI 2-3 VIEWS: CPT | Mod: LT

## 2024-06-08 PROCEDURE — 99284 EMERGENCY DEPT VISIT MOD MDM: CPT | Mod: 25 | Performed by: EMERGENCY MEDICINE

## 2024-06-08 PROCEDURE — 73030 X-RAY EXAM OF SHOULDER: CPT | Mod: LT

## 2024-06-08 PROCEDURE — 73502 X-RAY EXAM HIP UNI 2-3 VIEWS: CPT | Mod: LEFT SIDE | Performed by: RADIOLOGY

## 2024-06-08 PROCEDURE — 71045 X-RAY EXAM CHEST 1 VIEW: CPT | Performed by: RADIOLOGY

## 2024-06-08 PROCEDURE — 2500000004 HC RX 250 GENERAL PHARMACY W/ HCPCS (ALT 636 FOR OP/ED): Performed by: EMERGENCY MEDICINE

## 2024-06-08 PROCEDURE — 73030 X-RAY EXAM OF SHOULDER: CPT | Mod: LEFT SIDE | Performed by: RADIOLOGY

## 2024-06-08 PROCEDURE — 96374 THER/PROPH/DIAG INJ IV PUSH: CPT | Performed by: EMERGENCY MEDICINE

## 2024-06-08 PROCEDURE — 80048 BASIC METABOLIC PNL TOTAL CA: CPT | Performed by: EMERGENCY MEDICINE

## 2024-06-08 RX ORDER — KETOROLAC TROMETHAMINE 30 MG/ML
15 INJECTION, SOLUTION INTRAMUSCULAR; INTRAVENOUS ONCE
Status: COMPLETED | OUTPATIENT
Start: 2024-06-08 | End: 2024-06-08

## 2024-06-08 RX ADMIN — SODIUM CHLORIDE 500 ML: 9 INJECTION, SOLUTION INTRAVENOUS at 01:30

## 2024-06-08 RX ADMIN — KETOROLAC TROMETHAMINE 15 MG: 30 INJECTION, SOLUTION INTRAMUSCULAR at 03:06

## 2024-06-08 ASSESSMENT — PAIN DESCRIPTION - DESCRIPTORS: DESCRIPTORS: DISCOMFORT

## 2024-06-08 ASSESSMENT — LIFESTYLE VARIABLES
TOTAL SCORE: 0
HAVE YOU EVER FELT YOU SHOULD CUT DOWN ON YOUR DRINKING: NO
EVER FELT BAD OR GUILTY ABOUT YOUR DRINKING: NO
HAVE PEOPLE ANNOYED YOU BY CRITICIZING YOUR DRINKING: NO
EVER HAD A DRINK FIRST THING IN THE MORNING TO STEADY YOUR NERVES TO GET RID OF A HANGOVER: NO

## 2024-06-08 ASSESSMENT — PAIN DESCRIPTION - LOCATION: LOCATION: HIP

## 2024-06-08 ASSESSMENT — PAIN DESCRIPTION - PROGRESSION: CLINICAL_PROGRESSION: NOT CHANGED

## 2024-06-08 ASSESSMENT — PAIN DESCRIPTION - FREQUENCY: FREQUENCY: CONSTANT/CONTINUOUS

## 2024-06-08 ASSESSMENT — PAIN - FUNCTIONAL ASSESSMENT: PAIN_FUNCTIONAL_ASSESSMENT: 0-10

## 2024-06-08 ASSESSMENT — PAIN SCALES - GENERAL
PAINLEVEL_OUTOF10: 0 - NO PAIN
PAINLEVEL_OUTOF10: 9

## 2024-06-08 ASSESSMENT — PAIN DESCRIPTION - ORIENTATION: ORIENTATION: LEFT

## 2024-06-08 NOTE — ED TRIAGE NOTES
Pt present to the ED via EMS with c/o multiple falls in the past few weeks. pt reports that he had fallen twice today. Pt moves around using a walker but pt states that the walker has not really neen of help to him. Pt reports that he is experiencing pain on his left arm and hip.

## 2024-06-08 NOTE — ED PROVIDER NOTES
HPI   Chief Complaint   Patient presents with    Fall     Pt present to the ED via EMS with c/o multiple falls in the past few weeks. pt reports that he had fallen twice today. Pt moves around using a walker but pt states that the walker has not really neen of help to him. Pt reports that he is experiencing pain on his left arm and hip.       HPI  54-year male presents from home after falling.  Patient states he falls fairly often.  Tonight he was walking with his rollator to get a drink out of the refrigerator and fell and had a difficult time getting up so EMS were called and they left and he went to get up to go to the bathroom later and fell again onto his left side and had difficult time getting up because he has pain in his left shoulder and left hip.  No trauma to his head or neck.  No dizziness or lightheadedness before or after the fall.  No vomiting.  Patient has a ventral abdominal hernia which is scheduled for surgery in 2 days.  He was seen in the emergency department on May 5 for his hernia and other issues and had extensive workup.  He is not here for that today.  No other complaints.                  Smiley Coma Scale Score: 15                     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    Epilepsy (Multi)     Hyperlipidemia     Hypertension     Tobacco use     Urethral stricture      Past Surgical History:   Procedure Laterality Date    ADENOIDECTOMY      URETHRA SURGERY      Urethral stricture repair    WHIPPLE PROCEDURE  04/22/2019    Diagnostic laparoscopy, open laparotomy for pylorus-preserving pancreaticoduodenectomy, pancreaticojejunostomy, hepaticojejunostomy, gastrojejunostomy and liver biopsy     Family History   Problem Relation Name Age of Onset    Stroke Mother      Bipolar disorder Mother      Prostate cancer Father      Hypertension Sister      Hyperlipidemia  Sister      Asthma Son      Colon cancer Paternal Grandfather       Social History     Tobacco Use    Smoking status: Every Day     Current packs/day: 0.25     Average packs/day: 0.3 packs/day for 37.4 years (9.4 ttl pk-yrs)     Types: Cigarettes     Start date: 1987    Smokeless tobacco: Not on file   Substance Use Topics    Alcohol use: Yes     Comment: He reports that he drinks alcohol occasionally    Drug use: Not on file       Physical Exam   ED Triage Vitals [06/08/24 0118]   Temperature Heart Rate Respirations BP   36.8 °C (98.2 °F) (!) 111 18 112/62      Pulse Ox Temp src Heart Rate Source Patient Position   97 % -- Monitor --      BP Location FiO2 (%)     -- --       Physical Exam  General:  Awake, alert, no acute distress.  Head: Normocephalic, Atraumatic  Neck: Supple, trachea midline, no stridor  Skin: Warm and dry, no rashes   Lungs: Clear to auscultation bilaterally no acute respiratory distress, speaking in full sentences without difficulty  CV: Regular Rate Rhythm with no obvious murmurs gallops rubs noted, no jugular venous distention, no pedal edema   Abdomen: Soft, large soft mid ventral abdominal hernia, no peritoneal signs  Neuro:  No gross focal neurologic deficits, NIH is 0  Musculoskeletal: Range of motion limited left shoulder due to pain, pelvis stable, slight pain left hip with flexion extension rotation of left lower extremities no leg length shortening or rotation.  +2/4 dorsalis pedis pulse distally  Psychiatric:  Alert oriented x 3, Good insight into condition.  ED Course & MDM   ED Course as of 06/08/24 0305   Sat Jun 08, 2024 0118 Sinus tachycardia 110 bpm normal axis MS interval 140 QTc 479 no ectopy or acute ischemic changes noted [KW]      ED Course User Index  [KW] Jef Haas DO         Diagnoses as of 06/08/24 0305   Fall, initial encounter       Medical Decision Making  Patient's x-rays are negative laboratory studies unremarkable.  He was treated with Toradol in the  emergency department.  The patient uses a rollator at home and he is prone to falling.  At this point do not feel requires admission to the hospital.  I advised him to continue with this plan for his surgery for his hernia in 2 days.  He is stable for discharge.    Procedure  Procedures     Jef Haas DO  06/08/24 0549

## 2024-06-08 NOTE — DISCHARGE INSTRUCTIONS
Thank you for choosing [facility] Emergency Department. It was my pleasure to be involved in your care today.         As of today's visit, based on reasonable likelihood, that it is safe for you to be discharged back to your residence to follow-up as an outpatient for ongoing management of your medical problem. You should follow-up with any referrals / primary provider as soon as possible. The contacts (number, addresses) are listed below.         *Return to us immediately, if you feel you are getting worse, not getting better, or any new symptoms develop.         Make sure your pharmacy and primary doctor is aware of any new medications prescribed today.          It is your responsibility to contact as soon as possible, and follow through with, any referrals you were given today. We do recommend you inform them you are a [facility] follow-up patient, as often they can better accommodate your need to be seen, provided their schedules allow. We will, and have, made every effort to ensure you have access to adequate follow-up specialists available.          All problems may not be able to be fixed in one ER visit. This is why timely ongoing care is important, and this is a responsibility you share in. Further, you are free to follow up with any provider you choose, and this is not limited to our suggestion.          If cultures were obtained today, you will be contacted should anything result that would require further treatment. Please contact the ED at the number provided with questions.          Having trouble affording medications? Try [email or web address]! (This is not a hospital endorsed website, merely a recommendation based on my own personal experiences with [employer])

## 2024-06-08 NOTE — ED NOTES
Went over discharge instructions with pt. Pt verbalized understanding. Transportation home organized. No further issues at time of discharge     Bertha Araujo RN  06/08/24 0043

## 2024-06-13 LAB
ATRIAL RATE: 110 BPM
P AXIS: 55 DEGREES
P OFFSET: 200 MS
P ONSET: 145 MS
PR INTERVAL: 140 MS
Q ONSET: 215 MS
QRS COUNT: 19 BEATS
QRS DURATION: 84 MS
QT INTERVAL: 354 MS
QTC CALCULATION(BAZETT): 479 MS
QTC FREDERICIA: 433 MS
R AXIS: 5 DEGREES
T AXIS: 41 DEGREES
T OFFSET: 392 MS
VENTRICULAR RATE: 110 BPM

## 2024-08-29 ENCOUNTER — APPOINTMENT (OUTPATIENT)
Dept: RADIOLOGY | Facility: HOSPITAL | Age: 54
End: 2024-08-29
Payer: COMMERCIAL

## 2024-08-29 ENCOUNTER — HOSPITAL ENCOUNTER (OUTPATIENT)
Dept: CARDIOLOGY | Facility: HOSPITAL | Age: 54
Discharge: HOME | End: 2024-08-29
Payer: COMMERCIAL

## 2024-08-29 ENCOUNTER — HOSPITAL ENCOUNTER (INPATIENT)
Facility: HOSPITAL | Age: 54
Discharge: HOME | End: 2024-08-29
Attending: EMERGENCY MEDICINE | Admitting: INTERNAL MEDICINE
Payer: COMMERCIAL

## 2024-08-29 DIAGNOSIS — R18.8 CIRRHOSIS OF LIVER WITH ASCITES, UNSPECIFIED HEPATIC CIRRHOSIS TYPE (MULTI): ICD-10-CM

## 2024-08-29 DIAGNOSIS — E80.6 HYPERBILIRUBINEMIA: ICD-10-CM

## 2024-08-29 DIAGNOSIS — I15.8 OTHER SECONDARY HYPERTENSION: ICD-10-CM

## 2024-08-29 DIAGNOSIS — J18.9 PNEUMONIA OF BOTH LUNGS DUE TO INFECTIOUS ORGANISM, UNSPECIFIED PART OF LUNG: Primary | ICD-10-CM

## 2024-08-29 DIAGNOSIS — R06.02 SHORTNESS OF BREATH: ICD-10-CM

## 2024-08-29 DIAGNOSIS — E83.110: ICD-10-CM

## 2024-08-29 DIAGNOSIS — E87.6 HYPOKALEMIA: ICD-10-CM

## 2024-08-29 DIAGNOSIS — J90 PLEURAL EFFUSION: ICD-10-CM

## 2024-08-29 DIAGNOSIS — R10.84 GENERALIZED ABDOMINAL PAIN: Primary | ICD-10-CM

## 2024-08-29 DIAGNOSIS — K74.60 CIRRHOSIS OF LIVER WITH ASCITES, UNSPECIFIED HEPATIC CIRRHOSIS TYPE (MULTI): ICD-10-CM

## 2024-08-29 DIAGNOSIS — I27.20 PULMONARY HTN (MULTI): ICD-10-CM

## 2024-08-29 DIAGNOSIS — R74.01 TRANSAMINITIS: ICD-10-CM

## 2024-08-29 LAB
ALBUMIN SERPL-MCNC: 2.2 G/DL (ref 3.5–5)
ALP BLD-CCNC: 179 U/L (ref 35–125)
ALT SERPL-CCNC: 22 U/L (ref 5–40)
ANION GAP SERPL CALC-SCNC: 11 MMOL/L
AST SERPL-CCNC: 111 U/L (ref 5–40)
BASOPHILS # BLD AUTO: 0.05 X10*3/UL (ref 0–0.1)
BASOPHILS NFR BLD AUTO: 0.8 %
BILIRUB SERPL-MCNC: 6.4 MG/DL (ref 0.1–1.2)
BUN SERPL-MCNC: <3 MG/DL (ref 8–25)
CALCIUM SERPL-MCNC: 7.9 MG/DL (ref 8.5–10.4)
CHLORIDE SERPL-SCNC: 95 MMOL/L (ref 97–107)
CO2 SERPL-SCNC: 26 MMOL/L (ref 24–31)
CREAT SERPL-MCNC: 0.4 MG/DL (ref 0.4–1.6)
EGFRCR SERPLBLD CKD-EPI 2021: >90 ML/MIN/1.73M*2
EOSINOPHIL # BLD AUTO: 0.06 X10*3/UL (ref 0–0.7)
EOSINOPHIL NFR BLD AUTO: 1 %
ERYTHROCYTE [DISTWIDTH] IN BLOOD BY AUTOMATED COUNT: 19.3 % (ref 11.5–14.5)
GLUCOSE SERPL-MCNC: 123 MG/DL (ref 65–99)
HCT VFR BLD AUTO: 32.2 % (ref 41–52)
HGB BLD-MCNC: 11.3 G/DL (ref 13.5–17.5)
IMM GRANULOCYTES # BLD AUTO: 0.04 X10*3/UL (ref 0–0.7)
IMM GRANULOCYTES NFR BLD AUTO: 0.7 % (ref 0–0.9)
LIPASE SERPL-CCNC: <16 U/L (ref 16–63)
LYMPHOCYTES # BLD AUTO: 1.15 X10*3/UL (ref 1.2–4.8)
LYMPHOCYTES NFR BLD AUTO: 19.4 %
MCH RBC QN AUTO: 35.5 PG (ref 26–34)
MCHC RBC AUTO-ENTMCNC: 35.1 G/DL (ref 32–36)
MCV RBC AUTO: 101 FL (ref 80–100)
MONOCYTES # BLD AUTO: 0.51 X10*3/UL (ref 0.1–1)
MONOCYTES NFR BLD AUTO: 8.6 %
NEUTROPHILS # BLD AUTO: 4.13 X10*3/UL (ref 1.2–7.7)
NEUTROPHILS NFR BLD AUTO: 69.5 %
NRBC BLD-RTO: 0 /100 WBCS (ref 0–0)
PLATELET # BLD AUTO: 113 X10*3/UL (ref 150–450)
POTASSIUM SERPL-SCNC: 3 MMOL/L (ref 3.4–5.1)
PROT SERPL-MCNC: 6.9 G/DL (ref 5.9–7.9)
RBC # BLD AUTO: 3.18 X10*6/UL (ref 4.5–5.9)
SODIUM SERPL-SCNC: 132 MMOL/L (ref 133–145)
TROPONIN T SERPL-MCNC: 10 NG/L
TROPONIN T SERPL-MCNC: 12 NG/L
WBC # BLD AUTO: 5.9 X10*3/UL (ref 4.4–11.3)

## 2024-08-29 PROCEDURE — 93010 ELECTROCARDIOGRAM REPORT: CPT | Performed by: INTERNAL MEDICINE

## 2024-08-29 PROCEDURE — 85025 COMPLETE CBC W/AUTO DIFF WBC: CPT | Performed by: EMERGENCY MEDICINE

## 2024-08-29 PROCEDURE — 36415 COLL VENOUS BLD VENIPUNCTURE: CPT | Performed by: EMERGENCY MEDICINE

## 2024-08-29 PROCEDURE — 71045 X-RAY EXAM CHEST 1 VIEW: CPT | Performed by: RADIOLOGY

## 2024-08-29 PROCEDURE — 1200000002 HC GENERAL ROOM WITH TELEMETRY DAILY

## 2024-08-29 PROCEDURE — 36415 COLL VENOUS BLD VENIPUNCTURE: CPT | Performed by: PHYSICIAN ASSISTANT

## 2024-08-29 PROCEDURE — 83690 ASSAY OF LIPASE: CPT | Performed by: PHYSICIAN ASSISTANT

## 2024-08-29 PROCEDURE — 99223 1ST HOSP IP/OBS HIGH 75: CPT | Performed by: NURSE PRACTITIONER

## 2024-08-29 PROCEDURE — 71045 X-RAY EXAM CHEST 1 VIEW: CPT

## 2024-08-29 PROCEDURE — 74176 CT ABD & PELVIS W/O CONTRAST: CPT

## 2024-08-29 PROCEDURE — 93005 ELECTROCARDIOGRAM TRACING: CPT

## 2024-08-29 PROCEDURE — 74176 CT ABD & PELVIS W/O CONTRAST: CPT | Performed by: RADIOLOGY

## 2024-08-29 PROCEDURE — 2500000004 HC RX 250 GENERAL PHARMACY W/ HCPCS (ALT 636 FOR OP/ED): Performed by: PHYSICIAN ASSISTANT

## 2024-08-29 PROCEDURE — 84075 ASSAY ALKALINE PHOSPHATASE: CPT | Performed by: EMERGENCY MEDICINE

## 2024-08-29 PROCEDURE — 96374 THER/PROPH/DIAG INJ IV PUSH: CPT | Mod: 59

## 2024-08-29 PROCEDURE — 84484 ASSAY OF TROPONIN QUANT: CPT | Performed by: EMERGENCY MEDICINE

## 2024-08-29 PROCEDURE — 96375 TX/PRO/DX INJ NEW DRUG ADDON: CPT

## 2024-08-29 PROCEDURE — 2500000001 HC RX 250 WO HCPCS SELF ADMINISTERED DRUGS (ALT 637 FOR MEDICARE OP): Performed by: NURSE PRACTITIONER

## 2024-08-29 PROCEDURE — 99285 EMERGENCY DEPT VISIT HI MDM: CPT | Mod: 25

## 2024-08-29 PROCEDURE — 2500000004 HC RX 250 GENERAL PHARMACY W/ HCPCS (ALT 636 FOR OP/ED): Performed by: NURSE PRACTITIONER

## 2024-08-29 RX ORDER — MORPHINE SULFATE 2 MG/ML
2 INJECTION, SOLUTION INTRAMUSCULAR; INTRAVENOUS ONCE
Status: COMPLETED | OUTPATIENT
Start: 2024-08-29 | End: 2024-08-29

## 2024-08-29 RX ORDER — ALPRAZOLAM 0.5 MG/1
1 TABLET ORAL 2 TIMES DAILY PRN
Status: DISPENSED | OUTPATIENT
Start: 2024-08-29

## 2024-08-29 RX ORDER — ENOXAPARIN SODIUM 100 MG/ML
40 INJECTION SUBCUTANEOUS DAILY
Status: DISPENSED | OUTPATIENT
Start: 2024-08-30

## 2024-08-29 RX ORDER — ASPIRIN 81 MG/1
81 TABLET ORAL DAILY
Status: DISPENSED | OUTPATIENT
Start: 2024-08-29

## 2024-08-29 RX ORDER — ONDANSETRON 4 MG/1
4 TABLET, FILM COATED ORAL EVERY 8 HOURS PRN
Status: ACTIVE | OUTPATIENT
Start: 2024-08-29

## 2024-08-29 RX ORDER — ONDANSETRON HYDROCHLORIDE 2 MG/ML
4 INJECTION, SOLUTION INTRAVENOUS EVERY 8 HOURS PRN
Status: DISPENSED | OUTPATIENT
Start: 2024-08-29

## 2024-08-29 RX ORDER — ONDANSETRON HYDROCHLORIDE 2 MG/ML
4 INJECTION, SOLUTION INTRAVENOUS ONCE
Status: COMPLETED | OUTPATIENT
Start: 2024-08-29 | End: 2024-08-29

## 2024-08-29 RX ORDER — POTASSIUM CHLORIDE 14.9 MG/ML
20 INJECTION INTRAVENOUS
Status: COMPLETED | OUTPATIENT
Start: 2024-08-29 | End: 2024-08-29

## 2024-08-29 RX ORDER — METOPROLOL TARTRATE 25 MG/1
25 TABLET, FILM COATED ORAL 2 TIMES DAILY
Status: DISPENSED | OUTPATIENT
Start: 2024-08-29

## 2024-08-29 RX ORDER — POLYETHYLENE GLYCOL 3350 17 G/17G
17 POWDER, FOR SOLUTION ORAL DAILY PRN
Status: ACTIVE | OUTPATIENT
Start: 2024-08-29

## 2024-08-29 RX ORDER — MORPHINE SULFATE 2 MG/ML
2 INJECTION, SOLUTION INTRAMUSCULAR; INTRAVENOUS EVERY 4 HOURS PRN
Status: DISCONTINUED | OUTPATIENT
Start: 2024-08-29 | End: 2024-08-30

## 2024-08-29 SDOH — SOCIAL STABILITY: SOCIAL INSECURITY: DOES ANYONE TRY TO KEEP YOU FROM HAVING/CONTACTING OTHER FRIENDS OR DOING THINGS OUTSIDE YOUR HOME?: NO

## 2024-08-29 SDOH — SOCIAL STABILITY: SOCIAL INSECURITY: HAVE YOU HAD THOUGHTS OF HARMING ANYONE ELSE?: NO

## 2024-08-29 SDOH — SOCIAL STABILITY: SOCIAL INSECURITY: HAVE YOU HAD ANY THOUGHTS OF HARMING ANYONE ELSE?: NO

## 2024-08-29 SDOH — SOCIAL STABILITY: SOCIAL INSECURITY: ABUSE: ADULT

## 2024-08-29 SDOH — SOCIAL STABILITY: SOCIAL INSECURITY: DO YOU FEEL UNSAFE GOING BACK TO THE PLACE WHERE YOU ARE LIVING?: NO

## 2024-08-29 SDOH — SOCIAL STABILITY: SOCIAL INSECURITY: ARE YOU OR HAVE YOU BEEN THREATENED OR ABUSED PHYSICALLY, EMOTIONALLY, OR SEXUALLY BY ANYONE?: NO

## 2024-08-29 SDOH — SOCIAL STABILITY: SOCIAL INSECURITY: ARE THERE ANY APPARENT SIGNS OF INJURIES/BEHAVIORS THAT COULD BE RELATED TO ABUSE/NEGLECT?: NO

## 2024-08-29 SDOH — SOCIAL STABILITY: SOCIAL INSECURITY: WERE YOU ABLE TO COMPLETE ALL THE BEHAVIORAL HEALTH SCREENINGS?: YES

## 2024-08-29 SDOH — SOCIAL STABILITY: SOCIAL INSECURITY: HAS ANYONE EVER THREATENED TO HURT YOUR FAMILY OR YOUR PETS?: NO

## 2024-08-29 SDOH — SOCIAL STABILITY: SOCIAL INSECURITY: DO YOU FEEL ANYONE HAS EXPLOITED OR TAKEN ADVANTAGE OF YOU FINANCIALLY OR OF YOUR PERSONAL PROPERTY?: NO

## 2024-08-29 ASSESSMENT — LIFESTYLE VARIABLES
SKIP TO QUESTIONS 9-10: 1
PRESCIPTION_ABUSE_PAST_12_MONTHS: NO
AUDIT-C TOTAL SCORE: 0
SUBSTANCE_ABUSE_PAST_12_MONTHS: NO
HOW OFTEN DO YOU HAVE A DRINK CONTAINING ALCOHOL: NEVER
AUDIT-C TOTAL SCORE: 0
HOW MANY STANDARD DRINKS CONTAINING ALCOHOL DO YOU HAVE ON A TYPICAL DAY: PATIENT DOES NOT DRINK
HOW OFTEN DO YOU HAVE 6 OR MORE DRINKS ON ONE OCCASION: NEVER

## 2024-08-29 ASSESSMENT — COGNITIVE AND FUNCTIONAL STATUS - GENERAL
MOBILITY SCORE: 16
TURNING FROM BACK TO SIDE WHILE IN FLAT BAD: A LITTLE
TOILETING: A LITTLE
PERSONAL GROOMING: A LITTLE
CLIMB 3 TO 5 STEPS WITH RAILING: A LOT
DAILY ACTIVITIY SCORE: 18
MOVING TO AND FROM BED TO CHAIR: A LITTLE
PERSONAL GROOMING: A LITTLE
CLIMB 3 TO 5 STEPS WITH RAILING: A LOT
MOVING TO AND FROM BED TO CHAIR: A LITTLE
DAILY ACTIVITIY SCORE: 18
STANDING UP FROM CHAIR USING ARMS: A LITTLE
DRESSING REGULAR UPPER BODY CLOTHING: A LITTLE
WALKING IN HOSPITAL ROOM: A LOT
HELP NEEDED FOR BATHING: A LOT
TOILETING: A LITTLE
HELP NEEDED FOR BATHING: A LOT
MOBILITY SCORE: 16
HELP NEEDED FOR BATHING: A LOT
TURNING FROM BACK TO SIDE WHILE IN FLAT BAD: A LITTLE
MOVING FROM LYING ON BACK TO SITTING ON SIDE OF FLAT BED WITH BEDRAILS: A LITTLE
TURNING FROM BACK TO SIDE WHILE IN FLAT BAD: A LITTLE
STANDING UP FROM CHAIR USING ARMS: A LITTLE
MOVING TO AND FROM BED TO CHAIR: A LITTLE
MOVING FROM LYING ON BACK TO SITTING ON SIDE OF FLAT BED WITH BEDRAILS: A LITTLE
DRESSING REGULAR LOWER BODY CLOTHING: A LITTLE
WALKING IN HOSPITAL ROOM: A LOT
CLIMB 3 TO 5 STEPS WITH RAILING: A LOT
DRESSING REGULAR LOWER BODY CLOTHING: A LITTLE
WALKING IN HOSPITAL ROOM: A LOT
PERSONAL GROOMING: A LITTLE
DRESSING REGULAR UPPER BODY CLOTHING: A LITTLE
MOBILITY SCORE: 16
MOVING FROM LYING ON BACK TO SITTING ON SIDE OF FLAT BED WITH BEDRAILS: A LITTLE
STANDING UP FROM CHAIR USING ARMS: A LITTLE
DAILY ACTIVITIY SCORE: 18
DRESSING REGULAR LOWER BODY CLOTHING: A LITTLE
TOILETING: A LITTLE
DRESSING REGULAR UPPER BODY CLOTHING: A LITTLE
PATIENT BASELINE BEDBOUND: NO

## 2024-08-29 ASSESSMENT — PAIN DESCRIPTION - PAIN TYPE: TYPE: ACUTE PAIN

## 2024-08-29 ASSESSMENT — PAIN DESCRIPTION - LOCATION
LOCATION: ABDOMEN
LOCATION: ABDOMEN

## 2024-08-29 ASSESSMENT — ACTIVITIES OF DAILY LIVING (ADL)
HEARING - LEFT EAR: FUNCTIONAL
FEEDING YOURSELF: INDEPENDENT
PATIENT'S MEMORY ADEQUATE TO SAFELY COMPLETE DAILY ACTIVITIES?: YES
JUDGMENT_ADEQUATE_SAFELY_COMPLETE_DAILY_ACTIVITIES: YES
HEARING - RIGHT EAR: FUNCTIONAL
GROOMING: NEEDS ASSISTANCE
ADEQUATE_TO_COMPLETE_ADL: YES
BATHING: NEEDS ASSISTANCE
WALKS IN HOME: NEEDS ASSISTANCE
TOILETING: NEEDS ASSISTANCE
LACK_OF_TRANSPORTATION: NO
DRESSING YOURSELF: NEEDS ASSISTANCE

## 2024-08-29 ASSESSMENT — COLUMBIA-SUICIDE SEVERITY RATING SCALE - C-SSRS
2. HAVE YOU ACTUALLY HAD ANY THOUGHTS OF KILLING YOURSELF?: NO
1. IN THE PAST MONTH, HAVE YOU WISHED YOU WERE DEAD OR WISHED YOU COULD GO TO SLEEP AND NOT WAKE UP?: NO
6. HAVE YOU EVER DONE ANYTHING, STARTED TO DO ANYTHING, OR PREPARED TO DO ANYTHING TO END YOUR LIFE?: NO

## 2024-08-29 ASSESSMENT — PAIN DESCRIPTION - DESCRIPTORS
DESCRIPTORS: STABBING
DESCRIPTORS: CRAMPING;SHARP

## 2024-08-29 ASSESSMENT — ENCOUNTER SYMPTOMS
ABDOMINAL PAIN: 1
MUSCULOSKELETAL NEGATIVE: 1
ALLERGIC/IMMUNOLOGIC NEGATIVE: 1
ABDOMINAL DISTENTION: 1
PSYCHIATRIC NEGATIVE: 1
COUGH: 1
HEMATOLOGIC/LYMPHATIC NEGATIVE: 1
FATIGUE: 1
EYES NEGATIVE: 1
DIZZINESS: 1
DIARRHEA: 1
APPETITE CHANGE: 1
ENDOCRINE NEGATIVE: 1
SHORTNESS OF BREATH: 1

## 2024-08-29 ASSESSMENT — PAIN SCALES - GENERAL
PAINLEVEL_OUTOF10: 5 - MODERATE PAIN
PAINLEVEL_OUTOF10: 8
PAINLEVEL_OUTOF10: 9
PAINLEVEL_OUTOF10: 8

## 2024-08-29 ASSESSMENT — PAIN - FUNCTIONAL ASSESSMENT
PAIN_FUNCTIONAL_ASSESSMENT: WONG-BAKER FACES
PAIN_FUNCTIONAL_ASSESSMENT: 0-10

## 2024-08-29 ASSESSMENT — PATIENT HEALTH QUESTIONNAIRE - PHQ9
1. LITTLE INTEREST OR PLEASURE IN DOING THINGS: MORE THAN HALF THE DAYS
SUM OF ALL RESPONSES TO PHQ9 QUESTIONS 1 & 2: 4
2. FEELING DOWN, DEPRESSED OR HOPELESS: MORE THAN HALF THE DAYS

## 2024-08-29 ASSESSMENT — PAIN SCALES - WONG BAKER: WONGBAKER_NUMERICALRESPONSE: NO HURT

## 2024-08-29 ASSESSMENT — PAIN DESCRIPTION - PROGRESSION: CLINICAL_PROGRESSION: NOT CHANGED

## 2024-08-29 NOTE — NURSING NOTE
Assumed patient care patient is lying in bed. Asking for something to eat and drink. Explained to patient that he is NPO for now. No complaints at the moment.

## 2024-08-29 NOTE — ED PROVIDER NOTES
HPI   Chief Complaint   Patient presents with    Abdominal Pain     Patient presents to the ED with abdominal pain. Been having diarrhea for over 1 week. Has not been eating.       HPI  This is a 54-year-old male present to the emergency room with complaints of abdominal pain and decreased appetite.  Patient states that he has been having a decrease in appetite recently but no nausea or vomiting.  He has also been having diarrhea over the last week.  He states has been dark.  He does have a history of abdominal hernia.  He has chronic abdominal pain.  No specific worsening today.  He did have a paracentesis in June 2024.  He states that he has been told before that his hernia has been infected.  Patient states his home health aide wanted him to come to the emergency department because they have been concerned about his decrease in appetite and diarrhea.  Trauma or recent travel.  Patient denies any chest pain, palpitation, sweating, or urinary complaints.    Please see HPI for pertinent positive and negative ROS.     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    Epilepsy (Multi)     Hyperlipidemia     Hypertension     Tobacco use     Urethral stricture      Past Surgical History:   Procedure Laterality Date    ADENOIDECTOMY      URETHRA SURGERY      Urethral stricture repair    WHIPPLE PROCEDURE  04/22/2019    Diagnostic laparoscopy, open laparotomy for pylorus-preserving pancreaticoduodenectomy, pancreaticojejunostomy, hepaticojejunostomy, gastrojejunostomy and liver biopsy     Family History   Problem Relation Name Age of Onset    Stroke Mother      Bipolar disorder Mother      Prostate cancer Father      Hypertension Sister      Hyperlipidemia Sister      Asthma Son      Colon cancer Paternal Grandfather       Social History     Tobacco Use    Smoking status: Every Day     Current  packs/day: 0.25     Average packs/day: 0.3 packs/day for 37.7 years (9.4 ttl pk-yrs)     Types: Cigarettes     Start date: 1987    Smokeless tobacco: Not on file   Substance Use Topics    Alcohol use: Yes     Comment: He reports that he drinks alcohol occasionally    Drug use: Not on file       Physical Exam   ED Triage Vitals   Temperature Heart Rate Respirations BP   08/29/24 1424 08/29/24 1420 08/29/24 1420 08/29/24 1420   36.9 °C (98.4 °F) 100 18 104/65      Pulse Ox Temp src Heart Rate Source Patient Position   08/29/24 1420 -- -- 08/29/24 1420   96 %   Lying      BP Location FiO2 (%)     08/29/24 1420 --     Left arm        Physical Exam  GENERAL APPEARANCE: Awake and alert. No acute respiratory distress.   VITAL SIGNS: As per the nurses' triage record.  HEENT: Normocephalic, atraumatic.   NECK: Soft, nontender and supple  CHEST: Nontender to palpation. Clear to auscultation bilaterally. Symmetric rise and fall of chest wall.   HEART: Clear S1 and S2. Regular rate and rhythm.  Strong and equal pulses in the extremities.  ABDOMEN: Soft, hernia is present in the middle of the abdomen, tenderness to palpation, distended abdomen  MUSCULOSKELETAL: Full gross active range of motion. NEUROLOGICAL: Awake, alert and oriented x 3. Motor power intact in the upper and lower extremities. Sensation is intact to light touch in the upper and lower extremities. Patient answering questions appropriately.   IMMUNOLOGICAL: No lymphatic streaking noted  DERMATOLOGIC: Warm and dry.  There is protrusion of the bowel wall through his abdomen.  Looks like a ventral hernia.  No surrounding skin erythema, warmth or purulent drainage.    PYSCH: Cooperative with appropriate mood and affect.    ED Course & MDM   Diagnoses as of 08/29/24 1728   Generalized abdominal pain   Hyperbilirubinemia   Transaminitis   Cirrhosis of liver with ascites, unspecified hepatic cirrhosis type (Multi)   Pleural effusion   Hypokalemia                 No data  recorded     Sargent Coma Scale Score: 15 (08/29/24 1424 : Jo Ann Quiñones RN)                           Medical Decision Making  Parts of this chart have been completed using voice recognition software. Please excuse any errors of transcription.  My thought process and reason for plan has been formulated from the time that I saw the patient until the time of disposition and is not specific to one specific moment during their visit and furthermore my MDM encompasses this entire chart and not only this text box.      HPI: Detailed above.    Exam: A medically appropriate exam performed, outlined above, given the known history and presentation.    History obtained from: Patient    EKG: See my supervising physicians EKG interpretation    Medications given during visit:  Medications   potassium chloride 20 mEq in sterile water for injection 100 mL (20 mEq intravenous New Bag 8/29/24 1640)   sodium chloride 0.9 % bolus 1,000 mL (1,000 mL intravenous New Bag 8/29/24 1540)   ondansetron (Zofran) injection 4 mg (4 mg intravenous Given 8/29/24 1540)   morphine injection 2 mg (2 mg intravenous Given 8/29/24 1540)        Diagnostic/tests  Labs Reviewed   CBC WITH AUTO DIFFERENTIAL - Abnormal       Result Value    WBC 5.9      nRBC 0.0      RBC 3.18 (*)     Hemoglobin 11.3 (*)     Hematocrit 32.2 (*)      (*)     MCH 35.5 (*)     MCHC 35.1      RDW 19.3 (*)     Platelets 113 (*)     Neutrophils % 69.5      Immature Granulocytes %, Automated 0.7      Lymphocytes % 19.4      Monocytes % 8.6      Eosinophils % 1.0      Basophils % 0.8      Neutrophils Absolute 4.13      Immature Granulocytes Absolute, Automated 0.04      Lymphocytes Absolute 1.15 (*)     Monocytes Absolute 0.51      Eosinophils Absolute 0.06      Basophils Absolute 0.05     COMPREHENSIVE METABOLIC PANEL - Abnormal    Glucose 123 (*)     Sodium 132 (*)     Potassium 3.0 (*)     Chloride 95 (*)     Bicarbonate 26      Urea Nitrogen <3 (*)     Creatinine 0.40       eGFR >90      Calcium 7.9 (*)     Albumin 2.2 (*)     Alkaline Phosphatase 179 (*)     Total Protein 6.9       (*)     Bilirubin, Total 6.4 (*)     ALT 22      Anion Gap 11     LIPASE - Abnormal    Lipase <16 (*)    TROPONIN T, HIGH SENSITIVITY - Normal    Troponin T, High Sensitivity 10     TROPONIN T, HIGH SENSITIVITY - Normal    Troponin T, High Sensitivity 12     URINALYSIS WITH REFLEX CULTURE AND MICROSCOPIC    Narrative:     The following orders were created for panel order Urinalysis with Reflex Culture and Microscopic.  Procedure                               Abnormality         Status                     ---------                               -----------         ------                     Urinalysis with Reflex C...[[id number]]                                                 Extra Urine Gray Tube[[id number]]                                                         Please view results for these tests on the individual orders.   URINALYSIS WITH REFLEX CULTURE AND MICROSCOPIC   EXTRA URINE GRAY TUBE      CT abdomen pelvis wo IV contrast   Final Result   1. Interval significant worsening of large left pleural effusion left   basilar infiltrates/atelectasis; correlate clinically and follow-up   as needed.   2. Heterogenous cirrhotic liver with pneumobilia and stable hypodense   lesion in the left lobe. Splenomegaly. Worsening ascites in the   abdomen and pelvis.   3.  previous Whipple's procedure. Associated prominent   peripancreatic/periportal lymph nodes.   4. Large ventral hernia again seen, containing several small bowel   loops, without strangulation or bowel obstruction. Colonic   diverticulosis without acute diverticulitis.        Signed by: Tamir Luevano 8/29/2024 4:15 PM   Dictation workstation:   [id number]      XR chest 1 view    (Results Pending)        Considerations/further MDM:  Patient was seen in conjucntion with my supervising physician,  Dr. Delarosa. Please refer to her  note.    Blood pressure 104/65, temperature 98.4 °F, heart rate 100 bpm, respirations 18, 96% on room air    CMP does show electrolyte disturbances.  Potassium is 3.  Elevation in total bilirubin at 6.4 with an elevation in AST at 111.  Lipase less than 16.  Initial troponin T12.  CBC shows no leukocytosis.  There is a macrocytic anemia.  CT of abdomen pelvis without IV contrast shows worsening large left pleural effusion.  He does have cirrhotic liver.  Worsening ascites of the abdomen pelvis is seen.  The ventral hernia is noted containing several small bowel loops but no strangulation or bowel obstruction.    Chest x-ray is pending at time of admission.    Patient was treated with IV potassium due to hypokalemia, IV normal saline, IV morphine and IV Zofran with improvement in his symptoms.  He will be admitted for large left-sided pleural effusion, worsening abdominal ascites, and electrolyte disturbances.    The patient was evaluated in the emergency department and an etiology requiring emergent treatment or admission to hospital was identified.  The patient/family was counseled on clinical expression, expectations, and plan along with recommendations for admission.  All questions were answered and involved parties were understanding and agreeable to course of treatment.  Case was discussed with admitting physician, Dr. Brunson.  Bed type ED treatment and further ED work-up decided by joint decision making with admitting team and any consultants.  Patient stable for admission per my assessment and further management of patient will be deferred to the inpatient setting.    Procedure  Procedures     Mily Sutherland PA-C  08/29/24 4974

## 2024-08-29 NOTE — NURSING NOTE
Pt arrived to floor. A&O x 4. Jaundice in color, yellow tinted eyes.   States left sided stroke hx, left leg weakness. Sob upon exertion and laying flat. Dry cough present, stated to have quit smoking 3 weeks ago. Ventral hernia present with opening. Covered with dressing.  Oriented to room, call light, tv remote.  Stating to have appetite back with no n/v at this time.

## 2024-08-29 NOTE — Clinical Note
1.3 L of yellow fluid removed in total. Catheter removed and small bandaid applied to left lower back.

## 2024-08-29 NOTE — Clinical Note
1.7 L of turner fluid obtained from left chest. Catheter removed and small bandaid to left lower back.

## 2024-08-29 NOTE — PROGRESS NOTES
Attestation/Supervisory note for TRUDI Sutherland      The patient is a 54-year-old male presenting to the emergency department for evaluation of abdominal pain and decreased appetite.  He states this is a been an ongoing issue for many months.  He states that his symptoms have been slightly worse over the past week.  He states that he does have a home health care nurse who comes to check on him.  He states that they did contact his doctor but his doctor would would not see him in the office and told him he would need to come to the emergency room.  He states he has been seen for similar symptoms in the past.  He states he was in this emergency room in June 2024.  He states he had to be transferred to [facility].  He states that he did have paracentesis done at that time.  No recent injury or trauma.  He states he does have a known hernia that has been present for many years.  He states that in the past he has been told that his hernia had gotten infected.  He denies any headache or visual changes.  No chest pain or shortness of breath.  No back pain.  No urinary complaints.  No urethral discharge.  No fever or chills.  No cough or congestion.  No palpitations.  No diaphoresis.  He states the abdominal pain is diffuse.  No better or worse.  No radiation.  It is fairly constant.  He states that he does have some intermittent diarrhea over the past week.  He denies any recent antibiotic use.  No sick contacts or recent travel.  He states he has not had any vomiting but he just does not have an appetite and does not want to eat.  He states that this has been worsening over the past 4 months but worse again in the past week or so.  All pertinent positives and negatives are recorded above.  All other systems reviewed and otherwise negative.  Vital signs within normal limits.  Physical exam with a well-nourished well-developed male with disheveled appearance but no evidence of acute distress.  HEENT exam with dry  mucous membranes but otherwise within normal limits.  He has no evidence of airway compromise or respiratory distress.  He does have diffuse abdominal tenderness to palpation.  He does have a palpable umbilical hernia.  No rebound or guarding.  No flank pain with percussion or palpation.  He does not have any gross motor, neurologic or vascular deficits on exam.  Pulses are equal bilaterally.      EKG with normal sinus rhythm at 98 bpm, low voltage, borderline prolongation of the QT interval, normal ST segment, normal T waves.      IV morphine, IV Zofran and IV fluids ordered.      Diagnostic labs with electrolyte imbalance with hypokalemia, transaminitis, hyperbilirubinemia, anemia, mild thrombocytopenia but otherwise unremarkable.      Initial Troponin T 10.  Repeat Troponin T pending at the time of admit      UA pending at the time of admission      Oral KCl ordered      CT abdomen pelvis wo IV contrast   Final Result   1. Interval significant worsening of large left pleural effusion left   basilar infiltrates/atelectasis; correlate clinically and follow-up   as needed.   2. Heterogenous cirrhotic liver with pneumobilia and stable hypodense   lesion in the left lobe. Splenomegaly. Worsening ascites in the   abdomen and pelvis.   3.  previous Whipple's procedure. Associated prominent   peripancreatic/periportal lymph nodes.   4. Large ventral hernia again seen, containing several small bowel   loops, without strangulation or bowel obstruction. Colonic   diverticulosis without acute diverticulitis.        Signed by: Tamir Luevano 8/29/2024 4:15 PM   Dictation workstation:   [id number]           The patient does not have any evidence of ischemia on EKG or cardiac enzymes.  No events on telemetry.  He does not have any evidence of airway compromise or respiratory distress but does have a large pleural effusion on CT imaging. The patient does have evidence of electrolyte imbalance with hypokalemia and this was  repleted orally.  He also has transaminitis and hyperbilirubinemia.  The patient reports that he is not able to see his primary care physician because they will only send him to the emergency room.  He is uncomfortable with plan to discharge him to home with outpatient follow-up because of this.  I did speak to the admitting provider, Dr. Brunson, and she agreed to admit the patient for further management and GI consultation.  Chest x-ray was ordered and was in process at the time of admission for further evaluation of the pleural effusion seen on CT imaging.        Impression/diagnosis:  Abdominal pain, acute on chronic  Anorexia  3.  Electrolyte imbalance with hypokalemia  4.  Thrombocytopenia  5.  Transaminitis  6.  Hyperbilirubinemia  7.   Left pleural effusion  8.  Ventral hernia without evidence of strangulation or bowel obstruction.  9.  Chronic diverticulosis without evidence of acute diverticulitis      I personally saw the patient and made/approve the management plan and take responsibility for the patient management.      I independently interpreted the following study (S) EKG and diagnostic labs      I personally discussed the patient's management with the patient      I reviewed the results of the diagnostic labs and diagnostic imaging.  Formal radiology read was completed by the radiologist.      Anayeli Delarosa MD

## 2024-08-29 NOTE — H&P
Chief complaint: Abdominal pain     History Of Present Illness  Nils Hogan is a 54 y.o. 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 Patient presented to the ED today due to right upper quadrant abdominal pain, weakness, and lack of appetite for about a week. Patient also makes note that he was having some dark loose stool. Patient states that he also has some shortness of breath on exertion, and improvement with rest. States that he has intermittent episodes of dizziness as well. Patient denies any nausea or vomiting. Denies any chest pain.    In the ED work up revealed a blood glucose of 123, sodium 132, potassium 3.0, creatinine 0.40, GFR >90, ALT 22, , lipase <16, WBC 5.9, hemoglobin 11.3, hematocrit 32.3, platelets 113. CT of abdomen revealed Interval significant worsening of large left pleural effusion left basilar infiltrates/atelectasis; correlate clinically and follow-up  as needed. Heterogenous cirrhotic liver with pneumobilia and stable hypodense  lesion in the left lobe. Splenomegaly. Worsening ascites in the  abdomen and pelvis. previous Whipple's procedure. Associated prominent  peripancreatic/periportal lymph nodes. Large ventral hernia again seen, containing several small bowel loops, without strangulation or bowel obstruction. Colonic  diverticulosis without acute diverticulitis. Pending urinalysis.  Chest xray revealed Large left-sided pleural effusion.  In the ED the patient was given IV potassium, morphine, and zofran with a 1L fluid bolus.       Past Medical History  Past Medical History:   Diagnosis Date    Bipolar I disorder (Multi)     CVA (cerebral vascular accident) (Multi)     Duodenal  cancer (Multi) 03/2019    Epilepsy (Multi)     Hyperlipidemia     Hypertension     Tobacco use     Urethral stricture        Surgical History  Past Surgical History:   Procedure Laterality Date    ADENOIDECTOMY      URETHRA SURGERY      Urethral stricture repair    WHIPPLE PROCEDURE  04/22/2019    Diagnostic laparoscopy, open laparotomy for pylorus-preserving pancreaticoduodenectomy, pancreaticojejunostomy, hepaticojejunostomy, gastrojejunostomy and liver biopsy        Social History  He reports that he quit smoking about 4 weeks ago. His smoking use included cigarettes. He started smoking about 37 years ago. He has a 9.4 pack-year smoking history. He does not have any smokeless tobacco history on file. He reports current alcohol use. No history on file for drug use.    Family History  Family History   Problem Relation Name Age of Onset    Stroke Mother      Bipolar disorder Mother      Prostate cancer Father      Hypertension Sister      Hyperlipidemia Sister      Asthma Son      Colon cancer Paternal Grandfather          Allergies  Diazepam, Iodinated contrast media, Iodine, Zosyn [piperacillin-tazobactam], Acetaminophen, and Phenytoin sodium extended    Review of Systems   Constitutional:  Positive for appetite change and fatigue.   HENT: Negative.     Eyes: Negative.    Respiratory:  Positive for cough and shortness of breath.    Cardiovascular:  Positive for leg swelling.   Gastrointestinal:  Positive for abdominal distention, abdominal pain and diarrhea.   Endocrine: Negative.    Genitourinary: Negative.    Musculoskeletal: Negative.    Skin: Negative.    Allergic/Immunologic: Negative.    Neurological:  Positive for dizziness.   Hematological: Negative.    Psychiatric/Behavioral: Negative.     All other systems reviewed and are negative.       Physical Exam  Vitals and nursing note reviewed.   Constitutional:       Appearance: He is ill-appearing.      Comments: Jaundice   HENT:      Head: Normocephalic.       "Right Ear: Tympanic membrane normal.      Left Ear: Tympanic membrane normal.      Nose: Nose normal.      Mouth/Throat:      Mouth: Mucous membranes are moist.      Pharynx: Oropharynx is clear.   Eyes:      Extraocular Movements: Extraocular movements intact.      Pupils: Pupils are equal, round, and reactive to light.   Cardiovascular:      Rate and Rhythm: Normal rate and regular rhythm.      Pulses: Normal pulses.      Heart sounds: Normal heart sounds.   Pulmonary:      Effort: Pulmonary effort is normal.      Breath sounds: Normal breath sounds.   Abdominal:      General: There is distension.      Hernia: A hernia is present.      Comments: Wound on dehiscence of the abdomen.   Musculoskeletal:         General: Normal range of motion.      Right lower leg: Edema present.      Left lower leg: Edema present.      Comments: BLE pitting edema +2   Skin:     General: Skin is warm.      Capillary Refill: Capillary refill takes 2 to 3 seconds.   Neurological:      General: No focal deficit present.      Mental Status: He is alert. Mental status is at baseline.   Psychiatric:         Mood and Affect: Mood normal.         Behavior: Behavior normal.          Last Recorded Vitals  Blood pressure 143/77, pulse 97, temperature 36.9 °C (98.4 °F), resp. rate 20, height 1.626 m (5' 4\"), weight 71 kg (156 lb 8.4 oz), SpO2 (!) 93%.    Relevant Results  Results for orders placed or performed during the hospital encounter of 08/29/24 (from the past 24 hour(s))   CBC with Differential   Result Value Ref Range    WBC 5.9 4.4 - 11.3 x10*3/uL    nRBC 0.0 0.0 - 0.0 /100 WBCs    RBC 3.18 (L) 4.50 - 5.90 x10*6/uL    Hemoglobin 11.3 (L) 13.5 - 17.5 g/dL    Hematocrit 32.2 (L) 41.0 - 52.0 %     (H) 80 - 100 fL    MCH 35.5 (H) 26.0 - 34.0 pg    MCHC 35.1 32.0 - 36.0 g/dL    RDW 19.3 (H) 11.5 - 14.5 %    Platelets 113 (L) 150 - 450 x10*3/uL    Neutrophils % 69.5 40.0 - 80.0 %    Immature Granulocytes %, Automated 0.7 0.0 - 0.9 %    " Lymphocytes % 19.4 13.0 - 44.0 %    Monocytes % 8.6 2.0 - 10.0 %    Eosinophils % 1.0 0.0 - 6.0 %    Basophils % 0.8 0.0 - 2.0 %    Neutrophils Absolute 4.13 1.20 - 7.70 x10*3/uL    Immature Granulocytes Absolute, Automated 0.04 0.00 - 0.70 x10*3/uL    Lymphocytes Absolute 1.15 (L) 1.20 - 4.80 x10*3/uL    Monocytes Absolute 0.51 0.10 - 1.00 x10*3/uL    Eosinophils Absolute 0.06 0.00 - 0.70 x10*3/uL    Basophils Absolute 0.05 0.00 - 0.10 x10*3/uL   Comprehensive Metabolic Panel   Result Value Ref Range    Glucose 123 (H) 65 - 99 mg/dL    Sodium 132 (L) 133 - 145 mmol/L    Potassium 3.0 (L) 3.4 - 5.1 mmol/L    Chloride 95 (L) 97 - 107 mmol/L    Bicarbonate 26 24 - 31 mmol/L    Urea Nitrogen <3 (L) 8 - 25 mg/dL    Creatinine 0.40 0.40 - 1.60 mg/dL    eGFR >90 >60 mL/min/1.73m*2    Calcium 7.9 (L) 8.5 - 10.4 mg/dL    Albumin 2.2 (L) 3.5 - 5.0 g/dL    Alkaline Phosphatase 179 (H) 35 - 125 U/L    Total Protein 6.9 5.9 - 7.9 g/dL     (H) 5 - 40 U/L    Bilirubin, Total 6.4 (H) 0.1 - 1.2 mg/dL    ALT 22 5 - 40 U/L    Anion Gap 11 <=19 mmol/L   Troponin T   Result Value Ref Range    Troponin T, High Sensitivity 10 <=14 ng/L   Lipase   Result Value Ref Range    Lipase <16 (L) 16 - 63 U/L   Troponin T   Result Value Ref Range    Troponin T, High Sensitivity 12 <=14 ng/L       XR chest 1 view  Result Date: 8/29/2024  Interpreted By:  Holden Salinas, STUDY: XR CHEST 1 VIEW;  8/29/2024 6:04 pm   INDICATION: Signs/Symptoms:evaluate for pleural effusion seen on ct abd.   COMPARISON: Chest radiograph 06/08/2024   ACCESSION NUMBER(S): [id number]   ORDERING CLINICIAN: DILLON KUNZ   FINDINGS:     CARDIOMEDIASTINAL SILHOUETTE: Cardiomediastinal silhouette is stable in size and configuration.   LUNGS: Large left-sided pleural effusion with left lower lung airspace opacification.   ABDOMEN: No remarkable upper abdominal findings.   BONES: No acute osseous changes.       1.  Large left-sided pleural effusion.     Signed by: Holden  Brad 8/29/2024 6:13 PM Dictation workstation:   [id number]    CT abdomen pelvis wo IV contrast  Result Date: 8/29/2024  Interpreted By:  Tamir Luevano, STUDY: CT ABDOMEN PELVIS WO IV CONTRAST; 8/29/2024 3:42 pm   INDICATION: Signs/Symptoms:abdominal pain, hx of ascites   COMPARISON: 06/05/2024.   ACCESSION NUMBER(S): [id number]   ORDERING CLINICIAN: SURINDER LAZO   TECHNIQUE: Spiral axial unenhanced images were obtained from the upper abdomen to the symphysis pubis. Oral contrast was not administered.   All CT examinations are performed with one or more of the following dose reduction techniques: Automated Exposure Control, adjustment of mA and/or kV according to patient size, or use of iterative reconstruction techniques.   FINDINGS: Lung bases: There is interval significant worsening of large left pleural effusion with left basilar atelectatic changes/infiltrates. Liver: Heterogenous cirrhotic liver with vague hypodensity again noted in the left lobe, similar to prior exam. Mild pneumobilia again seen. Associated worsening ascites in the abdomen and pelvis. Spleen: Splenomegaly again noted, without focal lesions. Pancreas: Previous Whipple's procedure, similar to prior exam. Associated prominent peripancreatic/periportal lymph nodes, similar to prior exam. Adrenal glands: No focal lesions. The kidneys are normal in size and position. There are no renal calculi or hydronephrosis. No abnormal calcifications are seen within the course of the ureters or within the partially distended bladder. Pelvic reproductive organs: Small calcifications again seen in the prostate gland.   Several colonic diverticula are again seen, without acute diverticulitis. There is again large ventral hernia containing several small bowel loops, without strangulation or bowel obstruction. Atherosclerotic calcifications involve the aorta and its branches, without focal aneurysm. Degenerative changes involve the L5-S1 level of the  lumbar spine.       1. Interval significant worsening of large left pleural effusion left basilar infiltrates/atelectasis; correlate clinically and follow-up as needed. 2. Heterogenous cirrhotic liver with pneumobilia and stable hypodense lesion in the left lobe. Splenomegaly. Worsening ascites in the abdomen and pelvis. 3.  previous Whipple's procedure. Associated prominent peripancreatic/periportal lymph nodes. 4. Large ventral hernia again seen, containing several small bowel loops, without strangulation or bowel obstruction. Colonic diverticulosis without acute diverticulitis.   Signed by: Tamir Luevano 8/29/2024 4:15 PM Dictation workstation:   [id number]        Assessment/Plan   Abdominal pain   CT of abd revealed splenomegaly, and worsening ascites with large ventral hernia. Recent paracentesis 06/2024.   Unable to get CT with contrast due to allergy to contrast.   Chronic abdominal wound.   -NPO for now  -Ordered IV Zofran and Morphine PRN  -Consulted GI    Left pleural effusion   XRAY of chest revealed large left pleural effusion.   Patient on room air, but does complain of SOB  -Monitor pulse ox  -Consulted pulmonary     Hypokalemia   -Replaced with IV 20MEQ  -Monitor     Weakness  -PT/OT to evaluate and treat    Hypertension   -Continue home medication, Lopressor     DVT prophylaxis   -Ordered Lovenox     Plan: Admit to RNF with telemetry. PT/OT to evaluate and treat. Appreciate recommendations from GI and pulmonology.     Jerrica Tse, APRN-CNP

## 2024-08-30 ENCOUNTER — APPOINTMENT (OUTPATIENT)
Dept: RADIOLOGY | Facility: HOSPITAL | Age: 54
End: 2024-08-30
Payer: COMMERCIAL

## 2024-08-30 LAB
AFP SERPL-MCNC: <4 NG/ML (ref 0–9)
ANION GAP SERPL CALC-SCNC: 8 MMOL/L
APPEARANCE UR: ABNORMAL
BASOPHILS NFR FLD MANUAL: 0 %
BILIRUB UR STRIP.AUTO-MCNC: ABNORMAL MG/DL
BLASTS NFR FLD MANUAL: 0 %
BUN SERPL-MCNC: <3 MG/DL (ref 8–25)
CALCIUM SERPL-MCNC: 7.5 MG/DL (ref 8.5–10.4)
CHLORIDE SERPL-SCNC: 102 MMOL/L (ref 97–107)
CLARITY FLD: ABNORMAL
CO2 SERPL-SCNC: 27 MMOL/L (ref 24–31)
COLOR FLD: YELLOW
COLOR UR: ABNORMAL
CREAT SERPL-MCNC: 0.3 MG/DL (ref 0.4–1.6)
EGFRCR SERPLBLD CKD-EPI 2021: >90 ML/MIN/1.73M*2
EOSINOPHIL NFR FLD MANUAL: 0 %
ERYTHROCYTE [DISTWIDTH] IN BLOOD BY AUTOMATED COUNT: 20 % (ref 11.5–14.5)
GLUCOSE FLD-MCNC: 102 MG/DL
GLUCOSE SERPL-MCNC: 76 MG/DL (ref 65–99)
GLUCOSE UR STRIP.AUTO-MCNC: NORMAL MG/DL
HCT VFR BLD AUTO: 31.8 % (ref 41–52)
HGB BLD-MCNC: 10.8 G/DL (ref 13.5–17.5)
HOLD SPECIMEN: NORMAL
HOLD SPECIMEN: NORMAL
IMMATURE GRANULOCYTES IN FLUID: 0 %
INR PPP: 2 (ref 0.9–1.2)
KETONES UR STRIP.AUTO-MCNC: ABNORMAL MG/DL
LDH FLD L TO P-CCNC: 58 U/L
LDH SERPL-CCNC: 213 U/L (ref 91–227)
LEUKOCYTE ESTERASE UR QL STRIP.AUTO: NEGATIVE
LYMPHOCYTES NFR FLD MANUAL: 23 %
MCH RBC QN AUTO: 35.1 PG (ref 26–34)
MCHC RBC AUTO-ENTMCNC: 34 G/DL (ref 32–36)
MCV RBC AUTO: 103 FL (ref 80–100)
MONOS+MACROS NFR FLD MANUAL: 70 %
NEUTROPHILS NFR FLD MANUAL: 5 %
NITRITE UR QL STRIP.AUTO: NEGATIVE
NRBC BLD-RTO: 0 /100 WBCS (ref 0–0)
OTHER CELLS NFR FLD MANUAL: 2 %
PH FLD: 7.2 [PH]
PH UR STRIP.AUTO: 6 [PH]
PLASMA CELLS NFR FLD MANUAL: 0 %
PLATELET # BLD AUTO: 97 X10*3/UL (ref 150–450)
POTASSIUM SERPL-SCNC: 3.9 MMOL/L (ref 3.4–5.1)
PROT FLD-MCNC: 1.6 G/DL
PROT SERPL-MCNC: 6.2 G/DL (ref 5.9–7.9)
PROT UR STRIP.AUTO-MCNC: NEGATIVE MG/DL
PROTHROMBIN TIME: 20.1 SECONDS (ref 9.3–12.7)
RBC # BLD AUTO: 3.08 X10*6/UL (ref 4.5–5.9)
RBC # FLD AUTO: 2000 /UL
RBC # UR STRIP.AUTO: NEGATIVE /UL
SODIUM SERPL-SCNC: 137 MMOL/L (ref 133–145)
SP GR UR STRIP.AUTO: 1.01
TOTAL CELLS COUNTED FLD: 100
UROBILINOGEN UR STRIP.AUTO-MCNC: ABNORMAL MG/DL
WBC # BLD AUTO: 5 X10*3/UL (ref 4.4–11.3)
WBC # FLD AUTO: 238 /UL

## 2024-08-30 PROCEDURE — 85610 PROTHROMBIN TIME: CPT

## 2024-08-30 PROCEDURE — 2500000001 HC RX 250 WO HCPCS SELF ADMINISTERED DRUGS (ALT 637 FOR MEDICARE OP): Performed by: NURSE PRACTITIONER

## 2024-08-30 PROCEDURE — 82374 ASSAY BLOOD CARBON DIOXIDE: CPT | Performed by: NURSE PRACTITIONER

## 2024-08-30 PROCEDURE — C1729 CATH, DRAINAGE: HCPCS

## 2024-08-30 PROCEDURE — 83986 ASSAY PH BODY FLUID NOS: CPT | Performed by: HOSPITALIST

## 2024-08-30 PROCEDURE — 88341 IMHCHEM/IMCYTCHM EA ADD ANTB: CPT | Mod: TC | Performed by: HOSPITALIST

## 2024-08-30 PROCEDURE — 89051 BODY FLUID CELL COUNT: CPT | Performed by: HOSPITALIST

## 2024-08-30 PROCEDURE — 99221 1ST HOSP IP/OBS SF/LOW 40: CPT | Performed by: NURSE PRACTITIONER

## 2024-08-30 PROCEDURE — 88112 CYTOPATH CELL ENHANCE TECH: CPT | Performed by: PATHOLOGY

## 2024-08-30 PROCEDURE — 0W9G3ZZ DRAINAGE OF PERITONEAL CAVITY, PERCUTANEOUS APPROACH: ICD-10-PCS | Performed by: INTERNAL MEDICINE

## 2024-08-30 PROCEDURE — 87116 MYCOBACTERIA CULTURE: CPT | Mod: WESLAB | Performed by: HOSPITALIST

## 2024-08-30 PROCEDURE — 2720000007 HC OR 272 NO HCPCS

## 2024-08-30 PROCEDURE — 81003 URINALYSIS AUTO W/O SCOPE: CPT | Performed by: EMERGENCY MEDICINE

## 2024-08-30 PROCEDURE — 87015 SPECIMEN INFECT AGNT CONCNTJ: CPT | Mod: WESLAB | Performed by: HOSPITALIST

## 2024-08-30 PROCEDURE — 32555 ASPIRATE PLEURA W/ IMAGING: CPT

## 2024-08-30 PROCEDURE — 82105 ALPHA-FETOPROTEIN SERUM: CPT | Mod: WESLAB

## 2024-08-30 PROCEDURE — 88305 TISSUE EXAM BY PATHOLOGIST: CPT | Performed by: PATHOLOGY

## 2024-08-30 PROCEDURE — 0W9B3ZZ DRAINAGE OF LEFT PLEURAL CAVITY, PERCUTANEOUS APPROACH: ICD-10-PCS | Performed by: INTERNAL MEDICINE

## 2024-08-30 PROCEDURE — 97161 PT EVAL LOW COMPLEX 20 MIN: CPT | Mod: GP

## 2024-08-30 PROCEDURE — 2500000005 HC RX 250 GENERAL PHARMACY W/O HCPCS: Performed by: RADIOLOGY

## 2024-08-30 PROCEDURE — 88341 IMHCHEM/IMCYTCHM EA ADD ANTB: CPT | Performed by: PATHOLOGY

## 2024-08-30 PROCEDURE — 36415 COLL VENOUS BLD VENIPUNCTURE: CPT | Performed by: NURSE PRACTITIONER

## 2024-08-30 PROCEDURE — 36415 COLL VENOUS BLD VENIPUNCTURE: CPT

## 2024-08-30 PROCEDURE — 97166 OT EVAL MOD COMPLEX 45 MIN: CPT | Mod: GO

## 2024-08-30 PROCEDURE — 84155 ASSAY OF PROTEIN SERUM: CPT | Performed by: HOSPITALIST

## 2024-08-30 PROCEDURE — 49083 ABD PARACENTESIS W/IMAGING: CPT

## 2024-08-30 PROCEDURE — 82945 GLUCOSE OTHER FLUID: CPT | Mod: WESLAB | Performed by: HOSPITALIST

## 2024-08-30 PROCEDURE — 87102 FUNGUS ISOLATION CULTURE: CPT | Mod: WESLAB | Performed by: HOSPITALIST

## 2024-08-30 PROCEDURE — 99222 1ST HOSP IP/OBS MODERATE 55: CPT | Performed by: STUDENT IN AN ORGANIZED HEALTH CARE EDUCATION/TRAINING PROGRAM

## 2024-08-30 PROCEDURE — 88104 CYTOPATH FL NONGYN SMEARS: CPT | Performed by: PATHOLOGY

## 2024-08-30 PROCEDURE — 2500000004 HC RX 250 GENERAL PHARMACY W/ HCPCS (ALT 636 FOR OP/ED): Performed by: NURSE PRACTITIONER

## 2024-08-30 PROCEDURE — 32555 ASPIRATE PLEURA W/ IMAGING: CPT | Performed by: RADIOLOGY

## 2024-08-30 PROCEDURE — 83615 LACTATE (LD) (LDH) ENZYME: CPT | Performed by: HOSPITALIST

## 2024-08-30 PROCEDURE — 87070 CULTURE OTHR SPECIMN AEROBIC: CPT | Mod: WESLAB | Performed by: HOSPITALIST

## 2024-08-30 PROCEDURE — 83615 LACTATE (LD) (LDH) ENZYME: CPT | Mod: WESLAB | Performed by: HOSPITALIST

## 2024-08-30 PROCEDURE — 88342 IMHCHEM/IMCYTCHM 1ST ANTB: CPT | Performed by: PATHOLOGY

## 2024-08-30 PROCEDURE — 85027 COMPLETE CBC AUTOMATED: CPT | Performed by: NURSE PRACTITIONER

## 2024-08-30 PROCEDURE — 2500000001 HC RX 250 WO HCPCS SELF ADMINISTERED DRUGS (ALT 637 FOR MEDICARE OP)

## 2024-08-30 PROCEDURE — 1200000002 HC GENERAL ROOM WITH TELEMETRY DAILY

## 2024-08-30 PROCEDURE — 84157 ASSAY OF PROTEIN OTHER: CPT | Mod: WESLAB | Performed by: HOSPITALIST

## 2024-08-30 PROCEDURE — 97116 GAIT TRAINING THERAPY: CPT | Mod: GP

## 2024-08-30 RX ORDER — LIDOCAINE HYDROCHLORIDE 10 MG/ML
INJECTION, SOLUTION EPIDURAL; INFILTRATION; INTRACAUDAL; PERINEURAL
Status: COMPLETED | OUTPATIENT
Start: 2024-08-30 | End: 2024-08-30

## 2024-08-30 RX ORDER — SPIRONOLACTONE 50 MG/1
50 TABLET, FILM COATED ORAL DAILY
Status: DISPENSED | OUTPATIENT
Start: 2024-08-30

## 2024-08-30 RX ORDER — OXYCODONE HYDROCHLORIDE 5 MG/1
5 TABLET ORAL EVERY 6 HOURS PRN
Status: DISPENSED | OUTPATIENT
Start: 2024-08-30

## 2024-08-30 RX ORDER — SUCRALFATE 1 G/1
1 TABLET ORAL
Status: DISPENSED | OUTPATIENT
Start: 2024-08-30

## 2024-08-30 RX ORDER — FUROSEMIDE 40 MG/1
40 TABLET ORAL ONCE
Status: DISPENSED | OUTPATIENT
Start: 2024-08-30

## 2024-08-30 SDOH — ECONOMIC STABILITY: HOUSING INSECURITY: AT ANY TIME IN THE PAST 12 MONTHS, WERE YOU HOMELESS OR LIVING IN A SHELTER (INCLUDING NOW)?: NO

## 2024-08-30 SDOH — HEALTH STABILITY: MENTAL HEALTH: HOW OFTEN DO YOU HAVE 6 OR MORE DRINKS ON ONE OCCASION?: NEVER

## 2024-08-30 SDOH — HEALTH STABILITY: PHYSICAL HEALTH: ON AVERAGE, HOW MANY DAYS PER WEEK DO YOU ENGAGE IN MODERATE TO STRENUOUS EXERCISE (LIKE A BRISK WALK)?: 0 DAYS

## 2024-08-30 SDOH — HEALTH STABILITY: MENTAL HEALTH: HOW OFTEN DO YOU HAVE A DRINK CONTAINING ALCOHOL?: NEVER

## 2024-08-30 SDOH — SOCIAL STABILITY: SOCIAL NETWORK: HOW OFTEN DO YOU ATTEND CHURCH OR RELIGIOUS SERVICES?: NEVER

## 2024-08-30 SDOH — SOCIAL STABILITY: SOCIAL NETWORK: HOW OFTEN DO YOU GET TOGETHER WITH FRIENDS OR RELATIVES?: TWICE A WEEK

## 2024-08-30 SDOH — SOCIAL STABILITY: SOCIAL INSECURITY: WITHIN THE LAST YEAR, HAVE YOU BEEN AFRAID OF YOUR PARTNER OR EX-PARTNER?: NO

## 2024-08-30 SDOH — HEALTH STABILITY: MENTAL HEALTH
HOW OFTEN DO YOU NEED TO HAVE SOMEONE HELP YOU WHEN YOU READ INSTRUCTIONS, PAMPHLETS, OR OTHER WRITTEN MATERIAL FROM YOUR DOCTOR OR PHARMACY?: NEVER

## 2024-08-30 SDOH — ECONOMIC STABILITY: FOOD INSECURITY: WITHIN THE PAST 12 MONTHS, YOU WORRIED THAT YOUR FOOD WOULD RUN OUT BEFORE YOU GOT MONEY TO BUY MORE.: NEVER TRUE

## 2024-08-30 SDOH — ECONOMIC STABILITY: INCOME INSECURITY: IN THE LAST 12 MONTHS, WAS THERE A TIME WHEN YOU WERE NOT ABLE TO PAY THE MORTGAGE OR RENT ON TIME?: NO

## 2024-08-30 SDOH — SOCIAL STABILITY: SOCIAL INSECURITY: WITHIN THE LAST YEAR, HAVE YOU BEEN HUMILIATED OR EMOTIONALLY ABUSED IN OTHER WAYS BY YOUR PARTNER OR EX-PARTNER?: NO

## 2024-08-30 SDOH — SOCIAL STABILITY: SOCIAL NETWORK: ARE YOU MARRIED, WIDOWED, DIVORCED, SEPARATED, NEVER MARRIED, OR LIVING WITH A PARTNER?: DIVORCED

## 2024-08-30 SDOH — HEALTH STABILITY: PHYSICAL HEALTH: ON AVERAGE, HOW MANY MINUTES DO YOU ENGAGE IN EXERCISE AT THIS LEVEL?: 0 MIN

## 2024-08-30 SDOH — HEALTH STABILITY: MENTAL HEALTH
STRESS IS WHEN SOMEONE FEELS TENSE, NERVOUS, ANXIOUS, OR CAN'T SLEEP AT NIGHT BECAUSE THEIR MIND IS TROUBLED. HOW STRESSED ARE YOU?: TO SOME EXTENT

## 2024-08-30 SDOH — ECONOMIC STABILITY: FOOD INSECURITY: WITHIN THE PAST 12 MONTHS, THE FOOD YOU BOUGHT JUST DIDN'T LAST AND YOU DIDN'T HAVE MONEY TO GET MORE.: NEVER TRUE

## 2024-08-30 SDOH — ECONOMIC STABILITY: INCOME INSECURITY: IN THE PAST 12 MONTHS, HAS THE ELECTRIC, GAS, OIL, OR WATER COMPANY THREATENED TO SHUT OFF SERVICE IN YOUR HOME?: NO

## 2024-08-30 SDOH — HEALTH STABILITY: MENTAL HEALTH: HOW MANY STANDARD DRINKS CONTAINING ALCOHOL DO YOU HAVE ON A TYPICAL DAY?: PATIENT DOES NOT DRINK

## 2024-08-30 SDOH — SOCIAL STABILITY: SOCIAL NETWORK: HOW OFTEN DO YOU ATTENT MEETINGS OF THE CLUB OR ORGANIZATION YOU BELONG TO?: NEVER

## 2024-08-30 SDOH — ECONOMIC STABILITY: INCOME INSECURITY: HOW HARD IS IT FOR YOU TO PAY FOR THE VERY BASICS LIKE FOOD, HOUSING, MEDICAL CARE, AND HEATING?: NOT VERY HARD

## 2024-08-30 SDOH — SOCIAL STABILITY: SOCIAL NETWORK: IN A TYPICAL WEEK, HOW MANY TIMES DO YOU TALK ON THE PHONE WITH FAMILY, FRIENDS, OR NEIGHBORS?: THREE TIMES A WEEK

## 2024-08-30 SDOH — ECONOMIC STABILITY: HOUSING INSECURITY: IN THE PAST 12 MONTHS, HOW MANY TIMES HAVE YOU MOVED WHERE YOU WERE LIVING?: 0

## 2024-08-30 ASSESSMENT — ENCOUNTER SYMPTOMS
WOUND: 1
LIGHT-HEADEDNESS: 1
NERVOUS/ANXIOUS: 1
ENDOCRINE NEGATIVE: 1
EYES NEGATIVE: 1
ALLERGIC/IMMUNOLOGIC NEGATIVE: 1
DECREASED CONCENTRATION: 1
ABDOMINAL PAIN: 1
ARTHRALGIAS: 1
VOMITING: 0
NEUROLOGICAL NEGATIVE: 1
PSYCHIATRIC NEGATIVE: 1
ROS SKIN COMMENTS: SEE WOUND
FATIGUE: 1
APPETITE CHANGE: 1
RESPIRATORY NEGATIVE: 1
NAUSEA: 1
MUSCULOSKELETAL NEGATIVE: 1
ABDOMINAL DISTENTION: 1
DIARRHEA: 1
SHORTNESS OF BREATH: 1
CARDIOVASCULAR NEGATIVE: 1
CONSTITUTIONAL NEGATIVE: 1
FEVER: 0
UNEXPECTED WEIGHT CHANGE: 1
HEMATOLOGIC/LYMPHATIC NEGATIVE: 1

## 2024-08-30 ASSESSMENT — PAIN - FUNCTIONAL ASSESSMENT
PAIN_FUNCTIONAL_ASSESSMENT: 0-10
PAIN_FUNCTIONAL_ASSESSMENT: WONG-BAKER FACES
PAIN_FUNCTIONAL_ASSESSMENT: 0-10

## 2024-08-30 ASSESSMENT — PAIN DESCRIPTION - DESCRIPTORS
DESCRIPTORS: ACHING
DESCRIPTORS: STABBING

## 2024-08-30 ASSESSMENT — PAIN SCALES - GENERAL
PAINLEVEL_OUTOF10: 0 - NO PAIN
PAINLEVEL_OUTOF10: 0 - NO PAIN
PAINLEVEL_OUTOF10: 5 - MODERATE PAIN
PAINLEVEL_OUTOF10: 5 - MODERATE PAIN
PAINLEVEL_OUTOF10: 3
PAINLEVEL_OUTOF10: 6
PAINLEVEL_OUTOF10: 0 - NO PAIN
PAINLEVEL_OUTOF10: 9
PAINLEVEL_OUTOF10: 6
PAINLEVEL_OUTOF10: 0 - NO PAIN
PAINLEVEL_OUTOF10: 10 - WORST POSSIBLE PAIN
PAINLEVEL_OUTOF10: 8
PAINLEVEL_OUTOF10: 0 - NO PAIN
PAINLEVEL_OUTOF10: 0 - NO PAIN

## 2024-08-30 ASSESSMENT — ACTIVITIES OF DAILY LIVING (ADL)
ADL_ASSISTANCE: INDEPENDENT
LACK_OF_TRANSPORTATION: NO
ADL_ASSISTANCE: NEEDS ASSISTANCE
BATHING_ASSISTANCE: MINIMAL

## 2024-08-30 ASSESSMENT — LIFESTYLE VARIABLES
SKIP TO QUESTIONS 9-10: 1
AUDIT-C TOTAL SCORE: 0

## 2024-08-30 ASSESSMENT — COGNITIVE AND FUNCTIONAL STATUS - GENERAL
STANDING UP FROM CHAIR USING ARMS: A LITTLE
PERSONAL GROOMING: A LITTLE
HELP NEEDED FOR BATHING: A LITTLE
MOVING FROM LYING ON BACK TO SITTING ON SIDE OF FLAT BED WITH BEDRAILS: A LITTLE
DRESSING REGULAR LOWER BODY CLOTHING: A LITTLE
WALKING IN HOSPITAL ROOM: A LITTLE
TOILETING: A LITTLE
DAILY ACTIVITIY SCORE: 19
MOVING FROM LYING ON BACK TO SITTING ON SIDE OF FLAT BED WITH BEDRAILS: A LITTLE
PERSONAL GROOMING: A LITTLE
MOVING TO AND FROM BED TO CHAIR: A LITTLE
TURNING FROM BACK TO SIDE WHILE IN FLAT BAD: A LITTLE
DAILY ACTIVITIY SCORE: 18
EATING MEALS: A LITTLE
HELP NEEDED FOR BATHING: A LITTLE
DRESSING REGULAR UPPER BODY CLOTHING: A LITTLE
TURNING FROM BACK TO SIDE WHILE IN FLAT BAD: A LITTLE
DRESSING REGULAR UPPER BODY CLOTHING: A LITTLE
MOBILITY SCORE: 18
CLIMB 3 TO 5 STEPS WITH RAILING: A LITTLE
MOBILITY SCORE: 17
CLIMB 3 TO 5 STEPS WITH RAILING: A LOT
STANDING UP FROM CHAIR USING ARMS: A LITTLE
WALKING IN HOSPITAL ROOM: A LITTLE
MOVING TO AND FROM BED TO CHAIR: A LITTLE
TOILETING: A LITTLE
DRESSING REGULAR LOWER BODY CLOTHING: A LITTLE

## 2024-08-30 ASSESSMENT — PAIN SCALES - WONG BAKER: WONGBAKER_NUMERICALRESPONSE: NO HURT

## 2024-08-30 NOTE — PROGRESS NOTES
Physical Therapy    Physical Therapy Evaluation & Treatment    Patient Name: Nils Hogan  MRN: [id number]  Today's Date: 8/30/2024   Time Calculation  Start Time: 0905  Stop Time: 0928  Time Calculation (min): 23 min    Assessment/Plan   PT Assessment  PT Assessment Results: Decreased strength, Decreased endurance, Impaired balance, Decreased mobility, Pain  Rehab Prognosis: Good  Evaluation/Treatment Tolerance: Patient tolerated treatment well  Medical Staff Made Aware: Yes  Strengths: Ability to acquire knowledge  Barriers to Participation: Comorbidities  End of Session Communication: Bedside nurse  Assessment Comment: patient presents with impaired functional mobility, including decreased BLE strength, impaired balance, decreased tolerance to activity.  pt requires CGA for safe mobility with rollator at this time. would benefit from continued skilled PT.  End of Session Patient Position: Up in chair, Alarm off, not on at start of session   IP OR SWING BED PT PLAN  Inpatient or Swing Bed: Inpatient  PT Plan  Treatment/Interventions: Bed mobility, Transfer training, Gait training, Balance training, Strengthening, Endurance training, Therapeutic exercise, Therapeutic activity  PT Plan: Ongoing PT  PT Frequency: 4 times per week  PT Discharge Recommendations: Low intensity level of continued care  Equipment Recommended upon Discharge: Wheeled walker  PT Recommended Transfer Status: Assist x1  PT - OK to Discharge: Yes      Subjective     General Visit Information:  General  Reason for Referral: PT eval and treat; 53 y/o male admit from home with complaints of abdominal pain.  pt with chronic abdominal wound. large abdominal hernia with dehiscence, s/p whipple procedure, duodenal cancer, paracentesis (6/24).  Referred By: KIMBERLEY Encarnacion-CNP  Past Medical History Relevant to Rehab: CVA with residial left sided weakness, duodenal cancer, whipple procedure, paracentesis, chronic abdominal wound/hernia with  dehisence, tracheostomy  Family/Caregiver Present: No  Prior to Session Communication: Bedside nurse  Patient Position Received: Bed, 3 rail up, Alarm off, not on at start of session  General Comment: patient cleared for therapy by nursing, supine upon arrival and agreeable to therapy.  Home Living:  Home Living  Type of Home: Mobile home  Lives With: Alone  Home Adaptive Equipment: Other (Comment) (rollator)  Home Layout: One level  Home Access: Ramped entrance  Bathroom Shower/Tub: Tub/shower unit  Bathroom Toilet: Standard  Bathroom Equipment: Grab bars in shower, Shower chair with back  Prior Level of Function:  Prior Function Per Pt/Caregiver Report  Level of [place]: Needs assistance with ADLs, Needs assistance with homemaking, Needs assistance with functional transfers (HHRN and HHA 1x/week, assists with bathing. reports difficulty with tub transfers.)  Receives Help From: Neighbor  ADL Assistance: Needs assistance (assist for bathing from HHA)  Homemaking Assistance: Needs assistance ([employer], [employer])  Driving/Transportation: Family/Friend  Ambulatory Assistance:  (mod ind with rollator)  Vocational: Retired  Precautions:  Precautions  Hearing/Visual Limitations: +glasses  Medical Precautions: Fall precautions    Objective   Pain:  Pain Assessment  Pain Assessment: 0-10  0-10 (Numeric) Pain Score: 8  Pain Type: Chronic pain  Pain Location: Abdomen  Pain Orientation: Right, Lower  Pain Interventions:  (RN in and to medicate)  Cognition:  Cognition  Overall Cognitive Status: Within Functional Limits  Orientation Level: Oriented X4    General Assessments:  General Observation  General Observation: alert, NAD. large abdominal hernia.     Activity Tolerance  Endurance: Decreased tolerance for upright activites    Sensation  Light Touch: No apparent deficits  Sensation Comment: BLE neuropathy    Strength  Strength Comments: RLE grossly >4-/5, LLE grossly >3/5  Strength  Strength Comments:  RLE grossly >4-/5, LLE grossly >3/5    Coordination  Movements are Fluid and Coordinated: No  Alternating Toe Taps: Dyskinesia  Heel to [place]: Dyskinesia    Postural Control  Posture Comment: forward head posture, rounded shoulders    Static Sitting Balance  Static Sitting-Comment/Number of Minutes: good  Dynamic Sitting Balance  Dynamic Sitting-Comments: good    Static Standing Balance  Static Standing-Comment/Number of Minutes: good-  Dynamic Standing Balance  Dynamic Standing-Comments: good-  Functional Assessments:  Bed Mobility  Bed Mobility:  (supervision supine to sit EOB for safety, requires increased time/effort and use of bed rail.  sleeps in lift chair at home.  performed using log roll.  denies s/s.  able to scoot to EOB with supervision. seated several min with supervision.)    Transfers  Transfer:  (min A sit <> stand at rollator to steady; cues for safe hand placement. good eccentric control into chair.)    Ambulation/Gait Training  Ambulation/Gait Training Performed:  (15' x 2 with rollator and CGA for safety; demonstrates decreased B step length, decreased nishi, decreased heel strike.  no LOB.  forward flexed posture. cues for pursed lip breathing throughout. PT managing O2 tank.)  Extremity/Trunk Assessments:  RLE   RLE : Within Functional Limits  LLE   LLE : Within Functional Limits  Treatments:  Bed Mobility  Bed Mobility:  (supervision supine to sit EOB for safety, requires increased time/effort and use of bed rail.  sleeps in lift chair at home.  performed using log roll.  denies s/s.  able to scoot to EOB with supervision. seated several min with supervision.)    Ambulation/Gait Training  Ambulation/Gait Training Performed:  (15' x 2 with rollator and CGA for safety; demonstrates decreased B step length, decreased nishi, decreased heel strike.  no LOB.  forward flexed posture. cues for pursed lip breathing throughout. PT managing O2 tank.)  Transfers  Transfer:  (min A sit <> stand at  rollator to steady; cues for safe hand placement. good eccentric control into chair.)  Outcome Measures:  [facility] Basic Mobility  Turning from your back to your side while in a flat bed without using bedrails: A little  Moving from lying on your back to sitting on the side of a flat bed without using bedrails: A little  Moving to and from bed to chair (including a wheelchair): A little  Standing up from a chair using your arms (e.g. wheelchair or bedside chair): A little  To walk in hospital room: A little  Climbing 3-5 steps with railing: A lot  Basic Mobility - Total Score: 17    Encounter Problems       Encounter Problems (Active)       PT Problem       Patient will demonstrate improvements in strength  (Progressing)       Start:  08/30/24    Expected End:  09/20/24            Patient will transfer supine <> sit modified independently  (Progressing)       Start:  08/30/24    Expected End:  09/20/24            Patient will transfer sit <> stand modified independently and use of rolling walker  (Progressing)       Start:  08/30/24    Expected End:  09/20/24            Patient will ambulate 100 ft modified independently and use of rolling walker  (Progressing)       Start:  08/30/24    Expected End:  09/20/24            Patient will demonstrate good understanding of safety techniques, use of adaptive equipment, body mechanics, precautions.  (Progressing)       Start:  08/30/24    Expected End:  09/20/24               Pain - Adult              Education Documentation  Precautions, taught by Yuli Clifton, PT at 8/30/2024 10:47 AM.  Learner: Patient  Readiness: Acceptance  Method: Explanation  Response: Needs Reinforcement    Body Mechanics, taught by Yuli Clifton PT at 8/30/2024 10:47 AM.  Learner: Patient  Readiness: Acceptance  Method: Explanation  Response: Needs Reinforcement    Mobility Training, taught by Yuli Clifton, PT at 8/30/2024 10:47 AM.  Learner: Patient  Readiness: Acceptance  Method:  Explanation  Response: Needs Reinforcement    Education Comments  No comments found.

## 2024-08-30 NOTE — PROGRESS NOTES
Occupational Therapy    Evaluation    Patient Name: Nils Hogan  MRN: [id number]  Today's Date: 8/30/2024  Time Calculation  Start Time: 0903  Stop Time: 0923  Time Calculation (min): 20 min        Assessment:  OT Assessment: pt presents with generalized weakness, residual left sided weakness, reduced endurance and decreased balance which impedes ADL performance. pt would benefit from skilled OT services to address these deficits and to facilitate highest level of independence.  Prognosis: Good  Barriers to Discharge: None  Evaluation/Treatment Tolerance: Patient limited by fatigue  Medical Staff Made Aware: Yes  End of Session Communication: Bedside nurse  End of Session Patient Position: Up in chair, Alarm off, not on at start of session  OT Assessment Results: Decreased ADL status, Decreased upper extremity range of motion, Decreased upper extremity strength, Decreased endurance, Decreased gross motor control, Decreased functional mobility  Prognosis: Good  Barriers to Discharge: None  Evaluation/Treatment Tolerance: Patient limited by fatigue  Medical Staff Made Aware: Yes  Strengths: Ability to acquire knowledge, Attitude of self  Barriers to Participation: Comorbidities  Plan:  Treatment Interventions: ADL retraining, Functional transfer training, UE strengthening/ROM, Endurance training, Patient/family training, Equipment evaluation/education, Compensatory technique education  OT Frequency: 4 times per week  OT Discharge Recommendations: Low intensity level of continued care  Equipment Recommended upon Discharge: Wheeled walker  OT Recommended Transfer Status: Assist of 1, Minimal assist  OT - OK to Discharge: Yes  Treatment Interventions: ADL retraining, Functional transfer training, UE strengthening/ROM, Endurance training, Patient/family training, Equipment evaluation/education, Compensatory technique education    Subjective     General:  General  Reason for Referral: ADL impairment; 54 yr old male  presenting with abdominal pain.  Imaging showing splenomegaly, and worsening ascites with large ventral hernia and large left pleural effusion.  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  Family/Caregiver Present: No  Co-Treatment: PT  Co-Treatment Reason: To maximize safety and participation  Prior to Session Communication: Bedside nurse  Patient Position Received: Bed, 3 rail up, Alarm off, not on at start of session  Preferred Learning Style: verbal, visual  General Comment: pt agreeable with therapy and cooperative. pt with significant hernia/distented abdomen with RN performing wound dressing.  Precautions:  Medical Precautions: Fall precautions  Precautions Comment: residual left sided weakness from CVA    Vital Sign (Past 2hrs)        Date/Time Vitals Session Patient Position Pulse Resp SpO2 BP MAP (mmHg)    08/30/24 0903 During OT  --  95  --  --  --  --     08/30/24 0905 During PT  --  95  --  --  --  --                         Pain:  Pain Assessment  Pain Assessment: 0-10  0-10 (Numeric) Pain Score: 5 - Moderate pain  Pain Type: Acute pain  Pain Location: Abdomen  Pain Interventions: Repositioned    Objective   Cognition:  Overall Cognitive Status: Within Functional Limits  Orientation Level: Oriented X4  Cognition Comments: Dysarthria at baseline           Home Living:  Type of Home: Mobile home  Lives With: Alone  Home Adaptive Equipment:  (Rollator)  Home Layout: One level  Home Access: Ramped entrance  Bathroom Shower/Tub: Tub/shower unit  Bathroom Toilet: Adaptive toilet seating  Bathroom Equipment: Grab bars in shower, Shower chair with back  Bathroom Accessibility: first floor  Home Living  Comments: sleeps in lift chair  Prior Function:  Level of [place]: Independent with ADLs and functional transfers (home health aide x1 a week to assist with bathing)  Receives Help From:  (aide once a week)  ADL Assistance: Independent  Homemaking Assistance: Needs assistance  Ambulatory Assistance: Independent (with rollator)  Vocational: Retired  Hand Dominance: Right  Prior Function Comments: meals on wheels or instacart for groceries.  RN and aide come once a week for wound care and for bathing.  Does not drive at baseline.     ADL:  Eating Assistance: Independent (anticipated)  Grooming Assistance: Stand by (anticipated)  Bathing Assistance: Minimal (anticipated)  UE Dressing Assistance: Minimal (anticipated)  LE Dressing Assistance: Minimal (anticipated)  Toileting Assistance with Device: Minimal (anticipated)  ADL Comments: scores based off clinical observation and per patient performance observed  Activity Tolerance:  Endurance: Tolerates 10 - 20 min exercise with multiple rests  Bed Mobility/Transfers: Bed Mobility  Bed Mobility: Yes  Bed Mobility 1  Bed Mobility 1: Supine to sitting  Level of Assistance 1: Close supervision  Bed Mobility Comments 1: no physical assistance, performed with HOB slighly elevated and use of bed rails    Transfers  Transfer: Yes  Transfer 1  Technique 1: Sit to stand, Stand to sit  Transfer Device 1:  (rollator)  Transfer Level of Assistance 1: Minimum assistance  Trials/Comments 1: from edge of bed, cues for positioning and hand placement; effortful and requiring MIN A to ascend into standing position  Transfers 2  Transfer to 2: Chair with arms  Technique 2: Stand pivot  Transfer Device 2:  (rollator)  Transfer Level of Assistance 2: Contact guard  Trials/Comments 2: pt transferred to standard chair at end of session after ambulating from doorway and back with rollator.  cues for positioning and locking brakes prior to descent      Functional Mobility:  Functional  Mobility  Functional Mobility Performed: Yes  Functional Mobility 1  Surface 1: Level tile  Device 1:  (rollator)  Assistance 1: Contact guard  Comments 1: min household distances including to room door and back.  performed with rollator at CGA for safety.  cues for locking brakes when ascending/descending.  Sitting Balance:  Static Sitting Balance  Static Sitting-Balance Support: Feet supported  Static Sitting-Level of Assistance: Distant supervision  Standing Balance:  Static Standing Balance  Static Standing-Balance Support: Bilateral upper extremity supported  Static Standing-Level of Assistance: Contact guard  Static Standing-Comment/Number of Minutes: CGA once standing standing with rollator      Vision:Vision - Basic Assessment  Current Vision: No visual deficits  Sensation:  Sensation Comment: pt reporting paresthesia in distal BLE from neuropathy  Strength:  Strength Comments: RUE 5/5, LUE 3+/5; residual weakness from previous CVA however functional  Perception:  Inattention/Neglect: Appears intact  Coordination:  Movements are Fluid and Coordinated: No  Upper Body Coordination: LUE impaired from previous CVA; ataxic and over/under shooting present   Hand Function:  Gross Grasp: Functional  Coordination: Functional  Extremities: RUE   RUE : Within Functional Limits and LUE   LUE: Within Functional Limits    Outcome Measures:[facility] Daily Activity  Putting on and taking off regular lower body clothing: A little  Bathing (including washing, rinsing, drying): A little  Putting on and taking off regular upper body clothing: A little  Toileting, which includes using toilet, bedpan or urinal: A little  Taking care of personal grooming such as brushing teeth: A little  Eating Meals: None  Daily Activity - Total Score: 19        Education Documentation  Body Mechanics, taught by Sharif Mcknight OT at 8/30/2024 10:23 AM.  Learner: Patient  Readiness: Acceptance  Method: Demonstration, Explanation  Response: Verbalizes  Understanding    ADL Training, taught by Sharif Mcknight OT at 8/30/2024 10:23 AM.  Learner: Patient  Readiness: Acceptance  Method: Demonstration, Explanation  Response: Verbalizes Understanding    Education Comments  No comments found.        OP EDUCATION:       Goals:  Encounter Problems       Encounter Problems (Active)       ADLs       Patient with complete upper body dressing with independent level of assistance donning and doffing all UE clothes while edge of bed  (Progressing)       Start:  08/30/24    Expected End:  09/30/24            Patient with complete lower body dressing with modified independent level of assistance donning and doffing all LE clothes  with PRN adaptive equipment while edge of bed  (Progressing)       Start:  08/30/24    Expected End:  09/30/24            Patient will complete daily grooming tasks with modified independent level of assistance and PRN adaptive equipment while standing. (Progressing)       Start:  08/30/24    Expected End:  09/30/24            Patient will complete toileting including hygiene clothing management/hygiene with modified independent level of assistance and grab bars. (Progressing)       Start:  08/30/24    Expected End:  09/30/24               MOBILITY       Patient will perform Functional mobility max Household distances/Community Distances with modified independent level of assistance and least restrictive device in order to improve safety and functional mobility. (Progressing)       Start:  08/30/24    Expected End:  09/30/24               TRANSFERS       Patient will complete functional transfer to toilet/commode with least restrictive device with modified independent level of assistance. (Progressing)       Start:  08/30/24    Expected End:  09/30/24

## 2024-08-30 NOTE — PROGRESS NOTES
TCC spoke to patient at bedside. Patient is from home alone and has some care in the home as noted below. Patient states friends transport him to appointments. PCP is yvonne Bryant. [facility] is pharmacy of choice. TCC spoke to patient, updated regarding therapy recommendations. Patient agreeable to [facility] at this time.      08/30/24 1020   Discharge Planning   Living Arrangements Alone   Support Systems Friends/neighbors;Family members   Assistance Needed HAs a lift chair that he sleeps in. grab bars and SC in bathroom. patient states he has a shower aid that comes once a week to help bathe him. has an RN that comes once a week. and states caresolanie is setting up a HHA to come daily. patient uses a rolator.   Type of Residence Private residence   Number of Stairs to Enter Residence 6  (has a ramp)   Number of Stairs Within Residence 0   Type of Animals or Pets dog   Who is requesting discharge planning? Provider   Home or Post Acute Services In home services   Type of Home Care Services Home PT;Home OT   Expected Discharge Disposition  Services   Does the patient need discharge transport arranged? Yes   Financial Resource Strain   How hard is it for you to pay for the very basics like food, housing, medical care, and heating? Not very   Housing Stability   In the last 12 months, was there a time when you were not able to pay the mortgage or rent on time? N   In the past 12 months, how many times have you moved where you were living? 0   At any time in the past 12 months, were you homeless or living in a shelter (including now)? N   Transportation Needs   In the past 12 months, has lack of transportation kept you from medical appointments or from getting medications? no   In the past 12 months, has lack of transportation kept you from meetings, work, or from getting things needed for daily living? No   Patient Choice   Provider Choice list and CMS website (https://medicare.gov/care-compare#search) for  post-acute Quality and Resource Measure Data were provided and reviewed with: Patient   Patient / Family choosing to utilize agency / facility established prior to hospitalization No

## 2024-08-30 NOTE — H&P
History Of Present Illness  Nils Hogan is a 54 y.o. male presenting with ASCITES, THORACENTESIS, NEEDS DRAINAGE ,LEFT PLEURAL EFFUSSION, HOLD LOVENOX, PLT CT BELOW 100,000.     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    Surgical History  He has a past surgical history that includes Adenoidectomy; Whipple procedure (04/22/2019); and Urethra surgery.     Social History  He reports that he quit smoking about 4 weeks ago. His smoking use included cigarettes. He started smoking about 37 years ago. He has a 9.4 pack-year smoking history. He does not have any smokeless tobacco history on file. He reports current alcohol use. No history on file for drug use.    Family History  Family History   Problem Relation Name Age of Onset    Stroke Mother      Bipolar disorder Mother      Prostate cancer Father      Hypertension Sister      Hyperlipidemia Sister      Asthma Son      Colon cancer Paternal Grandfather          Allergies  Diazepam, Iodinated contrast media, Iodine, Zosyn [piperacillin-tazobactam], Acetaminophen, and Phenytoin sodium extended    Review of Systems   Constitutional:  Positive for appetite change, fatigue and unexpected weight change.   HENT: Negative.     Eyes: Negative.    Respiratory:  Positive for shortness of breath.    Cardiovascular: Negative.    Gastrointestinal:  Positive for abdominal distention.        WOUND ABDOMINAL WALL   Endocrine: Negative.    Genitourinary: Negative.    Musculoskeletal:  Positive for arthralgias.   Skin:         SEE WOUND   Allergic/Immunologic: Negative.    Neurological:  Positive for light-headedness.   Hematological: Negative.    Psychiatric/Behavioral:  The patient is nervous/anxious.         Physical Exam  Constitutional:       Appearance: He is ill-appearing.    HENT:      Head: Normocephalic and atraumatic.      Mouth/Throat:      Mouth: Mucous membranes are moist.   Eyes:      Extraocular Movements: Extraocular movements intact.      Pupils: Pupils are equal, round, and reactive to light.   Cardiovascular:      Rate and Rhythm: Regular rhythm.   Pulmonary:      Effort: Pulmonary effort is normal.      Comments: DECREASED BREATH SOUNDS LEFT BASE LUNG  Abdominal:      General: Bowel sounds are normal. There is distension.      Comments: ABDOMINAL WOUND   Musculoskeletal:         General: Normal range of motion.      Cervical back: Normal range of motion.   Skin:     General: Skin is warm.   Neurological:      General: No focal deficit present.   Psychiatric:      Comments: AGITATED AT TIMES          Last Recorded Vitals  /65 (BP Location: Left arm, Patient Position: Lying)   Pulse 95   Temp 36.5 °C (97.7 °F) (Temporal)   Resp 16   Wt 71.6 kg (157 lb 13.6 oz)   SpO2 98%     Relevant Results        HYPOKALEMIA,JAUNDICE, ABDOMINAL DISTENSSION AND WOUND     Assessment/Plan   Assessment & Plan  Generalized abdominal pain      SEE ABOVE, THROMBOCYTOPENIA PROBABLE COMBINATION ALCOHOOL AND LOVENOX, HOLD LOVENOX       Barbara Brunson MD

## 2024-08-30 NOTE — CONSULTS
Inpatient consult to gastroenterology  Consult performed by: DEJA White  Consult ordered by: DEJA Encarnacion          Reason For Consult  Cirrhosis     History Of Present Illness  Nils Hogan is a 54 y.o. 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 Patient presented right upper quadrant abdominal pain, weakness, and lack of appetite for about a week. He reports he has a hx of heavy drinking in the past, but only drinks few beers a week now. He was having diarrhea for a few days prior to admission and took pepto-bismol. Last bm was yesterday, which he reports is loose and dark in coloration. Labs show stable H/H at 10.8/31.8. No leukocytosis. CT showed cirrhotic liver with pneumobilia and stable hypodense lesion in the left lobe. Splenomegaly. Worsening ascites in the abdomen and pelvis. EGD (3/27/19) - showed mild gastritis and large ampullary mass with partial obstruction.     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    Surgical History  He has a past surgical history that includes Adenoidectomy; Whipple procedure (04/22/2019); and Urethra surgery.     Social History  He reports that he quit smoking about 4 weeks ago. His smoking use included cigarettes. He started smoking about 37 years ago. He has a 9.4 pack-year smoking history. He does not have any smokeless tobacco history on file. He reports current alcohol use. No history on file for drug  "use.    Family History  Family History   Problem Relation Name Age of Onset    Stroke Mother      Bipolar disorder Mother      Prostate cancer Father      Hypertension Sister      Hyperlipidemia Sister      Asthma Son      Colon cancer Paternal Grandfather          Allergies  Diazepam, Iodinated contrast media, Iodine, Zosyn [piperacillin-tazobactam], Acetaminophen, and Phenytoin sodium extended    Review of Systems   Constitutional: Negative.    HENT: Negative.     Eyes: Negative.    Respiratory: Negative.     Cardiovascular: Negative.    Gastrointestinal:  Positive for diarrhea.   Endocrine: Negative.    Genitourinary: Negative.    Musculoskeletal: Negative.    Skin: Negative.    Allergic/Immunologic: Negative.    Neurological: Negative.    Hematological: Negative.    Psychiatric/Behavioral: Negative.          Physical Exam  HENT:      Head: Normocephalic.      Nose: Nose normal.      Mouth/Throat:      Mouth: Mucous membranes are dry.   Eyes:      Pupils: Pupils are equal, round, and reactive to light.   Pulmonary:      Effort: Pulmonary effort is normal.   Abdominal:      General: There is distension.   Musculoskeletal:         General: Normal range of motion.      Cervical back: Normal range of motion.   Skin:     General: Skin is warm.   Neurological:      General: No focal deficit present.      Mental Status: He is alert.   Psychiatric:         Mood and Affect: Mood normal.          Last Recorded Vitals  Blood pressure 114/65, pulse 95, temperature 36.5 °C (97.7 °F), temperature source Temporal, resp. rate 16, height 1.626 m (5' 4\"), weight 71.6 kg (157 lb 13.6 oz), SpO2 98%.    Relevant Results  XR chest 1 view    Result Date: 8/29/2024  Interpreted By:  Holden Salinas, STUDY: XR CHEST 1 VIEW;  8/29/2024 6:04 pm   INDICATION: Signs/Symptoms:evaluate for pleural effusion seen on ct abd.   COMPARISON: Chest radiograph 06/08/2024   ACCESSION NUMBER(S): [id number]   ORDERING CLINICIAN: DILLON KUNZ   FINDINGS:  "    CARDIOMEDIASTINAL SILHOUETTE: Cardiomediastinal silhouette is stable in size and configuration.   LUNGS: Large left-sided pleural effusion with left lower lung airspace opacification.   ABDOMEN: No remarkable upper abdominal findings.   BONES: No acute osseous changes.       1.  Large left-sided pleural effusion.     Signed by: Holden Salinas 8/29/2024 6:13 PM Dictation workstation:   [id number]    CT abdomen pelvis wo IV contrast    Result Date: 8/29/2024  Interpreted By:  Tamir Luevano, STUDY: CT ABDOMEN PELVIS WO IV CONTRAST; 8/29/2024 3:42 pm   INDICATION: Signs/Symptoms:abdominal pain, hx of ascites   COMPARISON: 06/05/2024.   ACCESSION NUMBER(S): [id number]   ORDERING CLINICIAN: SURINDER LAZO   TECHNIQUE: Spiral axial unenhanced images were obtained from the upper abdomen to the symphysis pubis. Oral contrast was not administered.   All CT examinations are performed with one or more of the following dose reduction techniques: Automated Exposure Control, adjustment of mA and/or kV according to patient size, or use of iterative reconstruction techniques.   FINDINGS: Lung bases: There is interval significant worsening of large left pleural effusion with left basilar atelectatic changes/infiltrates. Liver: Heterogenous cirrhotic liver with vague hypodensity again noted in the left lobe, similar to prior exam. Mild pneumobilia again seen. Associated worsening ascites in the abdomen and pelvis. Spleen: Splenomegaly again noted, without focal lesions. Pancreas: Previous Whipple's procedure, similar to prior exam. Associated prominent peripancreatic/periportal lymph nodes, similar to prior exam. Adrenal glands: No focal lesions. The kidneys are normal in size and position. There are no renal calculi or hydronephrosis. No abnormal calcifications are seen within the course of the ureters or within the partially distended bladder. Pelvic reproductive organs: Small calcifications again seen in the prostate  gland.   Several colonic diverticula are again seen, without acute diverticulitis. There is again large ventral hernia containing several small bowel loops, without strangulation or bowel obstruction. Atherosclerotic calcifications involve the aorta and its branches, without focal aneurysm. Degenerative changes involve the L5-S1 level of the lumbar spine.       1. Interval significant worsening of large left pleural effusion left basilar infiltrates/atelectasis; correlate clinically and follow-up as needed. 2. Heterogenous cirrhotic liver with pneumobilia and stable hypodense lesion in the left lobe. Splenomegaly. Worsening ascites in the abdomen and pelvis. 3.  previous Whipple's procedure. Associated prominent peripancreatic/periportal lymph nodes. 4. Large ventral hernia again seen, containing several small bowel loops, without strangulation or bowel obstruction. Colonic diverticulosis without acute diverticulitis.   Signed by: Tamir Luevano 8/29/2024 4:15 PM Dictation workstation:   [id number]      Scheduled medications  aspirin, 81 mg, oral, Daily  enoxaparin, 40 mg, subcutaneous, Daily  metoprolol tartrate, 25 mg, oral, BID      Continuous medications     PRN medications  PRN medications: ALPRAZolam, morphine, ondansetron **OR** ondansetron, polyethylene glycol  Results for orders placed or performed during the hospital encounter of 08/29/24 (from the past 24 hour(s))   CBC with Differential   Result Value Ref Range    WBC 5.9 4.4 - 11.3 x10*3/uL    nRBC 0.0 0.0 - 0.0 /100 WBCs    RBC 3.18 (L) 4.50 - 5.90 x10*6/uL    Hemoglobin 11.3 (L) 13.5 - 17.5 g/dL    Hematocrit 32.2 (L) 41.0 - 52.0 %     (H) 80 - 100 fL    MCH 35.5 (H) 26.0 - 34.0 pg    MCHC 35.1 32.0 - 36.0 g/dL    RDW 19.3 (H) 11.5 - 14.5 %    Platelets 113 (L) 150 - 450 x10*3/uL    Neutrophils % 69.5 40.0 - 80.0 %    Immature Granulocytes %, Automated 0.7 0.0 - 0.9 %    Lymphocytes % 19.4 13.0 - 44.0 %    Monocytes % 8.6 2.0 - 10.0 %     Eosinophils % 1.0 0.0 - 6.0 %    Basophils % 0.8 0.0 - 2.0 %    Neutrophils Absolute 4.13 1.20 - 7.70 x10*3/uL    Immature Granulocytes Absolute, Automated 0.04 0.00 - 0.70 x10*3/uL    Lymphocytes Absolute 1.15 (L) 1.20 - 4.80 x10*3/uL    Monocytes Absolute 0.51 0.10 - 1.00 x10*3/uL    Eosinophils Absolute 0.06 0.00 - 0.70 x10*3/uL    Basophils Absolute 0.05 0.00 - 0.10 x10*3/uL   Comprehensive Metabolic Panel   Result Value Ref Range    Glucose 123 (H) 65 - 99 mg/dL    Sodium 132 (L) 133 - 145 mmol/L    Potassium 3.0 (L) 3.4 - 5.1 mmol/L    Chloride 95 (L) 97 - 107 mmol/L    Bicarbonate 26 24 - 31 mmol/L    Urea Nitrogen <3 (L) 8 - 25 mg/dL    Creatinine 0.40 0.40 - 1.60 mg/dL    eGFR >90 >60 mL/min/1.73m*2    Calcium 7.9 (L) 8.5 - 10.4 mg/dL    Albumin 2.2 (L) 3.5 - 5.0 g/dL    Alkaline Phosphatase 179 (H) 35 - 125 U/L    Total Protein 6.9 5.9 - 7.9 g/dL     (H) 5 - 40 U/L    Bilirubin, Total 6.4 (H) 0.1 - 1.2 mg/dL    ALT 22 5 - 40 U/L    Anion Gap 11 <=19 mmol/L   Troponin T   Result Value Ref Range    Troponin T, High Sensitivity 10 <=14 ng/L   Lipase   Result Value Ref Range    Lipase <16 (L) 16 - 63 U/L   Troponin T   Result Value Ref Range    Troponin T, High Sensitivity 12 <=14 ng/L   CBC   Result Value Ref Range    WBC 5.0 4.4 - 11.3 x10*3/uL    nRBC 0.0 0.0 - 0.0 /100 WBCs    RBC 3.08 (L) 4.50 - 5.90 x10*6/uL    Hemoglobin 10.8 (L) 13.5 - 17.5 g/dL    Hematocrit 31.8 (L) 41.0 - 52.0 %     (H) 80 - 100 fL    MCH 35.1 (H) 26.0 - 34.0 pg    MCHC 34.0 32.0 - 36.0 g/dL    RDW 20.0 (H) 11.5 - 14.5 %    Platelets 97 (L) 150 - 450 x10*3/uL   Basic metabolic panel   Result Value Ref Range    Glucose 76 65 - 99 mg/dL    Sodium 137 133 - 145 mmol/L    Potassium 3.9 3.4 - 5.1 mmol/L    Chloride 102 97 - 107 mmol/L    Bicarbonate 27 24 - 31 mmol/L    Urea Nitrogen <3 (L) 8 - 25 mg/dL    Creatinine 0.30 (L) 0.40 - 1.60 mg/dL    eGFR >90 >60 mL/min/1.73m*2    Calcium 7.5 (L) 8.5 - 10.4 mg/dL    Anion Gap 8  <=19 mmol/L        Assessment/Plan     Alcoholic Cirrhosis hx ETOH abuse.    ALT 22   HCC- AFP for survelliance  HE: A/O x 3   EV:  last EGD 2019. No varices. No hematemesis. Recommend EGD at some point for surviallence   Ascites: Para ordered with fluid analysis. Start lasix/aldactone  Will order INR to Calculate Meld     Anemia  Macrocytic anemia hgb 10.8  Reports of dark stool, however he was on Pepto-bismol which turn stool dark.  No urgent indication for scopes   High dose PPI   Add Carafate    H/H stable   - Monitor  CBC, CMP, INR,  - Transfuse to keep hgb >7    Nargis Leger, APRN-CNP

## 2024-08-30 NOTE — NURSING NOTE
Secure messaged Dr. Brunson at 915 requesting order changes. No response. Called Dr. Brunson to request switch from morphine to oxy per patient request. Patient also very much wants to eat and per other Mds should be able to eat as local anesthesia today not general. Patient aware other Mds have told him he should be able to eat. No answer and mailbox full. Patient reports he did not see Dr. Brunson this AM.

## 2024-08-30 NOTE — NURSING NOTE
Per Dr. Brunson, NP will be here to see patient and change orders. No NP until 3pm,. Reached out to Dr. Brunson again to change orders.

## 2024-08-30 NOTE — PROGRESS NOTES
Spiritual Care Visit    Clinical Encounter Type  Visited With: Patient  Routine Visit: Introduction    Moravian Encounters  Moravian Needs: Prayer, Sacred text     Annotation:  provided patient support while rounding the Unit.  introduced  services of    explained the role of the  in providing emotional and spiritual support for patient's and family while in admitted to the hospital. Patient is a [place] vet and this  offered his thanks and support for his service.  Patient stated that he was experiencing GI issues and could to keep food down. Patient stated that he was going to have a procedure to have fluid drained. Patient talked at length about his understanding of God and various Muslim traditions.  He stated that he considers himself a Worship. During the visit the patient was exploring his mortality and what comes after death.  helped the patient explore his understanding and he seemed to find peace by the end of the visit. Prayer was offered consistent with his tradition. No other spiritual or Muslim needs were expressed. Spiritual care will remain available for support as requested.                                    Taxonomy  Intended Effects: Establish rapport and connectedness, Build relationship of care and support, Demonstrate caring and concern, Lessen someone's feelings of isolation, Helping someone feel comforted  Methods: Assist with finding purpose, Encourage end of life review, Encourage self reflection, Explore spiritual/Muslim beliefs, Offer support  Interventions: Active listening, Ask guided questions about cultural and Muslim values, Ask guided questions about purpose, Ask guided questions about maris, Discuss concerns, Explain  role, Facilitate life review, Identify supportive relationship(s), [place]

## 2024-08-30 NOTE — CONSULTS
"Assessment/Plan   If pt's incisional hernia becomes obstructed will need [facility] transfer for surgical consideration. Pt is post Whipple, and given his other comorbid conditions, pt would not be a surgical candidate here at this [facility] hospital. Local wound care. Shon. Discussed with Dr. López.   Inpatient consult to Acute Care Surgery  Consult performed by: Lily Askew, APRN-CNP  Consult ordered by: Barbara Brunson MD  Reason for consult: abdominal wound  Assessment/Recommendations: Pt with chronic abdominal wound over incisional hernia. Improved since last encounter with this pt. Wash abdominal wound over incisional hernia with soap and water, apply medihoney and [id number] mepilex. Change daily and prn.         Subjective     Pt states he was admitted for \"dark urine\". Per EMR, his chief complaint was right upper quadrant abdominal pain, lack of appetite and melena. Pt states that his chronic wound over his prominent incisional hernia is improving using medihoney and silicone bordered foam dressings. His hernia is non painful. He denies nausea or vomiting. Last bm was yesterday, which he reports is loose and dark in coloration. Endorses poor appetite. No chest pain. Some dyspnea on exertion.    Pt is known to our service from a previous admission. Past med hx significant for CVA, duodenal cancer and thus underwent Whipple procedure in 2019 with [facility].      Imaging revealing for prominent incisional hernia, with no evidence for obstruction. Liver cirrhosis with worsening ascites. (Underwent paracentesis in 6/24). Enlarged periportal/peripancreatic lymph nodes are noted. Pleural effusion noted on Left.     Currently feeling ok. No pain at wound site. Per EMR record, he is scheduled with IR for thora and para taps today.     Abdominal Pain  Associated symptoms include diarrhea and nausea. Pertinent negatives include no fever or vomiting.       Review of Systems  Review of Systems   Constitutional:  Positive for " fatigue. Negative for fever.   HENT: Negative.     Eyes: Negative.    Respiratory: Negative.     Cardiovascular: Negative.    Gastrointestinal:  Positive for abdominal distention, abdominal pain, diarrhea and nausea. Negative for vomiting.   Endocrine: Negative.    Genitourinary: Negative.    Musculoskeletal: Negative.    Skin:  Positive for wound.   Allergic/Immunologic: Negative.    Neurological: Negative.    Hematological: Negative.    Psychiatric/Behavioral:  Positive for decreased concentration.        Objective     Vital signs for last 24 hours:  Temp:  [36.3 °C (97.3 °F)-36.9 °C (98.4 °F)] 36.5 °C (97.7 °F)  Heart Rate:  [] 95  Resp:  [14-20] 16  BP: (104-143)/(57-77) 114/65    Intake/Output this shift:  I/O this shift:  In: 0   Out: 350 [Urine:350]    Physical Exam  Physical Exam  Constitutional:       Appearance: Normal appearance. He is ill-appearing.   HENT:      Head: Normocephalic and atraumatic.      Right Ear: Tympanic membrane normal.      Nose: Nose normal.      Mouth/Throat:      Mouth: Mucous membranes are moist.   Eyes:      Pupils: Pupils are equal, round, and reactive to light.   Cardiovascular:      Rate and Rhythm: Normal rate and regular rhythm.      Pulses: Normal pulses.   Pulmonary:      Effort: Pulmonary effort is normal.      Breath sounds: Normal breath sounds.   Abdominal:      General: Bowel sounds are normal. There is distension.      Palpations: Abdomen is soft.      Tenderness: There is abdominal tenderness. There is no guarding.      Hernia: A hernia is present.      Comments: Large protuberant incisional hernia with wound overlying hernia. Non tender. Non reducible.    Genitourinary:     Rectum: Normal.   Musculoskeletal:         General: Normal range of motion.   Skin:     General: Skin is warm and dry.      Comments: Small wound overlying hernia measuring approximately 2.5 x 1.5 x 0.1cm, down to dermis. Minimal clear drainage. Wound is pink with some epithelization  occurring. No signs of infection.    Neurological:      General: No focal deficit present.      Mental Status: He is alert.   Psychiatric:         Mood and Affect: Mood normal.         Behavior: Behavior normal.         Labs  CBC:   Lab Results   Component Value Date    WBC 5.0 08/30/2024    RBC 3.08 (L) 08/30/2024     BMP:   Lab Results   Component Value Date    GLUCOSE 76 08/30/2024    CO2 27 08/30/2024    BUN <3 (L) 08/30/2024    CREATININE 0.30 (L) 08/30/2024    CALCIUM 7.5 (L) 08/30/2024

## 2024-08-30 NOTE — CONSULTS
Pulmonary Consult Note    Chief Complaint   Patient presents with    Abdominal Pain     Patient presents to the ED with abdominal pain. Been having diarrhea for over 1 week. Has not been eating.        Reason for consultation:  Pleural effusion    History Of Present Illness  Nils Hogan is a 54 y.o. male presenting with history as documented below who presented to our facility for abdominal pain and decreased appetite.  He denies any significant shortness of breath.  He also has been having diarrhea over the last week.  He has has chronic abdominal pain related to abdominal hernia.  He was recently hospitalized at [facility] and underwent abdominal aspiration given concern for intra-abdominal abscess and was discharged on a course of antibiotics.  He was seen by his home health aide and him to come to the emergency room given concern for the decreased appetite as well as the diarrhea.  On admission he was noted to have large left pleural effusion.  He denies any fevers, chills or chest pain.  Were asked to assist in his management.       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    Surgical History  He has a past surgical history that includes Adenoidectomy; Whipple procedure (04/22/2019); and Urethra surgery.     Social History  He reports that he quit smoking about 4 weeks ago. His smoking use included cigarettes. He started smoking about 37 years ago. He has a 9.4 pack-year smoking history. He does not have any smokeless tobacco history on file. He reports current alcohol use. No history on file for drug use.    Family History  Family History   Problem Relation Name Age of Onset    Stroke Mother      Bipolar disorder Mother      Prostate cancer Father      Hypertension Sister      Hyperlipidemia  "Sister      Asthma Son      Colon cancer Paternal Grandfather          Allergies  Diazepam, Iodinated contrast media, Iodine, Zosyn [piperacillin-tazobactam], Acetaminophen, and Phenytoin sodium extended    Review of Systems   All other systems reviewed and are negative.      Last Recorded Vitals  Blood pressure 114/65, pulse 95, temperature 36.5 °C (97.7 °F), temperature source Temporal, resp. rate 16, height 1.626 m (5' 4\"), weight 71.6 kg (157 lb 13.6 oz), SpO2 98%.     Physical Exam  Vitals reviewed.   Constitutional:       General: He is not in acute distress.     Appearance: He is not toxic-appearing.      Interventions: Nasal cannula in place.   HENT:      Head: Normocephalic and atraumatic.      Nose: Nose normal.      Mouth/Throat:      Mouth: Mucous membranes are moist.      Pharynx: Oropharynx is clear.   Eyes:      General: No scleral icterus.     Conjunctiva/sclera: Conjunctivae normal.   Cardiovascular:      Rate and Rhythm: Normal rate and regular rhythm.   Pulmonary:      Effort: Pulmonary effort is normal.      Breath sounds: Examination of the left-lower field reveals decreased breath sounds. Decreased breath sounds present. No wheezing.   Chest:      Comments: Portable butterfly ultrasound probe utilized to scan left hemithorax.  Demonstrates large free-flowing noncomplex left pleural effusion  Abdominal:      General: A surgical scar is present.      Hernia: A hernia is present.   Skin:     General: Skin is warm.   Neurological:      General: No focal deficit present.      Mental Status: He is alert.      Cranial Nerves: Dysarthria present.   Psychiatric:         Mood and Affect: Mood normal.         Behavior: Behavior normal.         Meds  Scheduled medications  aspirin, 81 mg, oral, Daily  enoxaparin, 40 mg, subcutaneous, Daily  metoprolol tartrate, 25 mg, oral, BID      Continuous medications     PRN medications  PRN medications: ALPRAZolam, morphine, ondansetron **OR** ondansetron, " polyethylene glycol       Relevant Results  CBC and BMP reviewed  Elevated total bilirubin  Chest x-ray compared with 1 to 2 months ago.  I agree development of large left pleural effusion.  This also correlates with lung cuts on CT abdomen pelvis.      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  He is a usually on the right side with a small percentage can present on the left.  Given new disease, believe pleural drainage is indicated  To arrange for IR guided thoracentesis of left pleural effusion with sending for the usual studies including cultures.  Additionally cytology. briefly discussed procedure with patient and he is agreeable to proceed  Hypoxia: Potentially in the setting of restrictive dynamics from his abdominal issues as well as left-sided pleural effusion  Continue with supplemental oxygen  Incentive spirometry  Elevated LFTs  Defer this to general surgery and gastroenterology    Thank you for this consultation.  We will follow with you.   pulmonary available to assist if any questions over the weekend.  Please refer to on call schedule.       Elaine Wallace MD  Pulmonary/Critical Care Physician

## 2024-08-30 NOTE — NURSING NOTE
Patient tolerated procedure well. Bandaid to left back cdi and bandaid to right abd cdi. VSS. Holding lasix and aldcatone per Jerrica NP for systolic BP 90's. Call light within reach.

## 2024-08-31 LAB
ACID FAST STN SPEC: NORMAL
ANION GAP SERPL CALC-SCNC: 8 MMOL/L
BUN SERPL-MCNC: 5 MG/DL (ref 8–25)
CALCIUM SERPL-MCNC: 7.6 MG/DL (ref 8.5–10.4)
CHLORIDE SERPL-SCNC: 101 MMOL/L (ref 97–107)
CO2 SERPL-SCNC: 26 MMOL/L (ref 24–31)
CREAT SERPL-MCNC: 0.4 MG/DL (ref 0.4–1.6)
EGFRCR SERPLBLD CKD-EPI 2021: >90 ML/MIN/1.73M*2
ERYTHROCYTE [DISTWIDTH] IN BLOOD BY AUTOMATED COUNT: 19.3 % (ref 11.5–14.5)
GLUCOSE SERPL-MCNC: 100 MG/DL (ref 65–99)
HCT VFR BLD AUTO: 31.4 % (ref 41–52)
HGB BLD-MCNC: 10.8 G/DL (ref 13.5–17.5)
HOLD SPECIMEN: NORMAL
IRON SATN MFR SERPL: 45 % (ref 12–50)
IRON SERPL-MCNC: 35 UG/DL (ref 45–160)
MCH RBC QN AUTO: 35.3 PG (ref 26–34)
MCHC RBC AUTO-ENTMCNC: 34.4 G/DL (ref 32–36)
MCV RBC AUTO: 103 FL (ref 80–100)
NRBC BLD-RTO: 0 /100 WBCS (ref 0–0)
PLATELET # BLD AUTO: 105 X10*3/UL (ref 150–450)
POTASSIUM SERPL-SCNC: 3.7 MMOL/L (ref 3.4–5.1)
RBC # BLD AUTO: 3.06 X10*6/UL (ref 4.5–5.9)
SODIUM SERPL-SCNC: 135 MMOL/L (ref 133–145)
TIBC SERPL-MCNC: 77 UG/DL (ref 228–428)
UIBC SERPL-MCNC: 42 UG/DL (ref 110–370)
VIT B12 SERPL-MCNC: 1622 PG/ML (ref 211–946)
WBC # BLD AUTO: 6.1 X10*3/UL (ref 4.4–11.3)

## 2024-08-31 PROCEDURE — 2500000001 HC RX 250 WO HCPCS SELF ADMINISTERED DRUGS (ALT 637 FOR MEDICARE OP)

## 2024-08-31 PROCEDURE — 1200000002 HC GENERAL ROOM WITH TELEMETRY DAILY

## 2024-08-31 PROCEDURE — 36415 COLL VENOUS BLD VENIPUNCTURE: CPT | Performed by: NURSE PRACTITIONER

## 2024-08-31 PROCEDURE — 83540 ASSAY OF IRON: CPT | Performed by: INTERNAL MEDICINE

## 2024-08-31 PROCEDURE — 2500000001 HC RX 250 WO HCPCS SELF ADMINISTERED DRUGS (ALT 637 FOR MEDICARE OP): Performed by: NURSE PRACTITIONER

## 2024-08-31 PROCEDURE — 85027 COMPLETE CBC AUTOMATED: CPT | Performed by: NURSE PRACTITIONER

## 2024-08-31 PROCEDURE — 82607 VITAMIN B-12: CPT | Performed by: INTERNAL MEDICINE

## 2024-08-31 PROCEDURE — 82374 ASSAY BLOOD CARBON DIOXIDE: CPT | Performed by: NURSE PRACTITIONER

## 2024-08-31 ASSESSMENT — COGNITIVE AND FUNCTIONAL STATUS - GENERAL
HELP NEEDED FOR BATHING: A LOT
MOVING TO AND FROM BED TO CHAIR: A LITTLE
DAILY ACTIVITIY SCORE: 18
TOILETING: A LITTLE
STANDING UP FROM CHAIR USING ARMS: A LITTLE
CLIMB 3 TO 5 STEPS WITH RAILING: A LOT
TURNING FROM BACK TO SIDE WHILE IN FLAT BAD: A LITTLE
MOVING FROM LYING ON BACK TO SITTING ON SIDE OF FLAT BED WITH BEDRAILS: A LITTLE
WALKING IN HOSPITAL ROOM: A LOT
DRESSING REGULAR UPPER BODY CLOTHING: A LITTLE
PERSONAL GROOMING: A LITTLE
CLIMB 3 TO 5 STEPS WITH RAILING: A LITTLE
DRESSING REGULAR LOWER BODY CLOTHING: A LITTLE
MOVING TO AND FROM BED TO CHAIR: A LITTLE
MOBILITY SCORE: 16
MOVING FROM LYING ON BACK TO SITTING ON SIDE OF FLAT BED WITH BEDRAILS: A LITTLE
DRESSING REGULAR LOWER BODY CLOTHING: A LITTLE
TOILETING: A LITTLE
TURNING FROM BACK TO SIDE WHILE IN FLAT BAD: A LITTLE
PERSONAL GROOMING: A LITTLE
STANDING UP FROM CHAIR USING ARMS: A LITTLE
DAILY ACTIVITIY SCORE: 18
MOBILITY SCORE: 18
DRESSING REGULAR UPPER BODY CLOTHING: A LITTLE
EATING MEALS: A LITTLE
HELP NEEDED FOR BATHING: A LITTLE
WALKING IN HOSPITAL ROOM: A LITTLE

## 2024-08-31 ASSESSMENT — PAIN SCALES - GENERAL
PAINLEVEL_OUTOF10: 0 - NO PAIN
PAINLEVEL_OUTOF10: 0 - NO PAIN
PAINLEVEL_OUTOF10: 10 - WORST POSSIBLE PAIN
PAINLEVEL_OUTOF10: 0 - NO PAIN
PAINLEVEL_OUTOF10: 9

## 2024-08-31 ASSESSMENT — PAIN - FUNCTIONAL ASSESSMENT
PAIN_FUNCTIONAL_ASSESSMENT: FLACC (FACE, LEGS, ACTIVITY, CRY, CONSOLABILITY)
PAIN_FUNCTIONAL_ASSESSMENT: 0-10
PAIN_FUNCTIONAL_ASSESSMENT: 0-10

## 2024-08-31 ASSESSMENT — PAIN DESCRIPTION - DESCRIPTORS: DESCRIPTORS: ACHING

## 2024-08-31 NOTE — NURSING NOTE
Pt not wanting to get out of bed at this time, education provided regarding benefits of getting out of bed

## 2024-08-31 NOTE — PROGRESS NOTES
"Nils Hogan is a 54 y.o. male on day 2 of admission presenting with Generalized abdominal pain.    Subjective   Denies black or bloody stools. Abdominal pain baseline        Objective     Physical Exam  Vitals reviewed.   Constitutional:       General: He is awake.      Appearance: He is ill-appearing.   HENT:      Head: Normocephalic and atraumatic.      Mouth/Throat:      Mouth: Mucous membranes are moist.   Cardiovascular:      Rate and Rhythm: Normal rate and regular rhythm.   Pulmonary:      Effort: Pulmonary effort is normal.      Breath sounds: Normal breath sounds.   Abdominal:      General: There is distension.      Palpations: Abdomen is soft.      Tenderness: There is abdominal tenderness. There is no guarding.      Hernia: A hernia is present.   Musculoskeletal:      Cervical back: Normal range of motion and neck supple.   Skin:     General: Skin is warm and dry.   Neurological:      General: No focal deficit present.      Mental Status: He is alert and oriented to person, place, and time. Mental status is at baseline.   Psychiatric:         Attention and Perception: Attention and perception normal.         Mood and Affect: Mood normal.         Behavior: Behavior normal.         Last Recorded Vitals  Blood pressure 115/64, pulse 94, temperature 36.9 °C (98.4 °F), temperature source Oral, resp. rate 18, height 1.626 m (5' 4\"), weight 71.6 kg (157 lb 13.6 oz), SpO2 92%.  Intake/Output last 3 Shifts:  I/O last 3 completed shifts:  In: 640 (8.9 mL/kg) [P.O.:540; IV Piggyback:100]  Out: 751 (10.5 mL/kg) [Urine:751 (0.3 mL/kg/hr)]  Weight: 71.6 kg     Relevant Results                   Results for orders placed or performed during the hospital encounter of 08/29/24 (from the past 24 hour(s))   Urinalysis with Reflex Culture and Microscopic   Result Value Ref Range    Color, Urine Dark-Yellow Light-Yellow, Yellow, Dark-Yellow    Appearance, Urine Turbid (N) Clear    Specific Gravity, Urine 1.009 1.005 - 1.035 "    pH, Urine 6.0 5.0, 5.5, 6.0, 6.5, 7.0, 7.5, 8.0    Protein, Urine NEGATIVE NEGATIVE, 10 (TRACE), 20 (TRACE) mg/dL    Glucose, Urine Normal Normal mg/dL    Blood, Urine NEGATIVE NEGATIVE    Ketones, Urine 10 (1+) (A) NEGATIVE mg/dL    Bilirubin, Urine 1 (1+) (A) NEGATIVE    Urobilinogen, Urine 12 (3+) (A) Normal mg/dL    Nitrite, Urine NEGATIVE NEGATIVE    Leukocyte Esterase, Urine NEGATIVE NEGATIVE   Extra Urine Gray Tube   Result Value Ref Range    Extra Tube Hold for add-ons.    Lactate Dehydrogenase, Fluid   Result Value Ref Range    LD, Fluid 58 Not established. U/L   Glucose, Fluid   Result Value Ref Range    Glucose, Fluid 102 Not established mg/dL   Protein, Total Fluid   Result Value Ref Range    Protein, Total Fluid 1.6 Not established g/dL   Body Fluid Cell Count   Result Value Ref Range    Color, Fluid Yellow Colorless, Straw, Yellow    Clarity, Fluid Hazy (A) Clear    WBC, Fluid 238 See Comment /uL    RBC, Fluid 2,000 0  /uL /uL   Body Fluid Differential   Result Value Ref Range    Neutrophils %, Manual, Fluid 5 <25 % %    Lymphocytes %, Manual, Fluid 23 <75 % %    Mono/Macrophages %, Manual, Fluid 70 <70 % %    Eosinophils %, Manual, Fluid 0 0 % %    Basophils %, Manual, Fluid 0 0 % %    Immature Granulocytes %, Manual, Fluid 0 0 % %    Blasts %, Manual, Fluid 0 0 % %    Unclassified Cells %, Manual, Fluid 2 (H) 0 % %    Plasma Cells %, Manual, Fluid 0 0 % %    Total Cells Counted, Fluid 100    Lavender Top   Result Value Ref Range    Extra Tube Hold for add-ons.    Sterile Fluid Culture/Smear    Specimen: Pleural; Fluid   Result Value Ref Range    Gram Stain No polymorphonuclear leukocytes seen     Gram Stain No organisms seen    AFB Processed   Result Value Ref Range    Extra Tube Hold for add-ons.    pH, Body Fluid   Result Value Ref Range    pH, Fluid 7.20 See Below   Protime-INR   Result Value Ref Range    Protime 20.1 (H) 9.3 - 12.7 seconds    INR 2.0 (H) 0.9 - 1.2   Alpha-Fetoprotein   Result  Value Ref Range    Alpha-Fetoprotein <4 0 - 9 ng/mL   CBC   Result Value Ref Range    WBC 6.1 4.4 - 11.3 x10*3/uL    nRBC 0.0 0.0 - 0.0 /100 WBCs    RBC 3.06 (L) 4.50 - 5.90 x10*6/uL    Hemoglobin 10.8 (L) 13.5 - 17.5 g/dL    Hematocrit 31.4 (L) 41.0 - 52.0 %     (H) 80 - 100 fL    MCH 35.3 (H) 26.0 - 34.0 pg    MCHC 34.4 32.0 - 36.0 g/dL    RDW 19.3 (H) 11.5 - 14.5 %    Platelets 105 (L) 150 - 450 x10*3/uL   Basic metabolic panel   Result Value Ref Range    Glucose 100 (H) 65 - 99 mg/dL    Sodium 135 133 - 145 mmol/L    Potassium 3.7 3.4 - 5.1 mmol/L    Chloride 101 97 - 107 mmol/L    Bicarbonate 26 24 - 31 mmol/L    Urea Nitrogen 5 (L) 8 - 25 mg/dL    Creatinine 0.40 0.40 - 1.60 mg/dL    eGFR >90 >60 mL/min/1.73m*2    Calcium 7.6 (L) 8.5 - 10.4 mg/dL    Anion Gap 8 <=19 mmol/L     XR chest 1 view    Result Date: 8/29/2024  Interpreted By:  Holden Salinas, STUDY: XR CHEST 1 VIEW;  8/29/2024 6:04 pm   INDICATION: Signs/Symptoms:evaluate for pleural effusion seen on ct abd.   COMPARISON: Chest radiograph 06/08/2024   ACCESSION NUMBER(S): [id number]   ORDERING CLINICIAN: DILLON KUNZ   FINDINGS:     CARDIOMEDIASTINAL SILHOUETTE: Cardiomediastinal silhouette is stable in size and configuration.   LUNGS: Large left-sided pleural effusion with left lower lung airspace opacification.   ABDOMEN: No remarkable upper abdominal findings.   BONES: No acute osseous changes.       1.  Large left-sided pleural effusion.     Signed by: Holden Salinas 8/29/2024 6:13 PM Dictation workstation:   [id number]    CT abdomen pelvis wo IV contrast    Result Date: 8/29/2024  Interpreted By:  Antoniou, Tamir, STUDY: CT ABDOMEN PELVIS WO IV CONTRAST; 8/29/2024 3:42 pm   INDICATION: Signs/Symptoms:abdominal pain, hx of ascites   COMPARISON: 06/05/2024.   ACCESSION NUMBER(S): [id number]   ORDERING CLINICIAN: SURINDER LAZO   TECHNIQUE: Spiral axial unenhanced images were obtained from the upper abdomen to the symphysis pubis. Oral  contrast was not administered.   All CT examinations are performed with one or more of the following dose reduction techniques: Automated Exposure Control, adjustment of mA and/or kV according to patient size, or use of iterative reconstruction techniques.   FINDINGS: Lung bases: There is interval significant worsening of large left pleural effusion with left basilar atelectatic changes/infiltrates. Liver: Heterogenous cirrhotic liver with vague hypodensity again noted in the left lobe, similar to prior exam. Mild pneumobilia again seen. Associated worsening ascites in the abdomen and pelvis. Spleen: Splenomegaly again noted, without focal lesions. Pancreas: Previous Whipple's procedure, similar to prior exam. Associated prominent peripancreatic/periportal lymph nodes, similar to prior exam. Adrenal glands: No focal lesions. The kidneys are normal in size and position. There are no renal calculi or hydronephrosis. No abnormal calcifications are seen within the course of the ureters or within the partially distended bladder. Pelvic reproductive organs: Small calcifications again seen in the prostate gland.   Several colonic diverticula are again seen, without acute diverticulitis. There is again large ventral hernia containing several small bowel loops, without strangulation or bowel obstruction. Atherosclerotic calcifications involve the aorta and its branches, without focal aneurysm. Degenerative changes involve the L5-S1 level of the lumbar spine.       1. Interval significant worsening of large left pleural effusion left basilar infiltrates/atelectasis; correlate clinically and follow-up as needed. 2. Heterogenous cirrhotic liver with pneumobilia and stable hypodense lesion in the left lobe. Splenomegaly. Worsening ascites in the abdomen and pelvis. 3.  previous Whipple's procedure. Associated prominent peripancreatic/periportal lymph nodes. 4. Large ventral hernia again seen, containing several small bowel  loops, without strangulation or bowel obstruction. Colonic diverticulosis without acute diverticulitis.   Signed by: Tamir Luevano 8/29/2024 4:15 PM Dictation workstation:   [id number]              Assessment/Plan   Assessment & Plan  Generalized abdominal pain    Alcoholic Cirrhosis hx ETOH abuse. (Sober 2 years) MELD 22 decomp   HCC- AFP normal   HE: A/O x 3. No indication for Lactulose at this time   EV:  last EGD 2019. No varices. No hematemesis. Recommend EGD at some point for surviallence   Ascites: s/p paracentesis with 1.5 L removed. Neg SBP    -Low Na diet    -Continue Lasix and Aldactone      Anemia  Macrocytic anemia hgb 10.8  Reports of dark stool, however he was on Pepto-bismol which turn stool dark.  No urgent indication for scopes   High dose PPI   Add Carafate    H/H stable   - Monitor  CBC, CMP, INR,  - Transfuse to keep hgb >7    Ultimately, recommend outpatient EGD        I spent 20 minutes in the professional and overall care of this patient.      Kate Hemphill, APRN-CNP

## 2024-08-31 NOTE — PROGRESS NOTES
Nils Hogan is a 54 y.o. male on day 2 of admission presenting with Generalized abdominal pain.      Subjective   Abdominal pain less, wants hernia repair       Objective     Last Recorded Vitals  /64 (BP Location: Left arm, Patient Position: Lying)   Pulse 93   Temp 36.4 °C (97.5 °F) (Temporal)   Resp 16   Wt 71.6 kg (157 lb 13.6 oz)   SpO2 95%   Intake/Output last 3 Shifts:    Intake/Output Summary (Last 24 hours) at 8/31/2024 1821  Last data filed at 8/31/2024 1813  Gross per 24 hour   Intake 480 ml   Output 400 ml   Net 80 ml       Admission Weight  Weight: 71 kg (156 lb 8.4 oz) (08/29/24 1424)    Daily Weight  08/30/24 : 71.6 kg (157 lb 13.6 oz)    Image Results  XR chest 1 view  Narrative: Interpreted By:  Holden Salinas,   STUDY:  XR CHEST 1 VIEW;  8/29/2024 6:04 pm      INDICATION:  Signs/Symptoms:evaluate for pleural effusion seen on ct abd.      COMPARISON:  Chest radiograph 06/08/2024      ACCESSION NUMBER(S):  [id number]      ORDERING CLINICIAN:  DILLON KUNZ      FINDINGS:          CARDIOMEDIASTINAL SILHOUETTE:  Cardiomediastinal silhouette is stable in size and configuration.      LUNGS:  Large left-sided pleural effusion with left lower lung airspace  opacification.      ABDOMEN:  No remarkable upper abdominal findings.      BONES:  No acute osseous changes.      Impression: 1.  Large left-sided pleural effusion.          Signed by: Holden Salinas 8/29/2024 6:13 PM  Dictation workstation:   [id number]  CT abdomen pelvis wo IV contrast  Narrative: Interpreted By:  Tamir Luevano,   STUDY:  CT ABDOMEN PELVIS WO IV CONTRAST; 8/29/2024 3:42 pm      INDICATION:  Signs/Symptoms:abdominal pain, hx of ascites      COMPARISON:  06/05/2024.      ACCESSION NUMBER(S):  [id number]      ORDERING CLINICIAN:  SURINDER LAZO      TECHNIQUE:  Spiral axial unenhanced images were obtained from the upper abdomen  to the symphysis pubis. Oral contrast was not administered.      All CT examinations are  performed with one or more of the following  dose reduction techniques: Automated Exposure Control, adjustment of  mA and/or kV according to patient size, or use of iterative  reconstruction techniques.      FINDINGS:  Lung bases: There is interval significant worsening of large left  pleural effusion with left basilar atelectatic changes/infiltrates.  Liver: Heterogenous cirrhotic liver with vague hypodensity again  noted in the left lobe, similar to prior exam. Mild pneumobilia again  seen. Associated worsening ascites in the abdomen and pelvis.  Spleen: Splenomegaly again noted, without focal lesions.  Pancreas: Previous Whipple's procedure, similar to prior exam.  Associated prominent peripancreatic/periportal lymph nodes, similar  to prior exam. Adrenal glands: No focal lesions.  The kidneys are normal in size and position.  There are no renal calculi or hydronephrosis.  No abnormal calcifications are seen within the course of the ureters  or within the partially distended bladder. Pelvic reproductive  organs: Small calcifications again seen in the prostate gland.      Several colonic diverticula are again seen, without acute  diverticulitis. There is again large ventral hernia containing  several small bowel loops, without strangulation or bowel  obstruction. Atherosclerotic calcifications involve the aorta and its  branches, without focal aneurysm. Degenerative changes involve the  L5-S1 level of the lumbar spine.      Impression: 1. Interval significant worsening of large left pleural effusion left  basilar infiltrates/atelectasis; correlate clinically and follow-up  as needed.  2. Heterogenous cirrhotic liver with pneumobilia and stable hypodense  lesion in the left lobe. Splenomegaly. Worsening ascites in the  abdomen and pelvis.  3.  previous Whipple's procedure. Associated prominent  peripancreatic/periportal lymph nodes.  4. Large ventral hernia again seen, containing several small bowel  loops, without  strangulation or bowel obstruction. Colonic  diverticulosis without acute diverticulitis.      Signed by: Tamir Luevano 8/29/2024 4:15 PM  Dictation workstation:   [id number]      Physical Exam/less distenssion abdomen    Relevant Results               Assessment/Plan                  Assessment & Plan  Generalized abdominal pain    Hernia abdominal wall, anemia              Barbara Brunson MD

## 2024-08-31 NOTE — NURSING NOTE
Took over care of pt at this time. Pt laying in bed, alert, aox3, speech garbled at times. Pt has no complaints of pain and has no other needs or complaints at this time. Umbilical abdomen dressing and bandaid to RLQ clean/dry/intact. Pt oriented to call bell and it and belongings are placed in reach. Telemetry leads connected and reading on monitor. Bed alarm set. NC O2 in place and connected to flowmeter. Continuing to monitor.

## 2024-08-31 NOTE — CARE PLAN
The patient's goals for the shift include have little to no pain    The clinical goals for the shift include manage pain    Over the shift, the patient did not make progress toward the following goals. Barriers to progression include pain to abdominal area. Recommendations to address these barriers include pain medications and repositioning.

## 2024-09-01 VITALS
BODY MASS INDEX: 26.95 KG/M2 | OXYGEN SATURATION: 97 % | TEMPERATURE: 98.2 F | SYSTOLIC BLOOD PRESSURE: 104 MMHG | HEART RATE: 88 BPM | RESPIRATION RATE: 16 BRPM | WEIGHT: 157.85 LBS | HEIGHT: 64 IN | DIASTOLIC BLOOD PRESSURE: 59 MMHG

## 2024-09-01 LAB
ANION GAP SERPL CALC-SCNC: 9 MMOL/L
BACTERIA FLD CULT: NORMAL
BUN SERPL-MCNC: 4 MG/DL (ref 8–25)
CALCIUM SERPL-MCNC: 7.7 MG/DL (ref 8.5–10.4)
CHLORIDE SERPL-SCNC: 99 MMOL/L (ref 97–107)
CO2 SERPL-SCNC: 26 MMOL/L (ref 24–31)
CREAT SERPL-MCNC: 0.4 MG/DL (ref 0.4–1.6)
EGFRCR SERPLBLD CKD-EPI 2021: >90 ML/MIN/1.73M*2
ERYTHROCYTE [DISTWIDTH] IN BLOOD BY AUTOMATED COUNT: 19.1 % (ref 11.5–14.5)
FUNGUS SPEC FUNGUS STN: NORMAL
GLUCOSE SERPL-MCNC: 93 MG/DL (ref 65–99)
GRAM STN SPEC: NORMAL
GRAM STN SPEC: NORMAL
HCT VFR BLD AUTO: 36.9 % (ref 41–52)
HGB BLD-MCNC: 12.6 G/DL (ref 13.5–17.5)
MCH RBC QN AUTO: 35.3 PG (ref 26–34)
MCHC RBC AUTO-ENTMCNC: 34.1 G/DL (ref 32–36)
MCV RBC AUTO: 103 FL (ref 80–100)
NRBC BLD-RTO: 0 /100 WBCS (ref 0–0)
PLATELET # BLD AUTO: 96 X10*3/UL (ref 150–450)
POTASSIUM SERPL-SCNC: 3.6 MMOL/L (ref 3.4–5.1)
RBC # BLD AUTO: 3.57 X10*6/UL (ref 4.5–5.9)
SODIUM SERPL-SCNC: 134 MMOL/L (ref 133–145)
WBC # BLD AUTO: 5.8 X10*3/UL (ref 4.4–11.3)

## 2024-09-01 PROCEDURE — 80048 BASIC METABOLIC PNL TOTAL CA: CPT | Performed by: NURSE PRACTITIONER

## 2024-09-01 PROCEDURE — 1200000002 HC GENERAL ROOM WITH TELEMETRY DAILY

## 2024-09-01 PROCEDURE — 36415 COLL VENOUS BLD VENIPUNCTURE: CPT | Performed by: NURSE PRACTITIONER

## 2024-09-01 PROCEDURE — 2500000004 HC RX 250 GENERAL PHARMACY W/ HCPCS (ALT 636 FOR OP/ED): Performed by: INTERNAL MEDICINE

## 2024-09-01 PROCEDURE — 2500000001 HC RX 250 WO HCPCS SELF ADMINISTERED DRUGS (ALT 637 FOR MEDICARE OP)

## 2024-09-01 PROCEDURE — 85027 COMPLETE CBC AUTOMATED: CPT | Performed by: NURSE PRACTITIONER

## 2024-09-01 PROCEDURE — 2500000004 HC RX 250 GENERAL PHARMACY W/ HCPCS (ALT 636 FOR OP/ED): Performed by: NURSE PRACTITIONER

## 2024-09-01 PROCEDURE — 2500000001 HC RX 250 WO HCPCS SELF ADMINISTERED DRUGS (ALT 637 FOR MEDICARE OP): Performed by: NURSE PRACTITIONER

## 2024-09-01 RX ORDER — MIRTAZAPINE 15 MG/1
15 TABLET, FILM COATED ORAL NIGHTLY
Status: DISPENSED | OUTPATIENT
Start: 2024-09-01

## 2024-09-01 RX ORDER — VALPROIC ACID 250 MG/1
250 CAPSULE, LIQUID FILLED ORAL EVERY 8 HOURS SCHEDULED
Status: DISCONTINUED | OUTPATIENT
Start: 2024-09-01 | End: 2024-09-01

## 2024-09-01 RX ORDER — PANTOPRAZOLE SODIUM 40 MG/1
40 TABLET, DELAYED RELEASE ORAL
Status: ACTIVE | OUTPATIENT
Start: 2024-09-02

## 2024-09-01 RX ORDER — PANTOPRAZOLE SODIUM 40 MG/10ML
40 INJECTION, POWDER, LYOPHILIZED, FOR SOLUTION INTRAVENOUS DAILY
Status: DISCONTINUED | OUTPATIENT
Start: 2024-09-01 | End: 2024-09-01 | Stop reason: SDUPTHER

## 2024-09-01 ASSESSMENT — COGNITIVE AND FUNCTIONAL STATUS - GENERAL
TURNING FROM BACK TO SIDE WHILE IN FLAT BAD: A LOT
HELP NEEDED FOR BATHING: A LITTLE
CLIMB 3 TO 5 STEPS WITH RAILING: A LITTLE
DRESSING REGULAR UPPER BODY CLOTHING: A LITTLE
PERSONAL GROOMING: A LITTLE
MOVING FROM LYING ON BACK TO SITTING ON SIDE OF FLAT BED WITH BEDRAILS: A LITTLE
CLIMB 3 TO 5 STEPS WITH RAILING: A LITTLE
STANDING UP FROM CHAIR USING ARMS: A LITTLE
MOBILITY SCORE: 17
DAILY ACTIVITIY SCORE: 19
TOILETING: A LITTLE
PERSONAL GROOMING: A LITTLE
HELP NEEDED FOR BATHING: A LITTLE
DRESSING REGULAR LOWER BODY CLOTHING: A LITTLE
WALKING IN HOSPITAL ROOM: A LITTLE
DRESSING REGULAR UPPER BODY CLOTHING: A LITTLE
STANDING UP FROM CHAIR USING ARMS: A LITTLE
MOVING TO AND FROM BED TO CHAIR: A LITTLE
WALKING IN HOSPITAL ROOM: A LITTLE
MOVING FROM LYING ON BACK TO SITTING ON SIDE OF FLAT BED WITH BEDRAILS: A LITTLE
DRESSING REGULAR LOWER BODY CLOTHING: A LITTLE
TURNING FROM BACK TO SIDE WHILE IN FLAT BAD: A LOT
DAILY ACTIVITIY SCORE: 18
EATING MEALS: A LITTLE
TOILETING: A LITTLE
MOBILITY SCORE: 17
MOVING TO AND FROM BED TO CHAIR: A LITTLE

## 2024-09-01 ASSESSMENT — PAIN SCALES - GENERAL
PAINLEVEL_OUTOF10: 0 - NO PAIN
PAINLEVEL_OUTOF10: 4
PAINLEVEL_OUTOF10: 0 - NO PAIN
PAINLEVEL_OUTOF10: 0 - NO PAIN
PAINLEVEL_OUTOF10: 7
PAINLEVEL_OUTOF10: 0 - NO PAIN
PAINLEVEL_OUTOF10: 7

## 2024-09-01 ASSESSMENT — ENCOUNTER SYMPTOMS
RESPIRATORY NEGATIVE: 1
ABDOMINAL PAIN: 1
ENDOCRINE NEGATIVE: 1
EYES NEGATIVE: 1
MUSCULOSKELETAL NEGATIVE: 1
APPETITE CHANGE: 1
AGITATION: 1
HEMATOLOGIC/LYMPHATIC NEGATIVE: 1
FATIGUE: 1
NEUROLOGICAL NEGATIVE: 1
UNEXPECTED WEIGHT CHANGE: 1
ALLERGIC/IMMUNOLOGIC NEGATIVE: 1
ACTIVITY CHANGE: 1
CARDIOVASCULAR NEGATIVE: 1
NERVOUS/ANXIOUS: 1

## 2024-09-01 ASSESSMENT — PAIN - FUNCTIONAL ASSESSMENT
PAIN_FUNCTIONAL_ASSESSMENT: 0-10
PAIN_FUNCTIONAL_ASSESSMENT: 0-10
PAIN_FUNCTIONAL_ASSESSMENT: FLACC (FACE, LEGS, ACTIVITY, CRY, CONSOLABILITY)
PAIN_FUNCTIONAL_ASSESSMENT: FLACC (FACE, LEGS, ACTIVITY, CRY, CONSOLABILITY)
PAIN_FUNCTIONAL_ASSESSMENT: 0-10
PAIN_FUNCTIONAL_ASSESSMENT: FLACC (FACE, LEGS, ACTIVITY, CRY, CONSOLABILITY)

## 2024-09-01 ASSESSMENT — PAIN DESCRIPTION - DESCRIPTORS
DESCRIPTORS: ACHING

## 2024-09-01 NOTE — NURSING NOTE
Rounded on pt. Pt laying in bed, eyes closed, respirations even and unlabored. Pt appears to be sleeping and in no apparent pain or distress. Abdomen dressing unchanged since prior assessment.Call bell and belongings are placed in reach. Telemetry leads connected and reading on monitor. NC O2 in place and connected to flowmeter. Bed alarm set. Continuing to monitor.

## 2024-09-01 NOTE — NURSING NOTE
Rounded on pt. Pt laying in bed, eyes closed, respirations even and unlabored. Pt appears to be sleeping and in no apparent pain or distress. Abdomen dressing unchanged since prior assessment. Uneventful night with no changes since prior assessment.  Call bell and belongings are placed in reach. Telemetry leads connected and reading on monitor. NC O2 in place and connected to flowmeter. Bed alarm set. Continuing to monitor.

## 2024-09-01 NOTE — PROGRESS NOTES
Nils Hogan is a 54 y.o. male on day 3 of admission presenting with Generalized abdominal pain.      Subjective   Better today       Objective     Last Recorded Vitals  /52 (BP Location: Left arm, Patient Position: Lying)   Pulse 84   Temp 36.1 °C (97 °F) (Temporal)   Resp 16   Wt 71.6 kg (157 lb 13.6 oz)   SpO2 97%   Intake/Output last 3 Shifts:    Intake/Output Summary (Last 24 hours) at 9/1/2024 0847  Last data filed at 8/31/2024 2300  Gross per 24 hour   Intake 460 ml   Output 200 ml   Net 260 ml       Admission Weight  Weight: 71 kg (156 lb 8.4 oz) (08/29/24 1424)    Daily Weight  09/01/24 : 71.6 kg (157 lb 13.6 oz)    Image Results  XR chest 1 view  Narrative: Interpreted By:  Holden Salinas,   STUDY:  XR CHEST 1 VIEW;  8/29/2024 6:04 pm      INDICATION:  Signs/Symptoms:evaluate for pleural effusion seen on ct abd.      COMPARISON:  Chest radiograph 06/08/2024      ACCESSION NUMBER(S):  [id number]      ORDERING CLINICIAN:  DILLON KUNZ      FINDINGS:          CARDIOMEDIASTINAL SILHOUETTE:  Cardiomediastinal silhouette is stable in size and configuration.      LUNGS:  Large left-sided pleural effusion with left lower lung airspace  opacification.      ABDOMEN:  No remarkable upper abdominal findings.      BONES:  No acute osseous changes.      Impression: 1.  Large left-sided pleural effusion.          Signed by: Holden Salinas 8/29/2024 6:13 PM  Dictation workstation:   [id number]  CT abdomen pelvis wo IV contrast  Narrative: Interpreted By:  Tamir Luevano,   STUDY:  CT ABDOMEN PELVIS WO IV CONTRAST; 8/29/2024 3:42 pm      INDICATION:  Signs/Symptoms:abdominal pain, hx of ascites      COMPARISON:  06/05/2024.      ACCESSION NUMBER(S):  [id number]      ORDERING CLINICIAN:  SURINDER LAZO      TECHNIQUE:  Spiral axial unenhanced images were obtained from the upper abdomen  to the symphysis pubis. Oral contrast was not administered.      All CT examinations are performed with one or more of  the following  dose reduction techniques: Automated Exposure Control, adjustment of  mA and/or kV according to patient size, or use of iterative  reconstruction techniques.      FINDINGS:  Lung bases: There is interval significant worsening of large left  pleural effusion with left basilar atelectatic changes/infiltrates.  Liver: Heterogenous cirrhotic liver with vague hypodensity again  noted in the left lobe, similar to prior exam. Mild pneumobilia again  seen. Associated worsening ascites in the abdomen and pelvis.  Spleen: Splenomegaly again noted, without focal lesions.  Pancreas: Previous Whipple's procedure, similar to prior exam.  Associated prominent peripancreatic/periportal lymph nodes, similar  to prior exam. Adrenal glands: No focal lesions.  The kidneys are normal in size and position.  There are no renal calculi or hydronephrosis.  No abnormal calcifications are seen within the course of the ureters  or within the partially distended bladder. Pelvic reproductive  organs: Small calcifications again seen in the prostate gland.      Several colonic diverticula are again seen, without acute  diverticulitis. There is again large ventral hernia containing  several small bowel loops, without strangulation or bowel  obstruction. Atherosclerotic calcifications involve the aorta and its  branches, without focal aneurysm. Degenerative changes involve the  L5-S1 level of the lumbar spine.      Impression: 1. Interval significant worsening of large left pleural effusion left  basilar infiltrates/atelectasis; correlate clinically and follow-up  as needed.  2. Heterogenous cirrhotic liver with pneumobilia and stable hypodense  lesion in the left lobe. Splenomegaly. Worsening ascites in the  abdomen and pelvis.  3.  previous Whipple's procedure. Associated prominent  peripancreatic/periportal lymph nodes.  4. Large ventral hernia again seen, containing several small bowel  loops, without strangulation or bowel  obstruction. Colonic  diverticulosis without acute diverticulitis.      Signed by: Tamir Luevano 8/29/2024 4:15 PM  Dictation workstation:   [id number]      Physical Exam/wound abdominal wall size og a quarter, superficial healing    Relevant Results               Assessment/Plan                  Assessment & Plan  Generalized abdominal pain    Abdominal wall hernia, pain better, cirrhosis liver              Barbara Brunson MD

## 2024-09-01 NOTE — NURSING NOTE
Attempted to give IVP venofer of 10 ml. Pt received 2 ml in 2 minutes and he became hot and flushed and started to dry heave. Did not give remaining 8 ml due to pt's reaction.

## 2024-09-01 NOTE — H&P
History Of Present Illness  Nils Hogan is a 54 y.o. male presenting with abdominal pain uncontrolled, weight loss ,unable to eat, distended GB..     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    Surgical History  He has a past surgical history that includes Adenoidectomy; Whipple procedure (04/22/2019); and Urethra surgery.     Social History  He reports that he quit smoking about 4 weeks ago. His smoking use included cigarettes. He started smoking about 37 years ago. He has a 9.4 pack-year smoking history. He does not have any smokeless tobacco history on file. He reports current alcohol use. No history on file for drug use.    Family History  Family History   Problem Relation Name Age of Onset    Stroke Mother      Bipolar disorder Mother      Prostate cancer Father      Hypertension Sister      Hyperlipidemia Sister      Asthma Son      Colon cancer Paternal Grandfather          Allergies  Diazepam, Iodinated contrast media, Iodine, Zosyn [piperacillin-tazobactam], Acetaminophen, and Phenytoin sodium extended    Review of Systems   Constitutional:  Positive for activity change, appetite change, fatigue and unexpected weight change.   HENT: Negative.     Eyes: Negative.    Respiratory: Negative.     Cardiovascular: Negative.    Gastrointestinal:  Positive for abdominal pain.   Endocrine: Negative.    Genitourinary: Negative.    Musculoskeletal: Negative.    Skin: Negative.    Allergic/Immunologic: Negative.    Neurological: Negative.    Hematological: Negative.    Psychiatric/Behavioral:  Positive for agitation. The patient is nervous/anxious.         Physical Exam  Constitutional:       Appearance: He is ill-appearing.   HENT:      Head: Normocephalic and atraumatic.      Nose: Nose normal.   Eyes:       Extraocular Movements: Extraocular movements intact.      Conjunctiva/sclera: Conjunctivae normal.      Pupils: Pupils are equal, round, and reactive to light.   Cardiovascular:      Rate and Rhythm: Normal rate and regular rhythm.      Pulses: Normal pulses.      Heart sounds: Normal heart sounds.   Pulmonary:      Effort: Pulmonary effort is normal.      Breath sounds: Normal breath sounds.   Abdominal:      General: Bowel sounds are normal.      Palpations: Abdomen is soft.      Tenderness: There is abdominal tenderness.   Musculoskeletal:         General: Normal range of motion.      Cervical back: Normal range of motion and neck supple.   Skin:     General: Skin is warm.   Neurological:      General: No focal deficit present.   Psychiatric:      Comments: Anxious , agitated, in pain          Last Recorded Vitals  /52 (BP Location: Left arm, Patient Position: Lying)   Pulse 84   Temp 36.1 °C (97 °F) (Temporal)   Resp 16   Wt 71.6 kg (157 lb 13.6 oz)   SpO2 97%     Relevant Results             Assessment/Plan   Assessment & Plan  Generalized abdominal pain      Distended gb, not controlled on morphine , HIDA scan, Emotional pain?       Barbara Brunson MD

## 2024-09-01 NOTE — CARE PLAN
The patient's goals for the shift include Really wants to eat...asks for order    The clinical goals for the shift include increase activity    Over the shift, the patient did not make progress toward the following goals. Barriers to progression include . Recommendations to address these barriers include .

## 2024-09-02 ENCOUNTER — APPOINTMENT (OUTPATIENT)
Dept: RADIOLOGY | Facility: HOSPITAL | Age: 54
End: 2024-09-02
Payer: COMMERCIAL

## 2024-09-02 LAB
BACTERIA FLD CULT: NORMAL
GRAM STN SPEC: NORMAL
GRAM STN SPEC: NORMAL

## 2024-09-02 PROCEDURE — 2500000001 HC RX 250 WO HCPCS SELF ADMINISTERED DRUGS (ALT 637 FOR MEDICARE OP): Performed by: INTERNAL MEDICINE

## 2024-09-02 PROCEDURE — 71250 CT THORAX DX C-: CPT

## 2024-09-02 PROCEDURE — 2500000001 HC RX 250 WO HCPCS SELF ADMINISTERED DRUGS (ALT 637 FOR MEDICARE OP)

## 2024-09-02 PROCEDURE — 2500000001 HC RX 250 WO HCPCS SELF ADMINISTERED DRUGS (ALT 637 FOR MEDICARE OP): Performed by: NURSE PRACTITIONER

## 2024-09-02 PROCEDURE — 99232 SBSQ HOSP IP/OBS MODERATE 35: CPT | Performed by: INTERNAL MEDICINE

## 2024-09-02 PROCEDURE — 1200000002 HC GENERAL ROOM WITH TELEMETRY DAILY

## 2024-09-02 ASSESSMENT — PAIN DESCRIPTION - DESCRIPTORS
DESCRIPTORS: ACHING

## 2024-09-02 ASSESSMENT — PAIN SCALES - GENERAL
PAINLEVEL_OUTOF10: 3
PAINLEVEL_OUTOF10: 0 - NO PAIN
PAINLEVEL_OUTOF10: 7
PAINLEVEL_OUTOF10: 0 - NO PAIN
PAINLEVEL_OUTOF10: 4
PAINLEVEL_OUTOF10: 0 - NO PAIN
PAINLEVEL_OUTOF10: 0 - NO PAIN

## 2024-09-02 ASSESSMENT — COGNITIVE AND FUNCTIONAL STATUS - GENERAL
CLIMB 3 TO 5 STEPS WITH RAILING: A LITTLE
WALKING IN HOSPITAL ROOM: A LITTLE
PERSONAL GROOMING: A LITTLE
MOVING TO AND FROM BED TO CHAIR: A LITTLE
TOILETING: A LITTLE
MOVING TO AND FROM BED TO CHAIR: A LITTLE
MOBILITY SCORE: 17
MOVING FROM LYING ON BACK TO SITTING ON SIDE OF FLAT BED WITH BEDRAILS: A LITTLE
PERSONAL GROOMING: A LITTLE
STANDING UP FROM CHAIR USING ARMS: A LITTLE
DAILY ACTIVITIY SCORE: 19
DAILY ACTIVITIY SCORE: 19
DRESSING REGULAR UPPER BODY CLOTHING: A LITTLE
TURNING FROM BACK TO SIDE WHILE IN FLAT BAD: A LOT
WALKING IN HOSPITAL ROOM: A LITTLE
HELP NEEDED FOR BATHING: A LITTLE
TOILETING: A LITTLE
DRESSING REGULAR LOWER BODY CLOTHING: A LITTLE
TURNING FROM BACK TO SIDE WHILE IN FLAT BAD: A LOT
DRESSING REGULAR LOWER BODY CLOTHING: A LITTLE
STANDING UP FROM CHAIR USING ARMS: A LITTLE
CLIMB 3 TO 5 STEPS WITH RAILING: A LITTLE
HELP NEEDED FOR BATHING: A LITTLE
MOBILITY SCORE: 17
MOVING FROM LYING ON BACK TO SITTING ON SIDE OF FLAT BED WITH BEDRAILS: A LITTLE
DRESSING REGULAR UPPER BODY CLOTHING: A LITTLE

## 2024-09-02 ASSESSMENT — PAIN - FUNCTIONAL ASSESSMENT
PAIN_FUNCTIONAL_ASSESSMENT: 0-10
PAIN_FUNCTIONAL_ASSESSMENT: 0-10
PAIN_FUNCTIONAL_ASSESSMENT: FLACC (FACE, LEGS, ACTIVITY, CRY, CONSOLABILITY)
PAIN_FUNCTIONAL_ASSESSMENT: 0-10
PAIN_FUNCTIONAL_ASSESSMENT: FLACC (FACE, LEGS, ACTIVITY, CRY, CONSOLABILITY)
PAIN_FUNCTIONAL_ASSESSMENT: 0-10
PAIN_FUNCTIONAL_ASSESSMENT: FLACC (FACE, LEGS, ACTIVITY, CRY, CONSOLABILITY)

## 2024-09-02 NOTE — NURSING NOTE
Took over care of pt at this time. Pt laying in bed, alert, aox3, speech garbled at times. Pt has no complaints of pain and has no other needs or complaints at this time. Umbilical abdomen dressing clean/dry/intact. Pt oriented to call bell and it and belongings are placed in reach. Telemetry leads connected and reading on monitor. Bed alarm set. Continuous pulse ox placed about this time and showed pt at a steady 88% on RA. Pt coughed a couple seconds and reached 90% and held steady there. I put pt back on 2L O2 NC. NC O2 in place and connected to flowmeter. Continuing to monitor.

## 2024-09-02 NOTE — NURSING NOTE
Dr. Travis Salgado MD:  pulmonary     [phone]    Barry (pt sister and POA)  [phone]  Also has privacy code

## 2024-09-02 NOTE — PROGRESS NOTES
Nils Hogan is a 54 y.o. male on day 4 of admission presenting with Generalized abdominal pain.      Subjective   Hypoxic today, dr Villegas       Objective     Last Recorded Vitals  /63 (BP Location: Left arm, Patient Position: Lying)   Pulse 97   Temp 36.8 °C (98.2 °F) (Temporal)   Resp 16   Wt 74.1 kg (163 lb 5.8 oz)   SpO2 98%   Intake/Output last 3 Shifts:    Intake/Output Summary (Last 24 hours) at 9/2/2024 0859  Last data filed at 9/2/2024 0630  Gross per 24 hour   Intake 140 ml   Output 875 ml   Net -735 ml       Admission Weight  Weight: 71 kg (156 lb 8.4 oz) (08/29/24 1424)    Daily Weight  09/02/24 : 74.1 kg (163 lb 5.8 oz)    Image Results  XR chest 1 view  Narrative: Interpreted By:  Holden Salinas,   STUDY:  XR CHEST 1 VIEW;  8/29/2024 6:04 pm      INDICATION:  Signs/Symptoms:evaluate for pleural effusion seen on ct abd.      COMPARISON:  Chest radiograph 06/08/2024      ACCESSION NUMBER(S):  [id number]      ORDERING CLINICIAN:  DILLON KUNZ      FINDINGS:          CARDIOMEDIASTINAL SILHOUETTE:  Cardiomediastinal silhouette is stable in size and configuration.      LUNGS:  Large left-sided pleural effusion with left lower lung airspace  opacification.      ABDOMEN:  No remarkable upper abdominal findings.      BONES:  No acute osseous changes.      Impression: 1.  Large left-sided pleural effusion.          Signed by: Holden Salinas 8/29/2024 6:13 PM  Dictation workstation:   [id number]  CT abdomen pelvis wo IV contrast  Narrative: Interpreted By:  Tamir Luevano,   STUDY:  CT ABDOMEN PELVIS WO IV CONTRAST; 8/29/2024 3:42 pm      INDICATION:  Signs/Symptoms:abdominal pain, hx of ascites      COMPARISON:  06/05/2024.      ACCESSION NUMBER(S):  [id number]      ORDERING CLINICIAN:  SURINDER LAZO      TECHNIQUE:  Spiral axial unenhanced images were obtained from the upper abdomen  to the symphysis pubis. Oral contrast was not administered.      All CT examinations are performed with one  or more of the following  dose reduction techniques: Automated Exposure Control, adjustment of  mA and/or kV according to patient size, or use of iterative  reconstruction techniques.      FINDINGS:  Lung bases: There is interval significant worsening of large left  pleural effusion with left basilar atelectatic changes/infiltrates.  Liver: Heterogenous cirrhotic liver with vague hypodensity again  noted in the left lobe, similar to prior exam. Mild pneumobilia again  seen. Associated worsening ascites in the abdomen and pelvis.  Spleen: Splenomegaly again noted, without focal lesions.  Pancreas: Previous Whipple's procedure, similar to prior exam.  Associated prominent peripancreatic/periportal lymph nodes, similar  to prior exam. Adrenal glands: No focal lesions.  The kidneys are normal in size and position.  There are no renal calculi or hydronephrosis.  No abnormal calcifications are seen within the course of the ureters  or within the partially distended bladder. Pelvic reproductive  organs: Small calcifications again seen in the prostate gland.      Several colonic diverticula are again seen, without acute  diverticulitis. There is again large ventral hernia containing  several small bowel loops, without strangulation or bowel  obstruction. Atherosclerotic calcifications involve the aorta and its  branches, without focal aneurysm. Degenerative changes involve the  L5-S1 level of the lumbar spine.      Impression: 1. Interval significant worsening of large left pleural effusion left  basilar infiltrates/atelectasis; correlate clinically and follow-up  as needed.  2. Heterogenous cirrhotic liver with pneumobilia and stable hypodense  lesion in the left lobe. Splenomegaly. Worsening ascites in the  abdomen and pelvis.  3.  previous Whipple's procedure. Associated prominent  peripancreatic/periportal lymph nodes.  4. Large ventral hernia again seen, containing several small bowel  loops, without strangulation or  bowel obstruction. Colonic  diverticulosis without acute diverticulitis.      Signed by: Tamir Luevano 8/29/2024 4:15 PM  Dictation workstation:   [id number]      Physical Exam/hypoxia 88    Relevant Results               Assessment/Plan                  Assessment & Plan  Generalized abdominal pain    Hypoxia, pain better              Barbara Brunson MD

## 2024-09-02 NOTE — PROGRESS NOTES
"Pulmonary Daily Progress Note   Subjective    Nils Hogan is a 54 y.o. year old male patient known with Duodenal cancer S/P Whipple's procedure admitted on 8/29/2024 with abdominal pain, found to have a large left pleural effusion underwent left thoracentesis - 1.7 liters of clear fluid aspirated under ultrasound guidance by IR. Pleural fluid analysis was as such  pH 7.2   with 5% neutrophils  Total protein 1.6;  serum 6.2 --> ratio < 0.5  LDH 58; Serum 213 --> ratio < 0.6  This is a transudate, a process that match the clinical suspicion for hepatohydrothorax as outlined by Dr Wallace's note on 8/30/24  Interval History:  Feeling ok. Not much SOB. But not much activity. Overnight SpO2 decreased to 88% that recovered with 2 liters supplemental O2    Meds    Scheduled medications  aspirin, 81 mg, oral, Daily  [Held by provider] enoxaparin, 40 mg, subcutaneous, Daily  furosemide, 40 mg, oral, Once  iron sucrose, 200 mg, intravenous, Once  metoprolol tartrate, 25 mg, oral, BID  mirtazapine, 15 mg, oral, Nightly  pantoprazole, 40 mg, oral, Daily before breakfast  spironolactone, 50 mg, oral, Daily  sucralfate, 1 g, oral, Before meals & nightly    PRN medications  PRN medications: ALPRAZolam, ondansetron **OR** ondansetron, oxyCODONE, polyethylene glycol     Objective    Blood pressure 115/61, pulse 95, temperature 36.1 °C (97 °F), temperature source Temporal, resp. rate 16, height 1.626 m (5' 4\"), weight 74.1 kg (163 lb 5.8 oz), SpO2 96%.   Physical Exam   GENERAL: No respiratory distress. Speaks in full sentences  HEAD/SINUSES: no sinus tenderness  OROPHARYNX: Moist mucosa, no thrush or lesions -   NECK: no JVD, midline trachea without stridor.   LUNGS: crackles in R lung fields that decrease with deep breathing. Marked decrease in breath sounds in left lung  CARDIAC: normal S1 and S2; no gallops, rubs or murmurs. Regular rate and rhythm  Abdomen: Large reducible hernia, soft and not tender " abdomen  EXTREMITIES: ++ edema in both lower extremities   NEURO: difficult to understand speech - old stoke    Intake/Output Summary (Last 24 hours) at 9/2/2024 1842  Last data filed at 9/2/2024 1500  Gross per 24 hour   Intake 120 ml   Output 575 ml   Net -455 ml     Labs:   Results from last 72 hours   Lab Units 09/01/24  0654 08/31/24  0553   SODIUM mmol/L 134 135   POTASSIUM mmol/L 3.6 3.7   CHLORIDE mmol/L 99 101   CO2 mmol/L 26 26   BUN mg/dL 4* 5*   CREATININE mg/dL 0.40 0.40   GLUCOSE mg/dL 93 100*   CALCIUM mg/dL 7.7* 7.6*   ANION GAP mmol/L 9 8   EGFR mL/min/1.73m*2 >90 >90      Results from last 72 hours   Lab Units 09/01/24  0654 08/31/24  0552   WBC AUTO x10*3/uL 5.8 6.1   HEMOGLOBIN g/dL 12.6* 10.8*   HEMATOCRIT % 36.9* 31.4*   PLATELETS AUTO x10*3/uL 96* 105*      Micro/ID:   Lab Results   Component Value Date    URINECULTURE No growth 05/29/2024    BLOODCULT No growth at 4 days -  FINAL REPORT 05/29/2024    BLOODCULT No growth at 4 days -  FINAL REPORT 05/29/2024     Summary of key imaging results from the last 24 hours  Chest CT is with large left pleural effusion. peripheral infiltrate is present posterolateral aspect of the right middle lobe and there is some minimal patchy ground-glass opacity through the right lung that may represent atelectasis. Very small R pleural effusion as well    Impression   Nils Hogan is a 54 y.o. year old male patient is being seen by the pulmonary service for   Large left pleural effusion - accumulating rapidly likely   Atelectasis in the right lung and to a larger extend in the left lung   Hypoxia secondary to the atelectasis    Recommendations   As follows:  Re-drain the L hemithorax  Increase lasix to twice a day  Advance physical therapy  Incentive spirometry  Check ECHO to exclude pulmonary HTN  DVT prophylaxis    Travis Salgado MD   09/02/24 at 6:42 PM     Disclaimer: Documentation completed with the information available at the time of input. Parts of this  note may have been scribed or generated using voice dictation software, Dragon.  Homophonic errors may exist.  Please contact me directly if clarification is needed. The times in the chart may not be reflective of actual patient care times, interventions, or procedures. Documentation occurs after the physical care of the patient.

## 2024-09-02 NOTE — CARE PLAN
The patient's goals for the shift include Really wants to eat...asks for order    The clinical goals for the shift include manage pain    Over the shift, the patient did not make progress toward the following goals. Barriers to progression include pain. Recommendations to address these barriers include manage pain.

## 2024-09-02 NOTE — CARE PLAN
Problem: Pain - Adult  Goal: Verbalizes/displays adequate comfort level or baseline comfort level  Outcome: Progressing     Problem: Safety - Adult  Goal: Free from fall injury  Outcome: Progressing     Problem: Discharge Planning  Goal: Discharge to home or other facility with appropriate resources  Outcome: Progressing     Problem: Chronic Conditions and Co-morbidities  Goal: Patient's chronic conditions and co-morbidity symptoms are monitored and maintained or improved  Outcome: Progressing     Problem: Pain  Goal: Turns in bed with improved pain control throughout the shift  Outcome: Progressing  Goal: Walks with improved pain control throughout the shift  Outcome: Progressing  Goal: Performs ADL's with improved pain control throughout shift  Outcome: Progressing  Goal: Participates in PT with improved pain control throughout the shift  Outcome: Progressing  Goal: Free from opioid side effects throughout the shift  Outcome: Progressing  Goal: Free from acute confusion related to pain meds throughout the shift  Outcome: Progressing     Problem: Skin  Goal: Decreased wound size/increased tissue granulation at next dressing change  Outcome: Progressing  Goal: Participates in plan/prevention/treatment measures  Outcome: Progressing  Goal: Prevent/minimize sheer/friction injuries  Outcome: Progressing  Flowsheets (Taken 9/2/2024 3694)  Prevent/minimize sheer/friction injuries: Use pull sheet  Goal: Promote/optimize nutrition  Outcome: Progressing  Goal: Promote skin healing  Outcome: Progressing   The patient's goals for the shift include Really wants to eat...asks for order    The clinical goals for the shift include manage pain, maintain pulse ox 92% or greater    Over the shift, the patient did make progress toward the goals.

## 2024-09-02 NOTE — PROGRESS NOTES
"Nils Hogan is a 54 y.o. male on day 4 of admission presenting with Generalized abdominal pain.    Subjective   Patient down at CT. Per nursing no GI complaints.        Objective     Physical Exam  Vitals and nursing note reviewed.         Last Recorded Vitals  Blood pressure 112/63, pulse 97, temperature 36.8 °C (98.2 °F), temperature source Temporal, resp. rate 16, height 1.626 m (5' 4\"), weight 74.1 kg (163 lb 5.8 oz), SpO2 98%.  Intake/Output last 3 Shifts:  I/O last 3 completed shifts:  In: 240 (3.2 mL/kg) [P.O.:220; IV Piggyback:20]  Out: 875 (11.8 mL/kg) [Urine:875 (0.3 mL/kg/hr)]  Weight: 74.1 kg     Relevant Results  No results found.     Scheduled medications  aspirin, 81 mg, oral, Daily  [Held by provider] enoxaparin, 40 mg, subcutaneous, Daily  furosemide, 40 mg, oral, Once  iron sucrose, 200 mg, intravenous, Once  metoprolol tartrate, 25 mg, oral, BID  mirtazapine, 15 mg, oral, Nightly  pantoprazole, 40 mg, oral, Daily before breakfast  spironolactone, 50 mg, oral, Daily  sucralfate, 1 g, oral, Before meals & nightly      Continuous medications     PRN medications  PRN medications: ALPRAZolam, ondansetron **OR** ondansetron, oxyCODONE, polyethylene glycol  No results found for this or any previous visit (from the past 24 hour(s)).                         Assessment/Plan   Assessment & Plan  Generalized abdominal pain    Alcoholic Cirrhosis hx ETOH abuse. (Sober 2 years) MELD 22 decomp   HCC- AFP normal   HE: A/O x 3. No indication for Lactulose at this time   EV:  last EGD 2019. No varices. No hematemesis. Recommend EGD at some point for surviallence   Ascites: s/p paracentesis with 1.5 L removed. Neg SBP               -Low Na diet               -Continue Lasix and Aldactone      Anemia  Macrocytic anemia hgb 10.8  Reports of dark stool, however he was on Pepto-bismol which turn stool dark.  No urgent indication for scopes   High dose PPI   Add Carafate    H/H stable   - Monitor  CBC, CMP, INR,  - " Transfuse to keep hgb >7     NO s/s of bleeding. Continue diuretics at DC. Plan to follow up as outpatient for liver surveillance/ EGD   GI will sign off     Nargis Leger, APRN-CNP

## 2024-09-03 ENCOUNTER — APPOINTMENT (OUTPATIENT)
Dept: RADIOLOGY | Facility: HOSPITAL | Age: 54
End: 2024-09-03
Payer: COMMERCIAL

## 2024-09-03 ENCOUNTER — APPOINTMENT (OUTPATIENT)
Dept: CARDIOLOGY | Facility: HOSPITAL | Age: 54
End: 2024-09-03
Payer: COMMERCIAL

## 2024-09-03 LAB
AORTIC VALVE MEAN GRADIENT: 4 MMHG
AORTIC VALVE PEAK VELOCITY: 1.23 M/S
AV PEAK GRADIENT: 6.1 MMHG
AVA (PEAK VEL): 3.14 CM2
AVA (VTI): 3.18 CM2
EJECTION FRACTION APICAL 4 CHAMBER: 64.8
EJECTION FRACTION: 63 %
FLUAV RNA RESP QL NAA+PROBE: NOT DETECTED
FLUBV RNA RESP QL NAA+PROBE: NOT DETECTED
FUNGUS SPEC CULT: NORMAL
FUNGUS SPEC FUNGUS STN: NORMAL
GLOBAL LONGITUDINAL STRAIN: -22.5 %
IRON SATN MFR SERPL: 46 % (ref 12–50)
IRON SERPL-MCNC: 59 UG/DL (ref 45–160)
LEFT VENTRICLE INTERNAL DIMENSION DIASTOLE: 4.37 CM (ref 3.5–6)
LEFT VENTRICULAR OUTFLOW TRACT DIAMETER: 2 CM
LV EJECTION FRACTION BIPLANE: 67 %
MITRAL VALVE E/A RATIO: 1.48
MRSA DNA SPEC QL NAA+PROBE: NOT DETECTED
PATH REVIEW-CELL CT,FLUID: NORMAL
RIGHT VENTRICLE FREE WALL PEAK S': 12.2 CM/S
SARS-COV-2 RNA RESP QL NAA+PROBE: NOT DETECTED
TIBC SERPL-MCNC: 127 UG/DL (ref 228–428)
TRICUSPID ANNULAR PLANE SYSTOLIC EXCURSION: 2.9 CM
UIBC SERPL-MCNC: 68 UG/DL (ref 110–370)

## 2024-09-03 PROCEDURE — 2720000007 HC OR 272 NO HCPCS

## 2024-09-03 PROCEDURE — 2500000004 HC RX 250 GENERAL PHARMACY W/ HCPCS (ALT 636 FOR OP/ED): Mod: JZ

## 2024-09-03 PROCEDURE — 36415 COLL VENOUS BLD VENIPUNCTURE: CPT | Performed by: NURSE PRACTITIONER

## 2024-09-03 PROCEDURE — 86738 MYCOPLASMA ANTIBODY: CPT | Performed by: NURSE PRACTITIONER

## 2024-09-03 PROCEDURE — 1200000002 HC GENERAL ROOM WITH TELEMETRY DAILY

## 2024-09-03 PROCEDURE — 2500000001 HC RX 250 WO HCPCS SELF ADMINISTERED DRUGS (ALT 637 FOR MEDICARE OP): Performed by: NURSE PRACTITIONER

## 2024-09-03 PROCEDURE — 36415 COLL VENOUS BLD VENIPUNCTURE: CPT | Performed by: INTERNAL MEDICINE

## 2024-09-03 PROCEDURE — 2500000001 HC RX 250 WO HCPCS SELF ADMINISTERED DRUGS (ALT 637 FOR MEDICARE OP)

## 2024-09-03 PROCEDURE — 93306 TTE W/DOPPLER COMPLETE: CPT

## 2024-09-03 PROCEDURE — 93306 TTE W/DOPPLER COMPLETE: CPT | Performed by: INTERNAL MEDICINE

## 2024-09-03 PROCEDURE — 87449 NOS EACH ORGANISM AG IA: CPT | Mod: WESLAB | Performed by: NURSE PRACTITIONER

## 2024-09-03 PROCEDURE — 97535 SELF CARE MNGMENT TRAINING: CPT | Mod: GO,CO

## 2024-09-03 PROCEDURE — 71045 X-RAY EXAM CHEST 1 VIEW: CPT

## 2024-09-03 PROCEDURE — C1729 CATH, DRAINAGE: HCPCS

## 2024-09-03 PROCEDURE — 97530 THERAPEUTIC ACTIVITIES: CPT | Mod: GO,CO

## 2024-09-03 PROCEDURE — 32555 ASPIRATE PLEURA W/ IMAGING: CPT | Performed by: RADIOLOGY

## 2024-09-03 PROCEDURE — 83540 ASSAY OF IRON: CPT | Performed by: INTERNAL MEDICINE

## 2024-09-03 PROCEDURE — 2500000004 HC RX 250 GENERAL PHARMACY W/ HCPCS (ALT 636 FOR OP/ED): Performed by: NURSE PRACTITIONER

## 2024-09-03 PROCEDURE — 87641 MR-STAPH DNA AMP PROBE: CPT | Performed by: NURSE PRACTITIONER

## 2024-09-03 PROCEDURE — 71045 X-RAY EXAM CHEST 1 VIEW: CPT | Performed by: RADIOLOGY

## 2024-09-03 PROCEDURE — 32555 ASPIRATE PLEURA W/ IMAGING: CPT

## 2024-09-03 PROCEDURE — 2500000001 HC RX 250 WO HCPCS SELF ADMINISTERED DRUGS (ALT 637 FOR MEDICARE OP): Performed by: INTERNAL MEDICINE

## 2024-09-03 PROCEDURE — 87636 SARSCOV2 & INF A&B AMP PRB: CPT | Performed by: NURSE PRACTITIONER

## 2024-09-03 RX ORDER — MEROPENEM 1 G/1
1 INJECTION, POWDER, FOR SOLUTION INTRAVENOUS EVERY 8 HOURS
Status: DISCONTINUED | OUTPATIENT
Start: 2024-09-03 | End: 2024-09-04

## 2024-09-03 RX ORDER — FUROSEMIDE 40 MG/1
40 TABLET ORAL
Status: DISCONTINUED | OUTPATIENT
Start: 2024-09-03 | End: 2024-09-04 | Stop reason: HOSPADM

## 2024-09-03 RX ORDER — VANCOMYCIN 1.5 G/300ML
1500 INJECTION, SOLUTION INTRAVENOUS EVERY 12 HOURS
Status: DISCONTINUED | OUTPATIENT
Start: 2024-09-03 | End: 2024-09-04

## 2024-09-03 RX ORDER — VANCOMYCIN HYDROCHLORIDE 1 G/20ML
INJECTION, POWDER, LYOPHILIZED, FOR SOLUTION INTRAVENOUS DAILY PRN
Status: DISCONTINUED | OUTPATIENT
Start: 2024-09-03 | End: 2024-09-04

## 2024-09-03 ASSESSMENT — COGNITIVE AND FUNCTIONAL STATUS - GENERAL
PERSONAL GROOMING: A LITTLE
DRESSING REGULAR LOWER BODY CLOTHING: A LOT
DRESSING REGULAR LOWER BODY CLOTHING: A LITTLE
DRESSING REGULAR LOWER BODY CLOTHING: A LITTLE
TOILETING: A LITTLE
TURNING FROM BACK TO SIDE WHILE IN FLAT BAD: A LITTLE
MOBILITY SCORE: 17
DAILY ACTIVITIY SCORE: 17
WALKING IN HOSPITAL ROOM: A LITTLE
HELP NEEDED FOR BATHING: A LITTLE
PERSONAL GROOMING: A LITTLE
TOILETING: A LOT
CLIMB 3 TO 5 STEPS WITH RAILING: A LOT
HELP NEEDED FOR BATHING: A LITTLE
MOVING FROM LYING ON BACK TO SITTING ON SIDE OF FLAT BED WITH BEDRAILS: A LITTLE
MOBILITY SCORE: 17
MOVING FROM LYING ON BACK TO SITTING ON SIDE OF FLAT BED WITH BEDRAILS: A LITTLE
DRESSING REGULAR UPPER BODY CLOTHING: A LITTLE
WALKING IN HOSPITAL ROOM: A LITTLE
STANDING UP FROM CHAIR USING ARMS: A LITTLE
DRESSING REGULAR UPPER BODY CLOTHING: A LITTLE
HELP NEEDED FOR BATHING: A LITTLE
CLIMB 3 TO 5 STEPS WITH RAILING: A LOT
DRESSING REGULAR UPPER BODY CLOTHING: A LITTLE
TURNING FROM BACK TO SIDE WHILE IN FLAT BAD: A LITTLE
DAILY ACTIVITIY SCORE: 19
MOVING TO AND FROM BED TO CHAIR: A LITTLE
TOILETING: A LITTLE
STANDING UP FROM CHAIR USING ARMS: A LITTLE
DAILY ACTIVITIY SCORE: 19
PERSONAL GROOMING: A LITTLE
MOVING TO AND FROM BED TO CHAIR: A LITTLE

## 2024-09-03 ASSESSMENT — PAIN - FUNCTIONAL ASSESSMENT
PAIN_FUNCTIONAL_ASSESSMENT: 0-10
PAIN_FUNCTIONAL_ASSESSMENT: FLACC (FACE, LEGS, ACTIVITY, CRY, CONSOLABILITY)
PAIN_FUNCTIONAL_ASSESSMENT: 0-10
PAIN_FUNCTIONAL_ASSESSMENT: FLACC (FACE, LEGS, ACTIVITY, CRY, CONSOLABILITY)
PAIN_FUNCTIONAL_ASSESSMENT: 0-10
PAIN_FUNCTIONAL_ASSESSMENT: FLACC (FACE, LEGS, ACTIVITY, CRY, CONSOLABILITY)
PAIN_FUNCTIONAL_ASSESSMENT: 0-10
PAIN_FUNCTIONAL_ASSESSMENT: 0-10

## 2024-09-03 ASSESSMENT — ENCOUNTER SYMPTOMS
PSYCHIATRIC NEGATIVE: 1
ROS GI COMMENTS: ABDOMINAL HERNIA
NEUROLOGICAL NEGATIVE: 1
GASTROINTESTINAL NEGATIVE: 1
COUGH: 1
ENDOCRINE NEGATIVE: 1
CARDIOVASCULAR NEGATIVE: 1
EYES NEGATIVE: 1
CONSTITUTIONAL NEGATIVE: 1
SHORTNESS OF BREATH: 1
MUSCULOSKELETAL NEGATIVE: 1

## 2024-09-03 ASSESSMENT — PAIN SCALES - GENERAL
PAINLEVEL_OUTOF10: 0 - NO PAIN
PAINLEVEL_OUTOF10: 4
PAINLEVEL_OUTOF10: 0 - NO PAIN
PAINLEVEL_OUTOF10: 3
PAINLEVEL_OUTOF10: 6
PAINLEVEL_OUTOF10: 0 - NO PAIN

## 2024-09-03 ASSESSMENT — ACTIVITIES OF DAILY LIVING (ADL): HOME_MANAGEMENT_TIME_ENTRY: 13

## 2024-09-03 ASSESSMENT — PAIN DESCRIPTION - DESCRIPTORS
DESCRIPTORS: ACHING;DULL
DESCRIPTORS: ACHING

## 2024-09-03 NOTE — NURSING NOTE
Took over care of pt at this time. Pt sitting up in chair, alert, aox3, speech garbled at times. Pt has no complaints of pain. Pt requests to go back to bed stating he's been sitting up for 2 hours. Teresa RN and I assist pt up with is rollator walker, we walk to room door and back before helping him into bed. Pt states his salad is hard for him to eat with his dentures moving and is receptive to Jello which I provide. Pt has no other needs or complaints at this time. Umbilical abdomen dressing clean/dry/intact. Pt oriented to call bell and it and belongings are placed in reach. Telemetry leads connected and reading on monitor. Bed alarm set. So far pt is pulse oxing 98% having returned to bed. Will monitor sat on continuous pulse ox and re apply NC O2 when needed. Continuing to monitor.

## 2024-09-03 NOTE — CONSULTS
Vancomycin Dosing by Pharmacy- INITIAL    Nils Hogan is a 54 y.o. year old male who Pharmacy has been consulted for vancomycin dosing for pneumonia. Based on the patient's indication and renal status this patient will be dosed based on a goal AUC of 400-600.     Renal function is currently stable.    Visit Vitals  /59 (BP Location: Right arm)   Pulse 96   Temp 36.5 °C (97.7 °F)   Resp 16        Lab Results   Component Value Date    CREATININE 0.40 2024    CREATININE 0.40 2024    CREATININE 0.30 (L) 2024    CREATININE 0.40 2024        Patient weight is as follows:   Vitals:    24 1054   Weight: 70 kg (154 lb 5.2 oz)       Cultures:  No results found for the encounter in last 14 days.        I/O last 3 completed shifts:  In: 120 (1.7 mL/kg) [P.O.:120]  Out: 575 (8.2 mL/kg) [Urine:575 (0.2 mL/kg/hr)]  Weight: 70.3 kg   I/O during current shift:  I/O this shift:  In: 300 [P.O.:300]  Out: 200 [Urine:200]    Temp (24hrs), Av.4 °C (97.6 °F), Min:36.1 °C (97 °F), Max:36.8 °C (98.2 °F)         Assessment/Plan     Patient will not be given a loading dose.  Will initiate vancomycin maintenance,  1500 mg every 12 hours.    This dosing regimen is predicted by [employer] to result in the following pharmacokinetic parameters:  Loading dose: N/A  Regimen: 1500 mg IV every 12 hours.  Start time: 13:12 on 2024  Exposure target: AUC24 (range)400-600 mg/L.hr   AUC24,ss: 553 mg/L.hr  Probability of AUC24 > 400: 80 %  Ctrough,ss: 15.2 mg/L  Probability of Ctrough,ss > 20: 31 %  Probability of nephrotoxicity (Lodise LUDA ): 10 %      Follow-up level will be ordered on  with am labs unless clinically indicated sooner.  Will continue to monitor renal function daily while on vancomycin and order serum creatinine at least every 48 hours if not already ordered.  Follow for continued vancomycin needs, clinical response, and signs/symptoms of toxicity.       JACKIE PICHARDO,  PharmD

## 2024-09-03 NOTE — PROGRESS NOTES
Physical Therapy                 Therapy Communication Note    Patient Name: Nils Hogan  MRN: [id number]  Today's Date: 9/3/2024     Discipline: Physical Therapy    Missed Visit Reason: Missed Visit Reason: Other (Comment) (Pt currently off the floor for testing.)    Missed Time: Attempt

## 2024-09-03 NOTE — PROGRESS NOTES
" Nils Hogan is a 54 y.o. male on day 5 of admission presenting with Generalized abdominal pain.    Subjective   54 y.o male presenting with generalized abdominal pain recently returned from paracentesis. Pt. does not appear in acute distress. Pt reports his abdominal wound is healing well. Pt. denies fever, nausea, and vomiting. Pt. denies an urinary symptoms.  Pt is currently on regular diet and is not taking his Shon because he reports he does not like the taste of it.        Objective     Physical Exam  Constitutional:       General: He is not in acute distress.  HENT:      Head: Normocephalic and atraumatic.      Nose: Nose normal.   Eyes:      General: Scleral icterus present.   Cardiovascular:      Rate and Rhythm: Normal rate and regular rhythm.   Pulmonary:      Effort: Pulmonary effort is normal. No respiratory distress.   Abdominal:      General: There is distension.      Comments: Chronic abdominal wound secondary to incisional hernia, appears clean without surrounding erythema. Healing well.    Musculoskeletal:         General: Normal range of motion.      Cervical back: Normal range of motion.   Skin:     General: Skin is dry.      Coloration: Skin is jaundiced.   Neurological:      Mental Status: He is alert and oriented to person, place, and time.      Motor: Weakness present.   Psychiatric:         Mood and Affect: Mood normal.         Behavior: Behavior normal.         Last Recorded Vitals  Blood pressure 113/75, pulse 96, temperature 36.5 °C (97.7 °F), resp. rate 16, height 1.626 m (5' 4\"), weight 70.3 kg (154 lb 15.7 oz), SpO2 99%.  Intake/Output last 3 Shifts:  I/O last 3 completed shifts:  In: 120 (1.7 mL/kg) [P.O.:120]  Out: 575 (8.2 mL/kg) [Urine:575 (0.2 mL/kg/hr)]  Weight: 70.3 kg     Relevant Results               Lab Results   Component Value Date    WBC 5.8 09/01/2024    HGB 12.6 (L) 09/01/2024    HCT 36.9 (L) 09/01/2024     (H) 09/01/2024    PLT 96 (L) 09/01/2024          Lab " Results   Component Value Date    GLUCOSE 93 09/01/2024    CALCIUM 7.7 (L) 09/01/2024     09/01/2024    K 3.6 09/01/2024    CO2 26 09/01/2024    CL 99 09/01/2024    BUN 4 (L) 09/01/2024    CREATININE 0.40 09/01/2024         === 08/29/24 ===    CT CHEST WO IV CONTRAST    - Impression -  1.  Large left pleural effusion with severe compressive atelectasis  of the left lower lobe  2. Small patchy infiltrates in both lungs could represent pneumonia  3. Ascites is similar to the prior study. Once again there is a  cirrhotic appearance of the liver and mild splenomegaly without  change. With regard to the vague left hepatic lobe abnormality on the  prior study, this is even more nondescript on the current exam.  4. Patient is status post Whipple procedure  5. Large midline ventral hernia containing bowel is incompletely  included in the field of view but grossly unchanged    MACRO:  None    Signed by: Ai Gil 9/2/2024 11:39 AM  Dictation workstation:   [id number]       Assessment/Plan   Assessment & Plan  Generalized abdominal pain    9/3: Continue current wound care. Placed order for different flavor garrison in hopes patient will like the taste. Continue PRN antiemetics and analgesics. Ambulate. IS. General surgery will sign off.            SAMUEL PETERS    I was present with the PA student who participated in the documentation of this note. I have personally seen and re-examined the patient and performed the medical decision-making components (assessment and plan of care). I have reviewed the PA student documentation and verified the findings in the note as written with additions or exceptions as stated in the body of this note.    Roz Maldonado PA-C

## 2024-09-03 NOTE — CARE PLAN
The patient's goals for the shift include dc planning and comfort.     The clinical goals for the shift include manage pain, maintain pulse ox 92% or greater

## 2024-09-03 NOTE — PROGRESS NOTES
Nils Richardson is a 54 y.o. male on day 5 of admission presenting with Generalized abdominal pain.      Subjective   Needs the fluid left base drained, done       Objective     Last Recorded Vitals  /59 (BP Location: Right arm)   Pulse 96   Temp 36.5 °C (97.7 °F)   Resp 16   Wt 70 kg (154 lb 5.2 oz)   SpO2 100%   Intake/Output last 3 Shifts:    Intake/Output Summary (Last 24 hours) at 9/3/2024 1500  Last data filed at 9/3/2024 1434  Gross per 24 hour   Intake 800 ml   Output 200 ml   Net 600 ml       Admission Weight  Weight: 71 kg (156 lb 8.4 oz) (08/29/24 1424)    Daily Weight  09/03/24 : 70 kg (154 lb 5.2 oz)    Image Results  Transthoracic Echo (TTE) [place] [address]             Phone [phone]    TRANSTHORACIC ECHOCARDIOGRAM REPORT    Patient Name:     NILS RICHARDSON        Reading Physician:   [id number] Milan Nicole DO  Study Date:       9/3/2024             Ordering Provider:   [id number] TASIA SEVERINO  MRN/PID:          [id number]             Fellow:  Accession#:       [id number]         Nurse:  Date of           1970 / 54 years Sonographer:         Carri Alcala  Birth/Age:  Gender:           M                    Additional Staff:  Height:           162.56 cm            Admit Date:  Weight:           69.85 kg             Admission Status:    Inpatient - Routine  BSA / BMI:        1.75 m2 / 26.43      [facility]                    kg/m2  Blood Pressure: 110 /59 mmHg    Study Type:    TRANSTHORACIC ECHO (TTE) COMPLETE  Diagnosis/ICD: Other secondary hypertension-I15.8  CPT Codes:     Echo Complete w Full Doppler-[id number]   Study Detail: The following Echo studies were performed: 2D, M-Mode, Doppler,                color flow, Strain and 3D.       PHYSICIAN INTERPRETATION:  Left  Ventricle: Left ventricular ejection fraction is normal, by visual estimate at 60-65%. There are no regional wall motion abnormalities. The left ventricular cavity size is normal. Left Ventricular Global Longitudinal Strain - -22.5 %. Spectral Doppler shows an impaired relaxation pattern of left ventricular diastolic filling.  Left Atrium: The left atrium is normal in size.  Right Ventricle: The right ventricle is normal in size. There is normal right ventricular global systolic function.  Right Atrium: The right atrium is normal in size.  Aortic Valve: The aortic valve is trileaflet. The aortic valve dimensionless index is 1.01. There is no evidence of aortic valve regurgitation. The peak instantaneous gradient of the aortic valve is 6.1 mmHg. The mean gradient of the aortic valve is 4.0 mmHg.  Mitral Valve: The mitral valve is normal in structure. There is trace mitral valve regurgitation.  Tricuspid Valve: The tricuspid valve is structurally normal. There is trace tricuspid regurgitation.  Pulmonic Valve: The pulmonic valve is not well visualized. The pulmonic valve regurgitation was not well visualized.  Pericardium: There is no pericardial effusion noted.  Aorta: The aortic root is normal.       CONCLUSIONS:   1. Left ventricular ejection fraction is normal, by visual estimate at 60-65%.   2. Spectral Doppler shows an impaired relaxation pattern of left ventricular diastolic filling.   3. There is normal right ventricular global systolic function.    QUANTITATIVE DATA SUMMARY:  2D MEASUREMENTS:                           Normal Ranges:  IVSd:          1.08 cm   (0.6-1.1cm)  LVPWd:         0.74 cm   (0.6-1.1cm)  LVIDd:         4.37 cm   (3.9-5.9cm)  LVIDs:         2.67 cm  LV Mass Index: 73.5 g/m2  LV % FS        38.9 %    LA VOLUME:                               Normal Ranges:  LA Volume Index:  38.3 ml/m2  LA Vol A4C:       63.4 ml  LA Vol A2C:       70.6 ml  LA Vol Index BSA: 38.3 ml/m2    RA VOLUME BY A/L  METHOD:                        Normal Ranges:  RA Area A4C: 10.1 cm2    AORTA MEASUREMENTS:                       Normal Ranges:  Ao Sinus, d: 3.20 cm (2.1-3.5cm)  Ao STJ, d:   2.09 cm (1.7-3.4cm)  Asc Ao, d:   2.80 cm (2.1-3.4cm)    LV SYSTOLIC FUNCTION BY 2D PLANIMETRY (MOD):                                          Normal Ranges:  EF-A4C View:                      65 %  (>=55%)  EF-A2C View:                      68 %  EF-Biplane:                       67 %  EF-Visual:                        63 %  EF-Auto:                          66 %  LV EF Reported:                   63 %  Global Longitudinal Strain (GLS): -22 %    LV DIASTOLIC FUNCTION:                          Normal Ranges:  MV Peak E:    0.90 m/s  (0.7-1.2 m/s)  MV Peak A:    0.61 m/s  (0.42-0.7 m/s)  E/A Ratio:    1.48      (1.0-2.2)  MV e'         0.101 m/s (>8.0)  MV lateral e' 0.11 m/s  MV medial e'  0.09 m/s  E/e' Ratio:   8.93      (<8.0)    MITRAL VALVE:                  Normal Ranges:  MV DT: 220 msec (150-240msec)    AORTIC VALVE:                                    Normal Ranges:  AoV Vmax:                1.23 m/s (<=1.7m/s)  AoV Peak P.1 mmHg (<20mmHg)  AoV Mean P.0 mmHg (1.7-11.5mmHg)  LVOT Max Vickey:            1.23 m/s (<=1.1m/s)  AoV VTI:                 28.20 cm (18-25cm)  LVOT VTI:                28.50 cm  LVOT Diameter:           2.00 cm  (1.8-2.4cm)  AoV Area, VTI:           3.18 cm2 (2.5-5.5cm2)  AoV Area,Vmax:           3.14 cm2 (2.5-4.5cm2)  AoV Dimensionless Index: 1.01       RIGHT VENTRICLE:  RV Basal 2.78 cm  TAPSE:   29.1 mm  RV s'    0.12 m/s       [id number] Milan Nicole DO  Electronically signed on 9/3/2024 at 2:01:47 PM       ** Final **  US thoracentesis  Narrative: Interpreted By:  Virgilio Davila,   STUDY:  US THORACENTESIS; 2024 2:46 pm      INDICATION:  Left pleural effusion      COMPARISON:  None      ACCESSION NUMBER(S):  [id number]      ORDERING CLINICIAN:  VIRGILIO DAVILA       TECHNIQUE:  Informed consent obtained. Patient positionedsitting upright. Skin  prepped, draped and anesthetized. Under ultrasound guidance, a  centesis catheter/needle was advanced into leftpleural cavity.      FINDINGS:  A total of 1.7 L of clear yellow fluid was aspirated. A sample was  sent for analysis. The patient tolerated the procedure well.      Impression: Ultrasound-guided left thoracentesis.      Signed by: Francisco Javier Davila 9/3/2024 10:00 AM  Dictation workstation:   [id number]  US guided abdominal paracentesis  Narrative: Interpreted By:  Francisco Javier Davila,   STUDY:  US GUIDED ABDOMINAL PARACENTESIS; 8/30/2024 2:46 pm      INDICATION:  Signs/Symptoms:ascites.      COMPARISON:  None.      ACCESSION NUMBER(S):  [id number]      ORDERING CLINICIAN:  FRANCISCO JAVIER DAVILA      TECHNIQUE:  Ultrasound-guided paracentesis      FINDINGS:  Informed consent obtained. Patient positioned supine. Right lower  quadrant prepped, draped and anesthetized. Under ultrasound guidance,  8 Khmer centesis sheath needle inserted into peritoneal cavity. 1.5  Lof clear yellowfluid aspirated. Patient tolerated procedure well      Impression: Ultrasound-guided paracentesis.      Signed by: Francisco Javier Davila 9/3/2024 9:59 AM  Dictation workstation:   [id number]      Physical Exam/better    Relevant Results               Assessment/Plan                  Assessment & Plan  Generalized abdominal pain    Awaitting clearence from pulmonary              Barbara Brunson MD

## 2024-09-03 NOTE — NURSING NOTE
Pt resting in bed. No complaints or concerns. Call light within reach.  Pt continues on atb per order.

## 2024-09-03 NOTE — SIGNIFICANT EVENT
Home oxygen certification.  Resting spo2 96% on room air.  Patient is unwilling to walk, patient states he is too tired to walk.  Nursing is aware.

## 2024-09-03 NOTE — PROGRESS NOTES
Pt to have thoracentesis today. Updates sent to [facility] who are following.      09/03/24 1129   Discharge Planning   Expected Discharge Disposition HH Services  ([facility])

## 2024-09-03 NOTE — NURSING NOTE
Pt A&O x3 currently resting in bed. No complaints or concerns. Call light within reach. Bed alarm in place for pt safety.

## 2024-09-03 NOTE — PROGRESS NOTES
Occupational Therapy    OT Treatment    Patient Name: Nils Hogan  MRN: [id number]  Today's Date: 9/3/2024  Time Calculation  Start Time: 0906  Stop Time: 0930  Time Calculation (min): 24 min        Assessment:  OT Assessment: Tolerated session fairly, demonstrating continued progression towards POC with intermittent cues required to pace tasks + speaking to increase safety and communication. Pt would benefit from continued skilled OT services to improve strength, balance, and functional tolerance to increase independence with ADL tasks  End of Session Communication: Bedside nurse  End of Session Patient Position: On cart  OT Assessment Results: Decreased ADL status, Decreased upper extremity range of motion, Decreased upper extremity strength, Decreased endurance, Decreased gross motor control, Decreased functional mobility  Plan:  Treatment Interventions: ADL retraining, Functional transfer training, UE strengthening/ROM, Endurance training, Patient/family training, Equipment evaluation/education, Compensatory technique education  OT Frequency: 4 times per week  OT Discharge Recommendations: Low intensity level of continued care  Equipment Recommended upon Discharge: Wheeled walker  OT Recommended Transfer Status: Assist of 1, Minimal assist  OT - OK to Discharge: Yes  Treatment Interventions: ADL retraining, Functional transfer training, UE strengthening/ROM, Endurance training, Patient/family training, Equipment evaluation/education, Compensatory technique education    Subjective   Previous Visit Info:  OT Last Visit  OT Received On: 09/03/24  General:  General  Prior to Session Communication: Bedside nurse  Patient Position Received: Bed, 3 rail up, Alarm off, not on at start of session  General Comment: Cleared for therapy per RN. 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    UE Dressing  UE Dressing Comments: declining to change shirt    LE Dressing  LE Dressing: No (declining to don pants despite leaving room for IR)    Bed Mobility/Transfers: Bed Mobility  Bed Mobility: Yes  Bed Mobility 1  Bed Mobility 1: Supine to sitting  Level of Assistance 1: Minimum assistance, Minimal verbal cues  Bed Mobility Comments 1: assist with trunk elevation with increased time + effort required to complete supine>sit with cues to scoot hips to EOB    Transfers  Transfer: Yes  Transfer 1  Technique 1: Sit to stand, Stand to sit  Transfer Device 1: Walker  Transfer Level of Assistance 1: Minimum assistance, Minimal verbal cues  Trials/Comments 1: assist for balance with cues for proper hand placement, demonstrating decreased eccentric lowering to chair  Transfers 2  Transfer From 2: Chair with arms to  Transfer to 2:  (cart)  Technique 2: Sit to stand, Stand to sit  Transfer Device 2: Walker  Transfer Level of Assistance 2: Minimum assistance  Trials/Comments 2: assist for trunk elevation with cues for proper hand placement and eccentric lowering to transport cart    Functional Mobility:  Functional Mobility  Functional Mobility Performed: Yes  Functional Mobility 1  Device 1: Rollator  Assistance 1: Contact guard  Comments 1: funtional mobility short household distance with rollator with cues for postural alignment with increased time + effort required for task completion,  demonstrating fair- balance    Standing Balance:  Static Standing Balance  Static Standing-Level of Assistance: Minimum assistance  Static Standing-Comment/Number of Minutes: fair- balance during funcitonal mobility task    Outcome Measures:[facility] Daily Activity  Putting on and taking off regular lower body clothing: A lot  Bathing (including washing, rinsing, drying): A little  Putting on and taking off regular upper body clothing: A little  Toileting, which includes using toilet, bedpan or urinal: A lot  Taking care of personal grooming such as brushing teeth: A little  Eating Meals: None  Daily Activity - Total Score: 17    Education Documentation  Body Mechanics, taught by LAUREANO Thapa at 9/3/2024 11:48 AM.  Learner: Patient  Readiness: Acceptance  Method: Explanation  Response: Verbalizes Understanding    ADL Training, taught by LAUREANO Thapa at 9/3/2024 11:48 AM.  Learner: Patient  Readiness: Acceptance  Method: Explanation  Response: Verbalizes Understanding    Education Comments  No comments found.        Problem: ADLs  Goal: Patient with complete upper body dressing with independent level of assistance donning and doffing all UE clothes while edge of bed   Outcome: Progressing  Goal: Patient with complete lower body dressing with modified independent level of assistance donning and doffing all LE clothes  with PRN adaptive equipment while edge of bed   Outcome: Progressing  Goal: Patient will complete daily grooming tasks with modified independent level of assistance and PRN adaptive equipment while standing.  Outcome: Progressing  Goal: Patient will complete toileting including hygiene clothing management/hygiene with modified independent level of assistance and grab bars.  Outcome: Progressing     Problem: TRANSFERS  Goal: Patient will complete functional transfer to toilet/commode with least restrictive device with modified independent level of assistance.  Outcome:  Progressing     Problem: MOBILITY  Goal: Patient will perform Functional mobility max Household distances/Community Distances with modified independent level of assistance and least restrictive device in order to improve safety and functional mobility.  Outcome: Progressing

## 2024-09-03 NOTE — CONSULTS
Inpatient consult to Infectious Diseases  Consult performed by: Caroline Palomo, APRN-CNP  Consult ordered by: Barbara Brunson MD  Reason for consult: possible pneumonia          Primary MD: Angela Bryant DO      History Of Present Illness  Nils Hogan is a 54 y.o. male past medical history of duodenal cancer, status post Whipple procedure, large abdominal hernia with wound dehiscence, abdominal ascites.  He presented to the  emergency room with right upper quadrant abdominal pain.  He is afebrile, denies chills.  He is on 2 liters of oxygen.  He reports chronic cough with no worsening. Nonproductive.  He reports he is feeling better.  He denies nausea vomiting or diarrhea.  Allergy to zosyn, has tolerated meropenem, keflex and ceftriaxone.  Labs with no leukocytosis.  Thrombocytopenia.  CT of chest showed large pleural effusion.  Patchy infiltrate in bilateral lungs.  Ascites.  He Underwent thoracentesis today with 1.7 Liters aspirated and paracentesis with 1.5 liters removed.    Past Medical History  He has a past medical history of Bipolar I disorder (Multi), CVA (cerebral vascular accident) (Multi), Duodenal cancer (Multi) (2019), Epilepsy (Multi), Hyperlipidemia, Hypertension, Tobacco use, and Urethral stricture.    Surgical History  He has a past surgical history that includes Adenoidectomy; Whipple procedure (2019); and Urethra surgery.     Social History     Occupational History    Not on file   Tobacco Use    Smoking status: Former     Current packs/day: 0.00     Average packs/day: 0.3 packs/day for 37.6 years (9.4 ttl pk-yrs)     Types: Cigarettes     Start date:      Quit date: 2024     Years since quittin.0    Smokeless tobacco: Not on file   Substance and Sexual Activity    Alcohol use: Yes     Comment: He reports that he drinks alcohol occasionally    Drug use: Not on file    Sexual activity: Not on file     Travel History   Travel since 22    No documented  travel since 08/03/24        Service:  [employer] Years Served Period   [employer]       Comments:        Family History  Family History   Problem Relation Name Age of Onset    Stroke Mother      Bipolar disorder Mother      Prostate cancer Father      Hypertension Sister      Hyperlipidemia Sister      Asthma Son      Colon cancer Paternal Grandfather       Allergies  Diazepam, Iodinated contrast media, Iodine, Zosyn [piperacillin-tazobactam], Acetaminophen, and Phenytoin sodium extended     Immunization History   Administered Date(s) Administered    Moderna SARS-CoV-2 Vaccination 05/18/2021, 06/15/2021     Medications  Home medications:  Medications Prior to Admission   Medication Sig Dispense Refill Last Dose    ALPRAZolam (Xanax) 1 mg tablet Take 1 tablet (1 mg) by mouth 2 times a day as needed for anxiety.       ascorbic acid (Vitamin C) 1,000 mg tablet Take 1 tablet (1,000 mg) by mouth once daily.       aspirin 81 mg EC tablet Take 1 tablet (81 mg) by mouth once daily as needed for mild pain (1 - 3).       cephalexin (Keflex) 500 mg capsule Take 1 capsule (500 mg) by mouth 4 times a day. 28 capsule 0     cholecalciferol (D3-2000) 50 mcg (2,000 unit) capsule Take 1 capsule (50 mcg) by mouth once daily.       metoprolol tartrate (Lopressor) 25 mg tablet Take 1 tablet (25 mg) by mouth 2 times a day.       pantoprazole (ProtoNix) 40 mg EC tablet Take 1 tablet (40 mg) by mouth once daily in the morning. Take before meals.       QUEtiapine (SEROquel) 100 mg tablet Take 0.5 tablets (50 mg) by mouth once daily at bedtime.       zinc gluconate 50 mg tablet Take 1 tablet (50 mg of elemental zinc) by mouth once daily.        Current medications:  Scheduled medications  aspirin, 81 mg, oral, Daily  [Held by provider] enoxaparin, 40 mg, subcutaneous, Daily  furosemide, 40 mg, oral, Once  iron sucrose, 200 mg, intravenous, Once  metoprolol tartrate, 25 mg, oral, BID  mirtazapine, 15 mg, oral, Nightly  pantoprazole, 40  mg, oral, Daily before breakfast  spironolactone, 50 mg, oral, Daily  sucralfate, 1 g, oral, Before meals & nightly      Continuous medications     PRN medications  PRN medications: ALPRAZolam, ondansetron **OR** ondansetron, oxyCODONE, polyethylene glycol    Review of Systems   Constitutional: Negative.    HENT: Negative.     Eyes: Negative.    Respiratory:  Positive for cough and shortness of breath.    Cardiovascular: Negative.    Gastrointestinal: Negative.         Abdominal hernia   Endocrine: Negative.    Genitourinary: Negative.    Musculoskeletal: Negative.    Skin: Negative.    Neurological: Negative.    Psychiatric/Behavioral: Negative.          Objective  Range of Vitals (last 24 hours)  Heart Rate:  []   Temp:  [36.1 °C (97 °F)-36.8 °C (98.2 °F)]   Resp:  [15-18]   BP: ()/(53-75)   Weight:  [70.3 kg (154 lb 15.7 oz)]   SpO2:  [94 %-100 %]   Daily Weight  09/03/24 : 70.3 kg (154 lb 15.7 oz)    Body mass index is 26.6 kg/m².     Physical Exam  Constitutional:       Appearance: Normal appearance.   HENT:      Head: Normocephalic and atraumatic.      Nose: Nose normal.      Mouth/Throat:      Mouth: Mucous membranes are moist.      Pharynx: Oropharynx is clear.   Eyes:      General: Scleral icterus present.   Cardiovascular:      Rate and Rhythm: Normal rate and regular rhythm.   Pulmonary:      Effort: Pulmonary effort is normal.      Breath sounds: Normal breath sounds.   Abdominal:      General: Bowel sounds are normal. There is distension.      Palpations: Abdomen is soft.      Hernia: A hernia is present.      Comments: Small healing abdominal wound   Musculoskeletal:         General: Normal range of motion.      Cervical back: Normal range of motion and neck supple.   Skin:     General: Skin is warm and dry.      Coloration: Skin is jaundiced.   Neurological:      General: No focal deficit present.      Mental Status: He is alert and oriented to person, place, and time. Mental status is at  "baseline.   Psychiatric:         Mood and Affect: Mood normal.         Behavior: Behavior normal.          Relevant Results  Outside Hospital Results  No  Labs  Results from last 72 hours   Lab Units 09/01/24  0654   WBC AUTO x10*3/uL 5.8   HEMOGLOBIN g/dL 12.6*   HEMATOCRIT % 36.9*   PLATELETS AUTO x10*3/uL 96*     Results from last 72 hours   Lab Units 09/01/24  0654   SODIUM mmol/L 134   POTASSIUM mmol/L 3.6   CHLORIDE mmol/L 99   CO2 mmol/L 26   BUN mg/dL 4*   CREATININE mg/dL 0.40   GLUCOSE mg/dL 93   CALCIUM mg/dL 7.7*   ANION GAP mmol/L 9   EGFR mL/min/1.73m*2 >90         Estimated Creatinine Clearance: 125 mL/min (by C-G formula based on SCr of 0.4 mg/dL).  CRP   Date Value Ref Range Status   03/27/2019 1.6 0 - 2.0 MG/DL Final     Comment:     Performed at [place] [address]     No results found for: \"HIV1X2\", \"HIVCONF\", \"AUDMQA8KY\"  Hepatitis C AB   Date Value Ref Range Status   05/31/2024 Nonreactive Nonreactive Final     Comment:     Results from patients taking biotin supplements or receiving high-dose biotin therapy should be interpreted with caution due to possible interference with this test. Providers may contact their local laboratory for further information.     Microbiology  Pleural aspirate culture pending      Imaging  US thoracentesis    Result Date: 9/3/2024  Interpreted By:  Francisco Javier Davila, STUDY: US THORACENTESIS; 8/30/2024 2:46 pm   INDICATION: Left pleural effusion   COMPARISON: None   ACCESSION NUMBER(S): [id number]   ORDERING CLINICIAN: FRANCISCO JAVIER DAVILA   TECHNIQUE: Informed consent obtained. Patient positionedsitting upright. Skin prepped, draped and anesthetized. Under ultrasound guidance, a centesis catheter/needle was advanced into leftpleural cavity.   FINDINGS: A total of 1.7 L of clear yellow fluid was aspirated. A sample was sent for analysis. The patient tolerated the procedure well.       Ultrasound-guided left thoracentesis.   Signed by: Francisco Javier" Giovanni 9/3/2024 10:00 AM Dictation workstation:   [id number]    US guided abdominal paracentesis    Result Date: 9/3/2024  Interpreted By:  Francisco Javier Davila, STUDY: US GUIDED ABDOMINAL PARACENTESIS; 8/30/2024 2:46 pm   INDICATION: Signs/Symptoms:ascites.   COMPARISON: None.   ACCESSION NUMBER(S): [id number]   ORDERING CLINICIAN: FRANCISCO JAVIER DAVILA   TECHNIQUE: Ultrasound-guided paracentesis   FINDINGS: Informed consent obtained. Patient positioned supine. Right lower quadrant prepped, draped and anesthetized. Under ultrasound guidance, 8 Turkmen centesis sheath needle inserted into peritoneal cavity. 1.5 Lof clear yellowfluid aspirated. Patient tolerated procedure well       Ultrasound-guided paracentesis.   Signed by: Francisco Javier Davila 9/3/2024 9:59 AM Dictation workstation:   [id number]    CT chest wo IV contrast    Result Date: 9/2/2024  Interpreted By:  Ai Gil, STUDY: CT CHEST WO IV CONTRAST;  9/2/2024 10:18 am   INDICATION: Signs/Symptoms:hypoxia.     COMPARISON: Chest x-ray 08/29/2024 and CT abdomen pelvis 08/29/2024   ACCESSION NUMBER(S): [id number]   ORDERING CLINICIAN: ALYX CAMILO   TECHNIQUE: Helical data acquisition of the chest was obtained  without IV contrast material.  Images were reformatted in axial, coronal, and sagittal planes. Images extend through the abdomen to the level of the iliac crest.   FINDINGS: LUNGS AND AIRWAYS: The trachea and central airways are patent. No endobronchial lesion.   Large left pleural effusion is only minimally decreased from the prior chest x-ray, with interval thoracentesis removing 1.7 L of fluid on 08/30/2024. Compressive atelectasis involves most of the left lower lobe. Some mild patchy ground-glass opacities are present in the left upper lobe.   There is minimal pleural fluid present on the right. A small peripheral infiltrate is present posterolateral aspect of the right middle lobe and there is some minimal patchy ground-glass opacity through the right  lung.   MEDIASTINUM AND HANANE, LOWER NECK AND AXILLA: The visualized thyroid gland is within normal limits.   No evidence of thoracic lymphadenopathy by CT criteria.   Esophagus appears within normal limits as seen.   HEART AND VESSELS: The thoracic aorta is of normal course and caliber with mild patchy vascular calcifications.   Main pulmonary artery and its branches are normal in caliber.   Patchy coronary artery calcifications are seen. The study is not optimized for evaluation of coronary arteries.   The cardiac chambers are not enlarged.   No evidence of pericardial effusion.   ABDOMEN: Liver is unchanged in size and contour with prominence of the left lobe and heterogeneity. Pneumobilia is unchanged. Gallbladder is absent.   Patient is status post Whipple procedure. The remaining portion of the pancreas is severely atrophic. There is no inflammation or gross mass.   Spleen is mildly enlarged measuring about 13 x 13 x 7 cm.   Adrenal glands appear normal.   No hydronephrosis, stone or gross mass is noted involving the kidneys.   Included bowel loops are not dilated. The large ventral hernia is partially excluded from field of view and appears grossly unchanged. Ascites is similar in volume to the prior study. There is no gross pneumoperitoneum noted.   Aorta shows no aneurysmal dilatation. Vena cava appears normal in size.   CHEST WALL AND OSSEOUS STRUCTURES: No acute or suspicious osseous abnormality is noted. There is mild bilateral gynecomastia.       1.  Large left pleural effusion with severe compressive atelectasis of the left lower lobe 2. Small patchy infiltrates in both lungs could represent pneumonia 3. Ascites is similar to the prior study. Once again there is a cirrhotic appearance of the liver and mild splenomegaly without change. With regard to the vague left hepatic lobe abnormality on the prior study, this is even more nondescript on the current exam. 4. Patient is status post Whipple procedure  5. Large midline ventral hernia containing bowel is incompletely included in the field of view but grossly unchanged   MACRO: None   Signed by: Ai Gil 9/2/2024 11:39 AM Dictation workstation:   [id number]    XR chest 1 view    Result Date: 8/29/2024  Interpreted By:  Holden Salinas, STUDY: XR CHEST 1 VIEW;  8/29/2024 6:04 pm   INDICATION: Signs/Symptoms:evaluate for pleural effusion seen on ct abd.   COMPARISON: Chest radiograph 06/08/2024   ACCESSION NUMBER(S): [id number]   ORDERING CLINICIAN: DILLON KUNZ   FINDINGS:     CARDIOMEDIASTINAL SILHOUETTE: Cardiomediastinal silhouette is stable in size and configuration.   LUNGS: Large left-sided pleural effusion with left lower lung airspace opacification.   ABDOMEN: No remarkable upper abdominal findings.   BONES: No acute osseous changes.       1.  Large left-sided pleural effusion.     Signed by: Holden Salinas 8/29/2024 6:13 PM Dictation workstation:   [id number]    CT abdomen pelvis wo IV contrast    Result Date: 8/29/2024  Interpreted By:  Tamir Luevano, STUDY: CT ABDOMEN PELVIS WO IV CONTRAST; 8/29/2024 3:42 pm   INDICATION: Signs/Symptoms:abdominal pain, hx of ascites   COMPARISON: 06/05/2024.   ACCESSION NUMBER(S): [id number]   ORDERING CLINICIAN: SURINDER LAZO   TECHNIQUE: Spiral axial unenhanced images were obtained from the upper abdomen to the symphysis pubis. Oral contrast was not administered.   All CT examinations are performed with one or more of the following dose reduction techniques: Automated Exposure Control, adjustment of mA and/or kV according to patient size, or use of iterative reconstruction techniques.   FINDINGS: Lung bases: There is interval significant worsening of large left pleural effusion with left basilar atelectatic changes/infiltrates. Liver: Heterogenous cirrhotic liver with vague hypodensity again noted in the left lobe, similar to prior exam. Mild pneumobilia again seen. Associated worsening ascites in the  abdomen and pelvis. Spleen: Splenomegaly again noted, without focal lesions. Pancreas: Previous Whipple's procedure, similar to prior exam. Associated prominent peripancreatic/periportal lymph nodes, similar to prior exam. Adrenal glands: No focal lesions. The kidneys are normal in size and position. There are no renal calculi or hydronephrosis. No abnormal calcifications are seen within the course of the ureters or within the partially distended bladder. Pelvic reproductive organs: Small calcifications again seen in the prostate gland.   Several colonic diverticula are again seen, without acute diverticulitis. There is again large ventral hernia containing several small bowel loops, without strangulation or bowel obstruction. Atherosclerotic calcifications involve the aorta and its branches, without focal aneurysm. Degenerative changes involve the L5-S1 level of the lumbar spine.       1. Interval significant worsening of large left pleural effusion left basilar infiltrates/atelectasis; correlate clinically and follow-up as needed. 2. Heterogenous cirrhotic liver with pneumobilia and stable hypodense lesion in the left lobe. Splenomegaly. Worsening ascites in the abdomen and pelvis. 3.  previous Whipple's procedure. Associated prominent peripancreatic/periportal lymph nodes. 4. Large ventral hernia again seen, containing several small bowel loops, without strangulation or bowel obstruction. Colonic diverticulosis without acute diverticulitis.   Signed by: Tamir Luevano 8/29/2024 4:15 PM Dictation workstation:   [id number]     Assessment/Plan   Acute hypoxic respiratory failure, on low flow oxygen   Large left pleural effusion-s/p thoracentesis 9/3/2024  Ascites-s/p paracentesis 9/3/2024  Abnormal CT chest-atelectasis,mild ground glass opacities -rule out pneumonia, covid  History Duodenal cancer, s/p Whipple  Allergy zosyn-anaphylactic-has tolerated meropenem, keflex,ceftriaxone       IV meropenem-monitor for  adverse reaction-has previously tolerated   IV vancomycin  IV doxycycline  Covid/influenza PCR  Follow up Repeat chest x-ray  Legionella urine antigen  Mycoplasma serology   Nasal MRSA PCR  Monitor oxygenation  Monitor temperature and WBC  Follow-up pleural aspirate  Follow-up peritoneal  fluid   Further recommendations based on workup    Discussed with Dr. Schroeder     Total time spent caring for the patient today was 40 minutes. This includes time spent before the visit reviewing the chart, time spent during the visit, and time spent after the visit on documentation.     Caroline Palomo, APRN-CNP

## 2024-09-03 NOTE — CARE PLAN
Problem: Pain - Adult  Goal: Verbalizes/displays adequate comfort level or baseline comfort level  Outcome: Progressing     Problem: Safety - Adult  Goal: Free from fall injury  Outcome: Progressing     Problem: Discharge Planning  Goal: Discharge to home or other facility with appropriate resources  Outcome: Progressing     Problem: Chronic Conditions and Co-morbidities  Goal: Patient's chronic conditions and co-morbidity symptoms are monitored and maintained or improved  Outcome: Progressing     Problem: Pain  Goal: Turns in bed with improved pain control throughout the shift  Outcome: Progressing  Goal: Walks with improved pain control throughout the shift  Outcome: Progressing  Goal: Performs ADL's with improved pain control throughout shift  Outcome: Progressing  Goal: Participates in PT with improved pain control throughout the shift  Outcome: Progressing  Goal: Free from opioid side effects throughout the shift  Outcome: Progressing  Goal: Free from acute confusion related to pain meds throughout the shift  Outcome: Progressing     Problem: Skin  Goal: Decreased wound size/increased tissue granulation at next dressing change  Outcome: Progressing  Goal: Participates in plan/prevention/treatment measures  Outcome: Progressing  Goal: Prevent/minimize sheer/friction injuries  Outcome: Progressing  Goal: Promote/optimize nutrition  Outcome: Progressing  Goal: Promote skin healing  Outcome: Progressing   The patient's goals for the shift include Really wants to eat...asks for order    The clinical goals for the shift include manage pain, maintain pulse ox 92% or greater    Over the shift, the patient did make progress toward the goals.

## 2024-09-04 ENCOUNTER — PHARMACY VISIT (OUTPATIENT)
Dept: PHARMACY | Facility: CLINIC | Age: 54
End: 2024-09-04
Payer: COMMERCIAL

## 2024-09-04 ENCOUNTER — APPOINTMENT (OUTPATIENT)
Dept: CARDIOLOGY | Facility: HOSPITAL | Age: 54
End: 2024-09-04
Payer: COMMERCIAL

## 2024-09-04 VITALS
DIASTOLIC BLOOD PRESSURE: 52 MMHG | OXYGEN SATURATION: 95 % | TEMPERATURE: 97.7 F | BODY MASS INDEX: 27.02 KG/M2 | HEART RATE: 96 BPM | RESPIRATION RATE: 16 BRPM | HEIGHT: 64 IN | SYSTOLIC BLOOD PRESSURE: 108 MMHG | WEIGHT: 158.29 LBS

## 2024-09-04 LAB
ACID FAST STN SPEC: NORMAL
ALBUMIN SERPL-MCNC: 2 G/DL (ref 3.5–5)
ALP BLD-CCNC: 149 U/L (ref 35–125)
ALT SERPL-CCNC: 22 U/L (ref 5–40)
ANION GAP SERPL CALC-SCNC: 8 MMOL/L
AST SERPL-CCNC: 98 U/L (ref 5–40)
BILIRUB SERPL-MCNC: 5 MG/DL (ref 0.1–1.2)
BUN SERPL-MCNC: 5 MG/DL (ref 8–25)
CALCIUM SERPL-MCNC: 7.9 MG/DL (ref 8.5–10.4)
CHLORIDE SERPL-SCNC: 99 MMOL/L (ref 97–107)
CO2 SERPL-SCNC: 30 MMOL/L (ref 24–31)
CREAT SERPL-MCNC: 0.3 MG/DL (ref 0.4–1.6)
EGFRCR SERPLBLD CKD-EPI 2021: >90 ML/MIN/1.73M*2
EJECTION FRACTION APICAL 4 CHAMBER: 74.4
EJECTION FRACTION: 68 %
GLUCOSE BLD MANUAL STRIP-MCNC: 117 MG/DL (ref 74–99)
GLUCOSE SERPL-MCNC: 105 MG/DL (ref 65–99)
HOLD SPECIMEN: NORMAL
LEFT VENTRICLE INTERNAL DIMENSION DIASTOLE: 3.58 CM (ref 3.5–6)
LEGIONELLA AG UR QL: NEGATIVE
LV EJECTION FRACTION BIPLANE: 70 %
MYCOBACTERIUM SPEC CULT: NORMAL
POTASSIUM SERPL-SCNC: 3.3 MMOL/L (ref 3.4–5.1)
PROT SERPL-MCNC: 6.5 G/DL (ref 5.9–7.9)
SODIUM SERPL-SCNC: 137 MMOL/L (ref 133–145)
VANCOMYCIN SERPL-MCNC: 28.8 UG/ML (ref 10–20)

## 2024-09-04 PROCEDURE — 97110 THERAPEUTIC EXERCISES: CPT | Mod: GP,CQ

## 2024-09-04 PROCEDURE — 2500000001 HC RX 250 WO HCPCS SELF ADMINISTERED DRUGS (ALT 637 FOR MEDICARE OP): Performed by: INTERNAL MEDICINE

## 2024-09-04 PROCEDURE — 2500000001 HC RX 250 WO HCPCS SELF ADMINISTERED DRUGS (ALT 637 FOR MEDICARE OP)

## 2024-09-04 PROCEDURE — RXMED WILLOW AMBULATORY MEDICATION CHARGE

## 2024-09-04 PROCEDURE — 99232 SBSQ HOSP IP/OBS MODERATE 35: CPT | Performed by: INTERNAL MEDICINE

## 2024-09-04 PROCEDURE — 2500000004 HC RX 250 GENERAL PHARMACY W/ HCPCS (ALT 636 FOR OP/ED): Mod: JZ

## 2024-09-04 PROCEDURE — 93308 TTE F-UP OR LMTD: CPT | Performed by: INTERNAL MEDICINE

## 2024-09-04 PROCEDURE — 82947 ASSAY GLUCOSE BLOOD QUANT: CPT

## 2024-09-04 PROCEDURE — 2500000001 HC RX 250 WO HCPCS SELF ADMINISTERED DRUGS (ALT 637 FOR MEDICARE OP): Performed by: NURSE PRACTITIONER

## 2024-09-04 PROCEDURE — 80202 ASSAY OF VANCOMYCIN: CPT

## 2024-09-04 PROCEDURE — 93308 TTE F-UP OR LMTD: CPT

## 2024-09-04 PROCEDURE — 36415 COLL VENOUS BLD VENIPUNCTURE: CPT | Performed by: INTERNAL MEDICINE

## 2024-09-04 PROCEDURE — 97535 SELF CARE MNGMENT TRAINING: CPT | Mod: GO,CO

## 2024-09-04 PROCEDURE — 97530 THERAPEUTIC ACTIVITIES: CPT | Mod: GO,CO

## 2024-09-04 PROCEDURE — 2500000004 HC RX 250 GENERAL PHARMACY W/ HCPCS (ALT 636 FOR OP/ED): Performed by: NURSE PRACTITIONER

## 2024-09-04 PROCEDURE — 80053 COMPREHEN METABOLIC PANEL: CPT | Performed by: INTERNAL MEDICINE

## 2024-09-04 RX ORDER — CEFUROXIME AXETIL 500 MG/1
500 TABLET ORAL 2 TIMES DAILY
Status: DISCONTINUED | OUTPATIENT
Start: 2024-09-04 | End: 2024-09-04 | Stop reason: HOSPADM

## 2024-09-04 RX ORDER — VANCOMYCIN 1.75 G/350ML
1250 INJECTION, SOLUTION INTRAVENOUS EVERY 12 HOURS
Status: DISCONTINUED | OUTPATIENT
Start: 2024-09-04 | End: 2024-09-04

## 2024-09-04 RX ORDER — SPIRONOLACTONE 50 MG/1
50 TABLET, FILM COATED ORAL DAILY
Qty: 30 TABLET | Refills: 0 | Status: ON HOLD | OUTPATIENT
Start: 2024-09-05

## 2024-09-04 RX ORDER — MIRTAZAPINE 15 MG/1
30 TABLET, FILM COATED ORAL NIGHTLY
Status: DISCONTINUED | OUTPATIENT
Start: 2024-09-04 | End: 2024-09-04 | Stop reason: HOSPADM

## 2024-09-04 RX ORDER — DOXYCYCLINE 100 MG/1
100 CAPSULE ORAL EVERY 12 HOURS SCHEDULED
Status: DISCONTINUED | OUTPATIENT
Start: 2024-09-04 | End: 2024-09-04 | Stop reason: HOSPADM

## 2024-09-04 RX ORDER — CEFUROXIME AXETIL 500 MG/1
500 TABLET ORAL 2 TIMES DAILY
Qty: 14 TABLET | Refills: 0 | Status: ON HOLD | OUTPATIENT
Start: 2024-09-04 | End: 2024-09-11

## 2024-09-04 RX ORDER — DOXYCYCLINE HYCLATE 100 MG
100 TABLET ORAL EVERY 12 HOURS
Qty: 14 TABLET | Refills: 0 | Status: ON HOLD | OUTPATIENT
Start: 2024-09-04 | End: 2024-09-11

## 2024-09-04 RX ORDER — FUROSEMIDE 40 MG/1
40 TABLET ORAL
Qty: 60 TABLET | Refills: 0 | Status: ON HOLD | OUTPATIENT
Start: 2024-09-04

## 2024-09-04 RX ORDER — MIRTAZAPINE 15 MG/1
15 TABLET, FILM COATED ORAL NIGHTLY
Status: DISCONTINUED | OUTPATIENT
Start: 2024-09-04 | End: 2024-09-04

## 2024-09-04 RX ORDER — CEFUROXIME AXETIL 500 MG/1
500 TABLET ORAL 2 TIMES DAILY
Status: DISCONTINUED | OUTPATIENT
Start: 2024-09-04 | End: 2024-09-04

## 2024-09-04 RX ORDER — MIRTAZAPINE 30 MG/1
30 TABLET, FILM COATED ORAL NIGHTLY
Qty: 30 TABLET | Refills: 0 | Status: ON HOLD | OUTPATIENT
Start: 2024-09-04

## 2024-09-04 ASSESSMENT — COGNITIVE AND FUNCTIONAL STATUS - GENERAL
PERSONAL GROOMING: A LITTLE
MOVING TO AND FROM BED TO CHAIR: A LITTLE
TURNING FROM BACK TO SIDE WHILE IN FLAT BAD: A LITTLE
DAILY ACTIVITIY SCORE: 16
EATING MEALS: A LITTLE
MOBILITY SCORE: 17
PERSONAL GROOMING: A LITTLE
STANDING UP FROM CHAIR USING ARMS: A LITTLE
TOILETING: A LOT
MOVING FROM LYING ON BACK TO SITTING ON SIDE OF FLAT BED WITH BEDRAILS: A LITTLE
DRESSING REGULAR LOWER BODY CLOTHING: A LOT
STANDING UP FROM CHAIR USING ARMS: A LITTLE
CLIMB 3 TO 5 STEPS WITH RAILING: A LOT
DRESSING REGULAR UPPER BODY CLOTHING: A LITTLE
MOVING TO AND FROM BED TO CHAIR: A LITTLE
DRESSING REGULAR UPPER BODY CLOTHING: A LITTLE
MOVING FROM LYING ON BACK TO SITTING ON SIDE OF FLAT BED WITH BEDRAILS: A LITTLE
MOBILITY SCORE: 17
HELP NEEDED FOR BATHING: A LITTLE
HELP NEEDED FOR BATHING: A LITTLE
TOILETING: A LITTLE
DRESSING REGULAR LOWER BODY CLOTHING: A LITTLE
WALKING IN HOSPITAL ROOM: A LITTLE
DAILY ACTIVITIY SCORE: 19
TURNING FROM BACK TO SIDE WHILE IN FLAT BAD: A LITTLE
CLIMB 3 TO 5 STEPS WITH RAILING: A LOT
WALKING IN HOSPITAL ROOM: A LITTLE

## 2024-09-04 ASSESSMENT — PAIN SCALES - GENERAL
PAINLEVEL_OUTOF10: 0 - NO PAIN
PAINLEVEL_OUTOF10: 0 - NO PAIN
PAINLEVEL_OUTOF10: 10 - WORST POSSIBLE PAIN
PAINLEVEL_OUTOF10: 0 - NO PAIN

## 2024-09-04 ASSESSMENT — PAIN - FUNCTIONAL ASSESSMENT
PAIN_FUNCTIONAL_ASSESSMENT: WONG-BAKER FACES
PAIN_FUNCTIONAL_ASSESSMENT: 0-10

## 2024-09-04 ASSESSMENT — ACTIVITIES OF DAILY LIVING (ADL): HOME_MANAGEMENT_TIME_ENTRY: 10

## 2024-09-04 ASSESSMENT — PAIN DESCRIPTION - DESCRIPTORS: DESCRIPTORS: ACHING;DISCOMFORT

## 2024-09-04 ASSESSMENT — PAIN DESCRIPTION - ORIENTATION: ORIENTATION: MID

## 2024-09-04 ASSESSMENT — PAIN DESCRIPTION - LOCATION: LOCATION: ABDOMEN

## 2024-09-04 NOTE — CARE PLAN
The patient's goals for the shift include Really wants to eat...asks for order    The clinical goals for the shift include pain management    Over the shift, the patient did not make progress toward the following goals. Barriers to progression include pain. Recommendations to address these barriers include manage pain.

## 2024-09-04 NOTE — PROGRESS NOTES
Vancomycin Dosing by Pharmacy- Cessation of Therapy    Consult to pharmacy for vancomycin dosing has been discontinued by the prescriber, pharmacy will sign off at this time.    Please call pharmacy if there are further questions or re-enter a consult if vancomycin is resumed.     Edmund Cutler, ShelleyD

## 2024-09-04 NOTE — NURSING NOTE
Assumed care of pt around this time.  Pt is alert, lying in bed, watching tv.  Respirations even and unlabored on room air with no s/s of distress.  IV fluids running.  Telemetry monitoring in place.  Pt denies any pain or other needs at the moment.  Call light and belongings within reach.  Will be continuing to monitor.

## 2024-09-04 NOTE — PROGRESS NOTES
"   Pulmonary Progress Note    Nils Hogan is a 54 y.o. male on day 6 of admission presenting with Generalized abdominal pain.    Subjective   No acute events  Objective   Vital Signs      9/3/2024    10:54 AM 9/3/2024     3:56 PM 9/3/2024     7:46 PM 9/3/2024     9:59 PM 9/4/2024    12:32 AM 9/4/2024     3:59 AM 9/4/2024     8:41 AM   Vitals   Systolic 110 93 102 100 132 107 99   Diastolic 59 79 56 61 71 78 54   Heart Rate  93 90 87 93  92   Temp 36.5 °C (97.7 °F) 36.7 °C (98.1 °F) 36.8 °C (98.2 °F)  37 °C (98.6 °F) 37.3 °C (99.1 °F) 36.8 °C (98.2 °F)   Resp 16 16 20  22 18 18   Height (in) 1.626 m (5' 4.02\")         Weight (lb) 154.32     158.29    BMI 26.48 kg/m2     27.16 kg/m2    BSA (m2) 1.78 m2     1.8 m2        Oxygen Therapy  SpO2: 94 %  Medical Gas Therapy: None (Room air)  Medical Gas Delivery Method: Nasal cannula         Intake/Output previous 24 hours:    Intake/Output Summary (Last 24 hours) at 9/4/2024 1100  Last data filed at 9/4/2024 0659  Gross per 24 hour   Intake 1960 ml   Output 300 ml   Net 1660 ml       Physical Exam  ...  Lines and Tubes:  Peripheral IV 08/29/24 20 G Left;Ventral Forearm (Active)   Placement Date: 08/29/24   Size (Gauge): 20 G  Orientation: Left;Ventral  Location: Forearm   Number of days: 6         Scheduled medications  aspirin, 81 mg, oral, Daily  doxycycline, 100 mg, intravenous, q12h  [Held by provider] enoxaparin, 40 mg, subcutaneous, Daily  furosemide, 40 mg, oral, BID  iron sucrose, 200 mg, intravenous, Once  meropenem, 1 g, intravenous, q8h  metoprolol tartrate, 25 mg, oral, BID  mirtazapine, 30 mg, oral, Nightly  pantoprazole, 40 mg, oral, Daily before breakfast  spironolactone, 50 mg, oral, Daily  sucralfate, 1 g, oral, Before meals & nightly  vancomycin, 1,500 mg, intravenous, q12h      Continuous medications     PRN medications  PRN medications: ALPRAZolam, ondansetron **OR** ondansetron, oxyCODONE, polyethylene glycol, vancomycin    Relevant Results  Results " from last 7 days   Lab Units 09/01/24  0654 08/31/24  0552 08/30/24  0450   WBC AUTO x10*3/uL 5.8 6.1 5.0   HEMOGLOBIN g/dL 12.6* 10.8* 10.8*   HEMATOCRIT % 36.9* 31.4* 31.8*   PLATELETS AUTO x10*3/uL 96* 105* 97*      Results from last 7 days   Lab Units 09/04/24  0449 09/01/24  0654 08/31/24  0553   SODIUM mmol/L 137 134 135   POTASSIUM mmol/L 3.3* 3.6 3.7   CHLORIDE mmol/L 99 99 101   CO2 mmol/L 30 26 26   BUN mg/dL 5* 4* 5*   CREATININE mg/dL 0.30* 0.40 0.40   GLUCOSE mg/dL 105* 93 100*   CALCIUM mg/dL 7.9* 7.7* 7.6*          XR chest 1 view 09/03/2024    Narrative  Interpreted By:  Oralia Jackman,  STUDY:  XR CHEST 1 VIEW 9/3/2024 2:31 pm    INDICATION:  Status post thoracentesis    COMPARISON:  08/29/2024    ACCESSION NUMBER(S):  [id number]    ORDERING CLINICIAN:  TASIA SEVERINO    TECHNIQUE:  AP semi-erect view of the chest at bedside    FINDINGS:  Ultrasound-guided left thoracentesis has been performed with  considerable decrease in size of the left pleural effusion. No  pneumothorax is seen.    Impression  No pneumothorax following ultrasound-guided thoracentesis on the  left. There is near complete evacuation of the left pleural effusion.    Signed by: Oralia Jackman 9/3/2024 4:09 PM  Dictation workstation:   [id number]      Patient Active Problem List   Diagnosis    Abdominal pain    Lactic acidosis    Diarrhea    Ventral hernia without obstruction or gangrene    Hypokalemia    Small bowel obstruction (Multi)    Fall    Generalized abdominal pain     Assessment/Plan   Large left pleural effusion - accumulating rapidly likely   Atelectasis in the right lung and to a larger extend in the left lung   Hypoxia secondary to the atelectasis    Stable post thora  Echo with normal EF  Stable on room air    Follow up with PCP / outpatient pulmonary  Chest xray ion one week    Can be discharged if ID can switch to oral abx      Tasia Severino MD

## 2024-09-04 NOTE — PROGRESS NOTES
Nils Richardson is a 54 y.o. male on day 6 of admission presenting with Generalized abdominal pain.      Subjective   Better today, not on o2 wants to go home       Objective     Last Recorded Vitals  BP 99/54 (BP Location: Left arm, Patient Position: Lying)   Pulse 92   Temp 36.8 °C (98.2 °F) (Oral)   Resp 18   Wt 71.8 kg (158 lb 4.6 oz)   SpO2 94%   Intake/Output last 3 Shifts:    Intake/Output Summary (Last 24 hours) at 9/4/2024 1046  Last data filed at 9/4/2024 0659  Gross per 24 hour   Intake 1960 ml   Output 500 ml   Net 1460 ml       Admission Weight  Weight: 71 kg (156 lb 8.4 oz) (08/29/24 1424)    Daily Weight  09/04/24 : 71.8 kg (158 lb 4.6 oz)    Image Results  XR chest 1 view  Narrative: Interpreted By:  Oralia Jackman,   STUDY:  XR CHEST 1 VIEW 9/3/2024 2:31 pm      INDICATION:  Status post thoracentesis      COMPARISON:  08/29/2024      ACCESSION NUMBER(S):  [id number]      ORDERING CLINICIAN:  TASIA SEVERINO      TECHNIQUE:  AP semi-erect view of the chest at bedside      FINDINGS:  Ultrasound-guided left thoracentesis has been performed with  considerable decrease in size of the left pleural effusion. No  pneumothorax is seen.      Impression: No pneumothorax following ultrasound-guided thoracentesis on the  left. There is near complete evacuation of the left pleural effusion.      Signed by: Oralia Jackman 9/3/2024 4:09 PM  Dictation workstation:   [id number]  Transthoracic Echo (TTE) [place] [address]             Phone [phone]    TRANSTHORACIC ECHOCARDIOGRAM REPORT    Patient Name:     NILS RICHARDSON        Reading Physician:   [id number] Milan Nicole DO  Study Date:       9/3/2024             Ordering Provider:   [id number] TASIA SEVERINO  MRN/PID:          [id number]             Fellow:  Accession#:        [id number]         Nurse:  Date of           1970 / 54 years Sonographer:         Carri Cari  Birth/Age:  Gender:           M                    Additional Staff:  Height:           162.56 cm            Admit Date:  Weight:           69.85 kg             Admission Status:    Inpatient - Routine  BSA / BMI:        1.75 m2 / 26.43      Department Location: [facility]                    kg/m2  Blood Pressure: 110 /59 mmHg    Study Type:    TRANSTHORACIC ECHO (TTE) COMPLETE  Diagnosis/ICD: Other secondary hypertension-I15.8  CPT Codes:     Echo Complete w Full [id number]   Study Detail: The following Echo studies were performed: 2D, M-Mode, Doppler,                color flow, Strain and 3D.       PHYSICIAN INTERPRETATION:  Left Ventricle: Left ventricular ejection fraction is normal, by visual estimate at 60-65%. There are no regional wall motion abnormalities. The left ventricular cavity size is normal. Left Ventricular Global Longitudinal Strain - -22.5 %. Spectral Doppler shows an impaired relaxation pattern of left ventricular diastolic filling.  Left Atrium: The left atrium is normal in size.  Right Ventricle: The right ventricle is normal in size. There is normal right ventricular global systolic function.  Right Atrium: The right atrium is normal in size.  Aortic Valve: The aortic valve is trileaflet. The aortic valve dimensionless index is 1.01. There is no evidence of aortic valve regurgitation. The peak instantaneous gradient of the aortic valve is 6.1 mmHg. The mean gradient of the aortic valve is 4.0 mmHg.  Mitral Valve: The mitral valve is normal in structure. There is trace mitral valve regurgitation.  Tricuspid Valve: The tricuspid valve is structurally normal. There is trace tricuspid regurgitation.  Pulmonic Valve: The pulmonic valve is not well visualized. The pulmonic valve regurgitation was not well visualized.  Pericardium: There is no pericardial effusion noted.  Aorta: The aortic  root is normal.       CONCLUSIONS:   1. Left ventricular ejection fraction is normal, by visual estimate at 60-65%.   2. Spectral Doppler shows an impaired relaxation pattern of left ventricular diastolic filling.   3. There is normal right ventricular global systolic function.    QUANTITATIVE DATA SUMMARY:  2D MEASUREMENTS:                           Normal Ranges:  IVSd:          1.08 cm   (0.6-1.1cm)  LVPWd:         0.74 cm   (0.6-1.1cm)  LVIDd:         4.37 cm   (3.9-5.9cm)  LVIDs:         2.67 cm  LV Mass Index: 73.5 g/m2  LV % FS        38.9 %    LA VOLUME:                               Normal Ranges:  LA Volume Index:  38.3 ml/m2  LA Vol A4C:       63.4 ml  LA Vol A2C:       70.6 ml  LA Vol Index BSA: 38.3 ml/m2    RA VOLUME BY A/L METHOD:                        Normal Ranges:  RA Area A4C: 10.1 cm2    AORTA MEASUREMENTS:                       Normal Ranges:  Ao Sinus, d: 3.20 cm (2.1-3.5cm)  Ao STJ, d:   2.09 cm (1.7-3.4cm)  Asc Ao, d:   2.80 cm (2.1-3.4cm)    LV SYSTOLIC FUNCTION BY 2D PLANIMETRY (MOD):                                          Normal Ranges:  EF-A4C View:                      65 %  (>=55%)  EF-A2C View:                      68 %  EF-Biplane:                       67 %  EF-Visual:                        63 %  EF-Auto:                          66 %  LV EF Reported:                   63 %  Global Longitudinal Strain (GLS): -22 %    LV DIASTOLIC FUNCTION:                          Normal Ranges:  MV Peak E:    0.90 m/s  (0.7-1.2 m/s)  MV Peak A:    0.61 m/s  (0.42-0.7 m/s)  E/A Ratio:    1.48      (1.0-2.2)  MV e'         0.101 m/s (>8.0)  MV lateral e' 0.11 m/s  MV medial e'  0.09 m/s  E/e' Ratio:   8.93      (<8.0)    MITRAL VALVE:                  Normal Ranges:  MV DT: 220 msec (150-240msec)    AORTIC VALVE:                                    Normal Ranges:  AoV Vmax:                1.23 m/s (<=1.7m/s)  AoV Peak P.1 mmHg (<20mmHg)  AoV Mean P.0 mmHg  (1.7-11.5mmHg)  LVOT Max Vickey:            1.23 m/s (<=1.1m/s)  AoV VTI:                 28.20 cm (18-25cm)  LVOT VTI:                28.50 cm  LVOT Diameter:           2.00 cm  (1.8-2.4cm)  AoV Area, VTI:           3.18 cm2 (2.5-5.5cm2)  AoV Area,Vmax:           3.14 cm2 (2.5-4.5cm2)  AoV Dimensionless Index: 1.01       RIGHT VENTRICLE:  RV Basal 2.78 cm  TAPSE:   29.1 mm  RV s'    0.12 m/s       [id number] Milan Nicole   Electronically signed on 9/3/2024 at 2:01:47 PM       ** Final **  US thoracentesis  Narrative: Interpreted By:  Virgilio Davila,   STUDY:  US THORACENTESIS; 8/30/2024 2:46 pm      INDICATION:  Left pleural effusion      COMPARISON:  None      ACCESSION NUMBER(S):  [id number]      ORDERING CLINICIAN:  VIRGILIO DAVILA      TECHNIQUE:  Informed consent obtained. Patient positionedsitting upright. Skin  prepped, draped and anesthetized. Under ultrasound guidance, a  centesis catheter/needle was advanced into leftpleural cavity.      FINDINGS:  A total of 1.7 L of clear yellow fluid was aspirated. A sample was  sent for analysis. The patient tolerated the procedure well.      Impression: Ultrasound-guided left thoracentesis.      Signed by: Virgilio Davila 9/3/2024 10:00 AM  Dictation workstation:   [id number]  US guided abdominal paracentesis  Narrative: Interpreted By:  Virgilio Davila,   STUDY:  US GUIDED ABDOMINAL PARACENTESIS; 8/30/2024 2:46 pm      INDICATION:  Signs/Symptoms:ascites.      COMPARISON:  None.      ACCESSION NUMBER(S):  [id number]      ORDERING CLINICIAN:  VIRGILIO DAVILA      TECHNIQUE:  Ultrasound-guided paracentesis      FINDINGS:  Informed consent obtained. Patient positioned supine. Right lower  quadrant prepped, draped and anesthetized. Under ultrasound guidance,  8 Vietnamese centesis sheath needle inserted into peritoneal cavity. 1.5  Lof clear yellowfluid aspirated. Patient tolerated procedure well      Impression: Ultrasound-guided paracentesis.      Signed by: Virgilio Davila  9/3/2024 9:59 AM  Dictation workstation:   [id number]      Physical Exam/better    Relevant Results               Assessment/Plan                  Assessment & Plan  Generalized abdominal pain    Dc if cleared by pulmonary              Barbara Brunson MD

## 2024-09-04 NOTE — PROGRESS NOTES
"Nils Hogan is a 54 y.o. male on day 6 of admission presenting with Generalized abdominal pain.    Subjective   Interval History:   Afebrile, no chills  No significant cough  No chest pain  No shortness of breath at rest  No nausea vomiting or diarrhea        Review of Systems   All other systems reviewed and are negative.      Objective   Range of Vitals (last 24 hours)  Heart Rate:  [87-96]   Temp:  [36.5 °C (97.7 °F)-37.3 °C (99.1 °F)]   Resp:  [16-22]   BP: ()/(54-79)   Height:  [162.6 cm (5' 4.02\")]   Weight:  [70 kg (154 lb 5.2 oz)-71.8 kg (158 lb 4.6 oz)]   SpO2:  [93 %-100 %]   Daily Weight  09/04/24 : 71.8 kg (158 lb 4.6 oz)    Body mass index is 27.16 kg/m².    Physical Exam  Constitutional:       Appearance: Normal appearance.   HENT:      Head: Normocephalic and atraumatic.      Nose: Nose normal.      Mouth/Throat:      Mouth: Mucous membranes are moist.      Pharynx: Oropharynx is clear.   Eyes:      General: Scleral icterus present.   Cardiovascular:      Rate and Rhythm: Normal rate and regular rhythm.   Pulmonary:      Effort: Pulmonary effort is normal.      Breath sounds: Normal breath sounds.   Abdominal:      General: Bowel sounds are normal. There is distension.      Palpations: Abdomen is soft.      Hernia: A hernia is present.      Comments: Small healing abdominal wound   Musculoskeletal:         General: Normal range of motion.      Cervical back: Normal range of motion and neck supple.   Skin:     General: Skin is warm and dry.      Coloration: Skin is jaundiced.   Neurological:      General: No focal deficit present.      Mental Status: He is alert and oriented to person, place, and time. Mental status is at baseline.   Psychiatric:         Mood and Affect: Mood normal.         Behavior: Behavior normal.     Antibiotics  cephalexin - 500 mg  doxycycline (Vibramycin)  mg in 100 mL D5W (ADV/MBP)  meropenem - 1 gram/100 mL  vancomycin - 1.5 gram/300 mL    Relevant " Results  Labs      Results from last 72 hours   Lab Units 09/04/24  0449   SODIUM mmol/L 137   POTASSIUM mmol/L 3.3*   CHLORIDE mmol/L 99   CO2 mmol/L 30   BUN mg/dL 5*   CREATININE mg/dL 0.30*   GLUCOSE mg/dL 105*   CALCIUM mg/dL 7.9*   ANION GAP mmol/L 8   EGFR mL/min/1.73m*2 >90     Results from last 72 hours   Lab Units 09/04/24  0449   ALK PHOS U/L 149*   BILIRUBIN TOTAL mg/dL 5.0*   PROTEIN TOTAL g/dL 6.5   ALT U/L 22   AST U/L 98*   ALBUMIN g/dL 2.0*     Estimated Creatinine Clearance: 125 mL/min (A) (by C-G formula based on SCr of 0.3 mg/dL (L)).  CRP   Date Value Ref Range Status   03/27/2019 1.6 0 - 2.0 MG/DL Final     Comment:     Performed at [place] [address]     Microbiology    Imaging  Transthoracic Echo (TTE) Limited    Result Date: 9/4/2024           [place] [address]            Phone [phone] TRANSTHORACIC ECHOCARDIOGRAM REPORT Patient Name:     HARPER Ferguson Physician:   [id number] Milan Nicole DO Study Date:       9/4/2024             Ordering Provider:   [id number] ALYX CAMILO MRN/PID:          [id number]             Fellow: Accession#:       [id number]         Nurse: Date of           1970 / 54 years Sonographer:         Darnell Garnica RDCS Birth/Age: Gender:           M                    Additional Staff: Height:           162.56 cm            Admit Date: Weight:           71.67 kg             Admission Status:    Inpatient - Routine BSA / BMI:        1.77 m2 / 27.12      Department Location: [facility]                   kg/m2 Blood Pressure: 107 /78 mmHg Study Type:    TRANSTHORACIC ECHO (TTE) LIMITED Diagnosis/ICD: Shortness of breath-R06.02 Indication:    SOB CPT Codes:     Echo Limited-64612 Patient History: Smoker:            Former. Pertinent History: HTN, Hyperlipidemia,  CVA and Dyspnea. Study Detail: The following Echo studies were performed: 2D. Technically               challenging study due to body habitus and patient lying in supine               position.  PHYSICIAN INTERPRETATION: Left Ventricle: Left ventricular ejection fraction is normal, by visual estimate at 65-70%. There are no regional wall motion abnormalities. The left ventricular cavity size is normal. Spectral Doppler shows a normal pattern of left ventricular diastolic filling. Left Atrium: The left atrium is normal in size. Right Ventricle: The right ventricle is normal in size. There is normal right ventricular global systolic function. Right Atrium: The right atrium is normal in size. Aortic Valve: The aortic valve is trileaflet. There is no evidence of aortic valve regurgitation. Mitral Valve: The mitral valve is normal in structure. Mitral valve regurgitation was not assessed. Tricuspid Valve: The tricuspid valve is structurally normal. Tricuspid regurgitation was not assessed. Pulmonic Valve: The pulmonic valve is not well visualized. The pulmonic valve regurgitation was not well visualized. Pericardium: There is no pericardial effusion noted. Aorta: The aortic root is normal.  CONCLUSIONS:  1. Left ventricular ejection fraction is normal, by visual estimate at 65-70%.  2. There is normal right ventricular global systolic function. QUANTITATIVE DATA SUMMARY: 2D MEASUREMENTS:                          Normal Ranges: IVSd:          0.92 cm   (0.6-1.1cm) LVPWd:         0.94 cm   (0.6-1.1cm) LVIDd:         3.58 cm   (3.9-5.9cm) LVIDs:         2.44 cm LV Mass Index: 54.0 g/m2 LV % FS        31.8 % LV SYSTOLIC FUNCTION BY 2D PLANIMETRY (MOD):                      Normal Ranges: EF-A4C View:    74 % (>=55%) EF-A2C View:    60 % EF-Biplane:     70 % EF-Visual:      68 % LV EF Reported: 68 %  [id number] Milan Nicole DO Electronically signed on 9/4/2024 at 2:02:24 PM  ** Final **     US thoracentesis    Result Date:  9/4/2024  Interpreted By:  Virgilio Davila, STUDY: US THORACENTESIS; 9/3/2024 10:42 am   INDICATION: Left pleural effusion.   COMPARISON: None   ACCESSION NUMBER(S): [id number]   ORDERING CLINICIAN: ALYX CAMILO   TECHNIQUE: Informed consent obtained. Patient positionedsitting upright. Skin prepped, draped and anesthetized. Under ultrasound guidance, a centesis catheter/needle was advanced into leftpleural cavity.   FINDINGS: A total of 1.3 L of clear turner fluid was aspirated. The patient tolerated the procedure well.       Ultrasound-guided left thoracentesis.   Signed by: Virgilio Davila 9/4/2024 12:25 PM Dictation workstation:   [id number]    XR chest 1 view    Result Date: 9/3/2024  Interpreted By:  Oralia Jackman, STUDY: XR CHEST 1 VIEW 9/3/2024 2:31 pm   INDICATION: Status post thoracentesis   COMPARISON: 08/29/2024   ACCESSION NUMBER(S): [id number]   ORDERING CLINICIAN: TASIA SEVERINO   TECHNIQUE: AP semi-erect view of the chest at bedside   FINDINGS: Ultrasound-guided left thoracentesis has been performed with considerable decrease in size of the left pleural effusion. No pneumothorax is seen.       No pneumothorax following ultrasound-guided thoracentesis on the left. There is near complete evacuation of the left pleural effusion.   Signed by: Oralia Jackman 9/3/2024 4:09 PM Dictation workstation:   [id number]    Transthoracic Echo (TTE) Complete    Result Date: 9/3/2024           [place] [address]            Phone [phone] TRANSTHORACIC ECHOCARDIOGRAM REPORT Patient Name:     HARPER Ferguson Physician:   [id number] Milan Nicole DO Study Date:       9/3/2024             Ordering Provider:   [id number] TASIA SEVERINO MRN/PID:          [id number]             Fellow: Accession#:       [id number]         Nurse: Date of           1970  / 54 years Sonographer:         Carri Alcala Birth/Age: Gender:           M                    Additional Staff: Height:           162.56 cm            Admit Date: Weight:           69.85 kg             Admission Status:    Inpatient - Routine BSA / BMI:        1.75 m2 / 26.43      Department Location: [facility]                   kg/m2 Blood Pressure: 110 /59 mmHg Study Type:    TRANSTHORACIC ECHO (TTE) COMPLETE Diagnosis/ICD: Other secondary hypertension-I15.8 CPT Codes:     Echo Complete w Full Doppler-21683  Study Detail: The following Echo studies were performed: 2D, M-Mode, Doppler,               color flow, Strain and 3D.  PHYSICIAN INTERPRETATION: Left Ventricle: Left ventricular ejection fraction is normal, by visual estimate at 60-65%. There are no regional wall motion abnormalities. The left ventricular cavity size is normal. Left Ventricular Global Longitudinal Strain - -22.5 %. Spectral Doppler shows an impaired relaxation pattern of left ventricular diastolic filling. Left Atrium: The left atrium is normal in size. Right Ventricle: The right ventricle is normal in size. There is normal right ventricular global systolic function. Right Atrium: The right atrium is normal in size. Aortic Valve: The aortic valve is trileaflet. The aortic valve dimensionless index is 1.01. There is no evidence of aortic valve regurgitation. The peak instantaneous gradient of the aortic valve is 6.1 mmHg. The mean gradient of the aortic valve is 4.0 mmHg. Mitral Valve: The mitral valve is normal in structure. There is trace mitral valve regurgitation. Tricuspid Valve: The tricuspid valve is structurally normal. There is trace tricuspid regurgitation. Pulmonic Valve: The pulmonic valve is not well visualized. The pulmonic valve regurgitation was not well visualized. Pericardium: There is no pericardial effusion noted. Aorta: The aortic root is normal.  CONCLUSIONS:  1. Left ventricular ejection fraction is normal, by  visual estimate at 60-65%.  2. Spectral Doppler shows an impaired relaxation pattern of left ventricular diastolic filling.  3. There is normal right ventricular global systolic function. QUANTITATIVE DATA SUMMARY: 2D MEASUREMENTS:                          Normal Ranges: IVSd:          1.08 cm   (0.6-1.1cm) LVPWd:         0.74 cm   (0.6-1.1cm) LVIDd:         4.37 cm   (3.9-5.9cm) LVIDs:         2.67 cm LV Mass Index: 73.5 g/m2 LV % FS        38.9 % LA VOLUME:                              Normal Ranges: LA Volume Index:  38.3 ml/m2 LA Vol A4C:       63.4 ml LA Vol A2C:       70.6 ml LA Vol Index BSA: 38.3 ml/m2 RA VOLUME BY A/L METHOD:                       Normal Ranges: RA Area A4C: 10.1 cm2 AORTA MEASUREMENTS:                      Normal Ranges: Ao Sinus, d: 3.20 cm (2.1-3.5cm) Ao STJ, d:   2.09 cm (1.7-3.4cm) Asc Ao, d:   2.80 cm (2.1-3.4cm) LV SYSTOLIC FUNCTION BY 2D PLANIMETRY (MOD):                                         Normal Ranges: EF-A4C View:                      65 %  (>=55%) EF-A2C View:                      68 % EF-Biplane:                       67 % EF-Visual:                        63 % EF-Auto:                          66 % LV EF Reported:                   63 % Global Longitudinal Strain (GLS): -22 % LV DIASTOLIC FUNCTION:                         Normal Ranges: MV Peak E:    0.90 m/s  (0.7-1.2 m/s) MV Peak A:    0.61 m/s  (0.42-0.7 m/s) E/A Ratio:    1.48      (1.0-2.2) MV e'         0.101 m/s (>8.0) MV lateral e' 0.11 m/s MV medial e'  0.09 m/s E/e' Ratio:   8.93      (<8.0) MITRAL VALVE:                 Normal Ranges: MV DT: 220 msec (150-240msec) AORTIC VALVE:                                   Normal Ranges: AoV Vmax:                1.23 m/s (<=1.7m/s) AoV Peak P.1 mmHg (<20mmHg) AoV Mean P.0 mmHg (1.7-11.5mmHg) LVOT Max Vickey:            1.23 m/s (<=1.1m/s) AoV VTI:                 28.20 cm (18-25cm) LVOT VTI:                28.50 cm LVOT Diameter:           2.00  cm  (1.8-2.4cm) AoV Area, VTI:           3.18 cm2 (2.5-5.5cm2) AoV Area,Vmax:           3.14 cm2 (2.5-4.5cm2) AoV Dimensionless Index: 1.01  RIGHT VENTRICLE: RV Basal 2.78 cm TAPSE:   29.1 mm RV s'    0.12 m/s  [id number] Milan Nicole DO Electronically signed on 9/3/2024 at 2:01:47 PM  ** Final **     US thoracentesis    Result Date: 9/3/2024  Interpreted By:  Francisco Javier Davila, STUDY: US THORACENTESIS; 8/30/2024 2:46 pm   INDICATION: Left pleural effusion   COMPARISON: None   ACCESSION NUMBER(S): [id number]   ORDERING CLINICIAN: FRANCISCO JAVIER DAVILA   TECHNIQUE: Informed consent obtained. Patient positionedsitting upright. Skin prepped, draped and anesthetized. Under ultrasound guidance, a centesis catheter/needle was advanced into leftpleural cavity.   FINDINGS: A total of 1.7 L of clear yellow fluid was aspirated. A sample was sent for analysis. The patient tolerated the procedure well.       Ultrasound-guided left thoracentesis.   Signed by: Francisco Javier Davila 9/3/2024 10:00 AM Dictation workstation:   [id number]    US guided abdominal paracentesis    Result Date: 9/3/2024  Interpreted By:  Francisco Javier Davila, STUDY: US GUIDED ABDOMINAL PARACENTESIS; 8/30/2024 2:46 pm   INDICATION: Signs/Symptoms:ascites.   COMPARISON: None.   ACCESSION NUMBER(S): [id number]   ORDERING CLINICIAN: FRANCISCO JAVIER DAVILA   TECHNIQUE: Ultrasound-guided paracentesis   FINDINGS: Informed consent obtained. Patient positioned supine. Right lower quadrant prepped, draped and anesthetized. Under ultrasound guidance, 8 Guyanese centesis sheath needle inserted into peritoneal cavity. 1.5 Lof clear yellowfluid aspirated. Patient tolerated procedure well       Ultrasound-guided paracentesis.   Signed by: Francisco Javier Davila 9/3/2024 9:59 AM Dictation workstation:   [id number]    CT chest wo IV contrast    Result Date: 9/2/2024  Interpreted By:  Ai Gil, STUDY: CT CHEST WO IV CONTRAST;  9/2/2024 10:18 am   INDICATION: Signs/Symptoms:hypoxia.     COMPARISON: Chest  x-ray 08/29/2024 and CT abdomen pelvis 08/29/2024   ACCESSION NUMBER(S): [id number]   ORDERING CLINICIAN: ALYX CAMILO   TECHNIQUE: Helical data acquisition of the chest was obtained  without IV contrast material.  Images were reformatted in axial, coronal, and sagittal planes. Images extend through the abdomen to the level of the iliac crest.   FINDINGS: LUNGS AND AIRWAYS: The trachea and central airways are patent. No endobronchial lesion.   Large left pleural effusion is only minimally decreased from the prior chest x-ray, with interval thoracentesis removing 1.7 L of fluid on 08/30/2024. Compressive atelectasis involves most of the left lower lobe. Some mild patchy ground-glass opacities are present in the left upper lobe.   There is minimal pleural fluid present on the right. A small peripheral infiltrate is present posterolateral aspect of the right middle lobe and there is some minimal patchy ground-glass opacity through the right lung.   MEDIASTINUM AND HANANE, LOWER NECK AND AXILLA: The visualized thyroid gland is within normal limits.   No evidence of thoracic lymphadenopathy by CT criteria.   Esophagus appears within normal limits as seen.   HEART AND VESSELS: The thoracic aorta is of normal course and caliber with mild patchy vascular calcifications.   Main pulmonary artery and its branches are normal in caliber.   Patchy coronary artery calcifications are seen. The study is not optimized for evaluation of coronary arteries.   The cardiac chambers are not enlarged.   No evidence of pericardial effusion.   ABDOMEN: Liver is unchanged in size and contour with prominence of the left lobe and heterogeneity. Pneumobilia is unchanged. Gallbladder is absent.   Patient is status post Whipple procedure. The remaining portion of the pancreas is severely atrophic. There is no inflammation or gross mass.   Spleen is mildly enlarged measuring about 13 x 13 x 7 cm.   Adrenal glands appear normal.   No hydronephrosis,  stone or gross mass is noted involving the kidneys.   Included bowel loops are not dilated. The large ventral hernia is partially excluded from field of view and appears grossly unchanged. Ascites is similar in volume to the prior study. There is no gross pneumoperitoneum noted.   Aorta shows no aneurysmal dilatation. Vena cava appears normal in size.   CHEST WALL AND OSSEOUS STRUCTURES: No acute or suspicious osseous abnormality is noted. There is mild bilateral gynecomastia.       1.  Large left pleural effusion with severe compressive atelectasis of the left lower lobe 2. Small patchy infiltrates in both lungs could represent pneumonia 3. Ascites is similar to the prior study. Once again there is a cirrhotic appearance of the liver and mild splenomegaly without change. With regard to the vague left hepatic lobe abnormality on the prior study, this is even more nondescript on the current exam. 4. Patient is status post Whipple procedure 5. Large midline ventral hernia containing bowel is incompletely included in the field of view but grossly unchanged   MACRO: None   Signed by: Ai Gil 9/2/2024 11:39 AM Dictation workstation:   [id number]    XR chest 1 view    Result Date: 8/29/2024  Interpreted By:  Holden Salinas, STUDY: XR CHEST 1 VIEW;  8/29/2024 6:04 pm   INDICATION: Signs/Symptoms:evaluate for pleural effusion seen on ct abd.   COMPARISON: Chest radiograph 06/08/2024   ACCESSION NUMBER(S): [id number]   ORDERING CLINICIAN: DILLON KNUZ   FINDINGS:     CARDIOMEDIASTINAL SILHOUETTE: Cardiomediastinal silhouette is stable in size and configuration.   LUNGS: Large left-sided pleural effusion with left lower lung airspace opacification.   ABDOMEN: No remarkable upper abdominal findings.   BONES: No acute osseous changes.       1.  Large left-sided pleural effusion.     Signed by: Holden Salinas 8/29/2024 6:13 PM Dictation workstation:   [id number]    CT abdomen pelvis wo IV contrast    Result Date:  8/29/2024  Interpreted By:  Tamir Luevano, STUDY: CT ABDOMEN PELVIS WO IV CONTRAST; 8/29/2024 3:42 pm   INDICATION: Signs/Symptoms:abdominal pain, hx of ascites   COMPARISON: 06/05/2024.   ACCESSION NUMBER(S): [id number]   ORDERING CLINICIAN: SURINDER LAZO   TECHNIQUE: Spiral axial unenhanced images were obtained from the upper abdomen to the symphysis pubis. Oral contrast was not administered.   All CT examinations are performed with one or more of the following dose reduction techniques: Automated Exposure Control, adjustment of mA and/or kV according to patient size, or use of iterative reconstruction techniques.   FINDINGS: Lung bases: There is interval significant worsening of large left pleural effusion with left basilar atelectatic changes/infiltrates. Liver: Heterogenous cirrhotic liver with vague hypodensity again noted in the left lobe, similar to prior exam. Mild pneumobilia again seen. Associated worsening ascites in the abdomen and pelvis. Spleen: Splenomegaly again noted, without focal lesions. Pancreas: Previous Whipple's procedure, similar to prior exam. Associated prominent peripancreatic/periportal lymph nodes, similar to prior exam. Adrenal glands: No focal lesions. The kidneys are normal in size and position. There are no renal calculi or hydronephrosis. No abnormal calcifications are seen within the course of the ureters or within the partially distended bladder. Pelvic reproductive organs: Small calcifications again seen in the prostate gland.   Several colonic diverticula are again seen, without acute diverticulitis. There is again large ventral hernia containing several small bowel loops, without strangulation or bowel obstruction. Atherosclerotic calcifications involve the aorta and its branches, without focal aneurysm. Degenerative changes involve the L5-S1 level of the lumbar spine.       1. Interval significant worsening of large left pleural effusion left basilar  infiltrates/atelectasis; correlate clinically and follow-up as needed. 2. Heterogenous cirrhotic liver with pneumobilia and stable hypodense lesion in the left lobe. Splenomegaly. Worsening ascites in the abdomen and pelvis. 3.  previous Whipple's procedure. Associated prominent peripancreatic/periportal lymph nodes. 4. Large ventral hernia again seen, containing several small bowel loops, without strangulation or bowel obstruction. Colonic diverticulosis without acute diverticulitis.   Signed by: Tamir Luevano 8/29/2024 4:15 PM Dictation workstation:   [id number]     Assessment/Plan   Acute hypoxic respiratory failure, resolved  Large left pleural effusion-s/p thoracentesis 9/3/2024  Ascites-s/p paracentesis 9/3/2024, culture pending  Abnormal CT chest-atelectasis,mild ground glass opacities -rule out pneumonia, covid  History Duodenal cancer, s/p Whipple  Allergy zosyn-anaphylactic-has tolerated meropenem, keflex,ceftriaxone     Discontinue vancomycin  Discontinue meropenem  Oral doxycycline  Oral cefuroxime-has tolerated Rocephin in the past  Follow-up pending workup  Monitor temperature and obese  Patient to be discharged on cefuroxime and doxycycline for 7 days      Dario Schroeder MD

## 2024-09-04 NOTE — PROGRESS NOTES
Medication Adjustment    The following medication(s) was/were adjusted for per protocol/policy due to [facility] approved/hospital use guidelines.    Medication(s) adjusted:   Doxycycline IV to PO    -Yessi Siegel, PharmD]

## 2024-09-04 NOTE — CARE PLAN
The patient's goals for the shift include Really wants to eat...asks for order    The clinical goals for the shift include Safety, IV ATB therapy, pain management    Over the shift, the patient did make progress toward the following goals.       Problem: Pain - Adult  Goal: Verbalizes/displays adequate comfort level or baseline comfort level  Outcome: Progressing     Problem: Safety - Adult  Goal: Free from fall injury  Outcome: Progressing     Problem: Discharge Planning  Goal: Discharge to home or other facility with appropriate resources  Outcome: Progressing     Problem: Chronic Conditions and Co-morbidities  Goal: Patient's chronic conditions and co-morbidity symptoms are monitored and maintained or improved  Outcome: Progressing     Problem: Pain  Goal: Turns in bed with improved pain control throughout the shift  Outcome: Progressing     Problem: Pain  Goal: Free from acute confusion related to pain meds throughout the shift  Outcome: Progressing     Problem: Skin  Goal: Prevent/minimize sheer/friction injuries  Outcome: Progressing  Flowsheets (Taken 9/4/2024 3999)  Prevent/minimize sheer/friction injuries:   Complete micro-shifts as needed if patient unable. Adjust patient position to relieve pressure points, not a full turn   Increase activity/out of bed for meals   Use pull sheet   HOB 30 degrees or less   Turn/reposition every 2 hours/use positioning/transfer devices   Utilize specialty bed per algorithm     Problem: Skin  Goal: Promote/optimize nutrition  Outcome: Progressing     Problem: Skin  Goal: Promote skin healing  Outcome: Progressing

## 2024-09-04 NOTE — PROGRESS NOTES
Physical Therapy    Physical Therapy Treatment    Patient Name: Nils Hogan  MRN: [id number]  Today's Date: 9/4/2024  Time Calculation  Start Time: 0930  Stop Time: 1001  Time Calculation (min): 31 min         Assessment/Plan   PT Assessment  End of Session Communication: Bedside nurse  Assessment Comment: Pt has delayed processing d/t previous stroke and takes increased time to compelte activities. Pt eager to participate in therapeutic exercises this session with good engagement throughout.  End of Session Patient Position: Up in chair, Alarm off, not on at start of session  PT Plan  Inpatient/Swing Bed or Outpatient: Inpatient  PT Plan  Treatment/Interventions: Bed mobility, Transfer training, Gait training, Balance training, Strengthening, Endurance training, Therapeutic exercise, Therapeutic activity  PT Plan: Ongoing PT  PT Frequency: 4 times per week  PT Discharge Recommendations: Low intensity level of continued care  Equipment Recommended upon Discharge: Wheeled walker  PT Recommended Transfer Status: Assist x1  PT - OK to Discharge: Yes      General Visit Information:   PT  Visit  PT Received On: 09/04/24  General  Prior to Session Communication: Bedside nurse  Patient Position Received: Up in chair, Alarm off, not on at start of session  General Comment: Pt cleared for PT by RN. Pt agreeable to PT services.    Subjective   Precautions:  Precautions  Precautions Comment: residual left sided weakness + slurred speech from CVA, + tele/spo2     Objective   Pain:  Pain Assessment  Pain Assessment: 0-10  0-10 (Numeric) Pain Score: 0 - No pain  Cognition:  Cognition  Overall Cognitive Status: Within Functional Limits  Processing Speed: Delayed     Postural Control:  Static Sitting Balance  Static Sitting-Balance Support: No upper extremity supported, Feet supported  Static Sitting-Level of Assistance: Close supervision  Static Sitting-Comment/Number of Minutes: good seated static  balance    Treatments:  Therapeutic Exercise  Therapeutic Exercise Performed: Yes  Therapeutic Exercise Activity 1: Seated B marches x15  Therapeutic Exercise Activity 2: Seated B LAQs x15  Therapeutic Exercise Activity 3: Seated B hip abd/add with light resistance x15  Therapeutic Exercise Activity 4: Seated B ankle pumps x15  Therapeutic Exercise Activity 5: Seated B heel slides with washcloth x15    Outcome Measures:  [facility] Basic Mobility  Turning from your back to your side while in a flat bed without using bedrails: A little  Moving from lying on your back to sitting on the side of a flat bed without using bedrails: A little  Moving to and from bed to chair (including a wheelchair): A little  Standing up from a chair using your arms (e.g. wheelchair or bedside chair): A little  To walk in hospital room: A little  Climbing 3-5 steps with railing: A lot  Basic Mobility - Total Score: 17      Encounter Problems       Encounter Problems (Active)       PT Problem       Patient will demonstrate improvements in strength  (Progressing)       Start:  08/30/24    Expected End:  09/20/24            Patient will transfer supine <> sit modified independently  (Not Progressing)       Start:  08/30/24    Expected End:  09/20/24            Patient will transfer sit <> stand modified independently and use of rolling walker  (Not Progressing)       Start:  08/30/24    Expected End:  09/20/24            Patient will ambulate 100 ft modified independently and use of rolling walker  (Progressing)       Start:  08/30/24    Expected End:  09/20/24            Patient will demonstrate good understanding of safety techniques, use of adaptive equipment, body mechanics, precautions.  (Progressing)       Start:  08/30/24    Expected End:  09/20/24               Pain - Adult

## 2024-09-04 NOTE — PROGRESS NOTES
Occupational Therapy    OT Treatment    Patient Name: Nils Hogan  MRN: [id number]  Today's Date: 9/4/2024  Time Calculation  Start Time: 0901  Stop Time: 0925  Time Calculation (min): 24 min        Assessment:  OT Assessment: Tolerated session well, demonstrating improved functional tolerance, however expressing increased weakness this date. Increased education provided on importance of safety during funcitonal tasks with pt expressing understanding. Pt would benefit from continued skilled OT services to improve strength, balance, and functional tolerance to increase independence with ADL tasks  End of Session Communication: Bedside nurse  End of Session Patient Position: Up in chair  OT Assessment Results: Decreased ADL status, Decreased upper extremity range of motion, Decreased upper extremity strength, Decreased endurance, Decreased gross motor control, Decreased functional mobility  Plan:  Treatment Interventions: ADL retraining, Functional transfer training, UE strengthening/ROM, Endurance training, Patient/family training, Equipment evaluation/education, Compensatory technique education  OT Frequency: 4 times per week  OT Discharge Recommendations: Low intensity level of continued care  Equipment Recommended upon Discharge: Wheeled walker  OT Recommended Transfer Status: Assist of 1, Minimal assist  OT - OK to Discharge: Yes  Treatment Interventions: ADL retraining, Functional transfer training, UE strengthening/ROM, Endurance training, Patient/family training, Equipment evaluation/education, Compensatory technique education    Subjective   Previous Visit Info:  OT Last Visit  OT Received On: 09/04/24  General:  General  Prior to Session Communication: Bedside nurse (present at beginning of session)  Patient Position Received: Bed, 3 rail up, Alarm off, caregiver present  General Comment: Cleared for therapy per RN. Pt seated EOB upon arrival with RN and agreeable to tx  Precautions:  Hearing/Visual  Limitations: +glasses  Medical Precautions: Fall precautions  Precautions Comment: residual left sided weakness + slurred speech from CVA      Pain:  Pain Assessment  Pain Assessment: 0-10  0-10 (Numeric) Pain Score: 0 - No pain    Objective    Cognition:  Cognition  Overall Cognitive Status: Within Functional Limits    Activities of Daily Living:    Grooming  Grooming Level of Assistance: Setup, Close supervision  Grooming Where Assessed: Chair  Grooming Comments: s/u for oral hygiene face washing tasks with cues for task efficiency, dentures placed in  and on tray table to soak for increased time    Functional Standing Tolerance:  Time: 10 min  Functional Standing Tolerance Comments: tolerated functional mobility demonstrating fair- balance  Bed Mobility/Transfers: Bed Mobility  Bed Mobility: Yes  Bed Mobility 1  Bed Mobility 1: Scooting  Level of Assistance 1: Minimum assistance, Minimal verbal cues  Bed Mobility Comments 1: assist to scoot hips to EOB with cues of drawsheet    Transfers  Transfer: Yes  Transfer 1  Technique 1: Sit to stand, Stand to sit  Transfer Device 1: Walker  Transfer Level of Assistance 1: Minimum assistance, Minimal verbal cues  Trials/Comments 1: assist for trunk elevation and forward weightshifting with cues for proper hand placement, demonstrating walker abandonment + decreased eccentric lowering to chair. Increased education provided on importance of safety during transfer tasks with pt expressing understanding    Functional Mobility:  Functional Mobility  Functional Mobility Performed: Yes  Functional Mobility 1  Device 1: Rollator  Assistance 1: Contact guard  Comments 1: functional mobility short household distance to/from sink with increased time + effort required for task completion. Cues required for postural alignment and rest breaks d/t increased fatigue, demonstrating fair- balance throughout task    Standing Balance:  Static Standing Balance  Static Standing-Level of  Assistance: Minimum assistance  Static Standing-Comment/Number of Minutes: fair- balance during functional mobility task    Outcome Measures:[facility] Daily Activity  Putting on and taking off regular lower body clothing: A lot  Bathing (including washing, rinsing, drying): A little  Putting on and taking off regular upper body clothing: A little  Toileting, which includes using toilet, bedpan or urinal: A lot  Taking care of personal grooming such as brushing teeth: A little  Eating Meals: A little  Daily Activity - Total Score: 16    Education Documentation  Body Mechanics, taught by LAUREANO Thapa at 9/4/2024 10:11 AM.  Learner: Patient  Readiness: Acceptance  Method: Explanation  Response: Verbalizes Understanding, Needs Reinforcement    ADL Training, taught by LAUREANO Thapa at 9/4/2024 10:11 AM.  Learner: Patient  Readiness: Acceptance  Method: Explanation  Response: Verbalizes Understanding, Needs Reinforcement      Problem: ADLs  Goal: Patient with complete upper body dressing with independent level of assistance donning and doffing all UE clothes while edge of bed   Outcome: Progressing  Goal: Patient with complete lower body dressing with modified independent level of assistance donning and doffing all LE clothes  with PRN adaptive equipment while edge of bed   Outcome: Progressing  Goal: Patient will complete daily grooming tasks with modified independent level of assistance and PRN adaptive equipment while standing.  Outcome: Progressing  Goal: Patient will complete toileting including hygiene clothing management/hygiene with modified independent level of assistance and grab bars.  Outcome: Progressing     Problem: TRANSFERS  Goal: Patient will complete functional transfer to toilet/commode with least restrictive device with modified independent level of assistance.  Outcome: Progressing     Problem: MOBILITY  Goal: Patient will perform Functional mobility max Household  distances/Community Distances with modified independent level of assistance and least restrictive device in order to improve safety and functional mobility.  Outcome: Progressing

## 2024-09-04 NOTE — PROGRESS NOTES
Vancomycin Dosing by Pharmacy- FOLLOW UP    Nils Hogan is a 54 y.o. year old male who Pharmacy has been consulted for vancomycin dosing for pneumonia. Based on the patient's indication and renal status this patient is being dosed based on a goal AUC of 400-600.     Renal function is currently stable.    Current vancomycin dose: 1500 mg given every 12 hours    Estimated vancomycin AUC on current dose: 589 mg/L.hr     Visit Vitals  /62 (BP Location: Left arm, Patient Position: Sitting)   Pulse 99   Temp 36.6 °C (97.9 °F) (Oral)   Resp 17        Lab Results   Component Value Date    CREATININE 0.30 (L) 2024    CREATININE 0.40 2024    CREATININE 0.40 2024    CREATININE 0.30 (L) 2024        Patient weight is as follows:   Vitals:    24 0359   Weight: 71.8 kg (158 lb 4.6 oz)       Cultures:  No results found for the encounter in last 14 days.       I/O last 3 completed shifts:  In: 2260 (31.5 mL/kg) [P.O.:1160; IV Piggyback:1100]  Out: 500 (7 mL/kg) [Urine:500 (0.2 mL/kg/hr)]  Weight: 71.8 kg   I/O during current shift:  I/O this shift:  In: 350 [P.O.:350]  Out: -     Temp (24hrs), Av.9 °C (98.4 °F), Min:36.6 °C (97.9 °F), Max:37.3 °C (99.1 °F)      Assessment/Plan    Within goal AUC range. Continue current vancomycin regimen. However, at upper limit of goal. Will decrease dose to 1250 mg q12    This dosing regimen is predicted by [employer] to result in the following pharmacokinetic parameters:    Regimen: 1250 mg IV every 12 hours.  Start time: 14:16 on 2024  Exposure target: AUC24 (range)400-600 mg/L.hr   AUC24,ss: 481 mg/L.hr  Probability of AUC24 > 400: 76 %  Ctrough,ss: 12.7 mg/L  Probability of Ctrough,ss > 20: 12 %  Probability of nephrotoxicity (Lodise LUDA ): 8 %      The next level will be obtained on  at 0500. May be obtained sooner if clinically indicated.   Will continue to monitor renal function daily while on vancomycin and order serum creatinine at  least every 48 hours if not already ordered.  Follow for continued vancomycin needs, clinical response, and signs/symptoms of toxicity.       Archie Lucero, [facility]

## 2024-09-04 NOTE — PROGRESS NOTES
Await pulmonology to clear the pt. [facility] sent home health order to [facility] and made them aware pt might dc today, will update them further when able.      09/04/24 1204   Discharge Planning   Expected Discharge Disposition HH Services  ([facility])

## 2024-09-05 LAB
ATRIAL RATE: 98 BPM
P AXIS: 56 DEGREES
P OFFSET: 208 MS
P ONSET: 156 MS
PR INTERVAL: 128 MS
Q ONSET: 220 MS
QRS COUNT: 16 BEATS
QRS DURATION: 90 MS
QT INTERVAL: 402 MS
QTC CALCULATION(BAZETT): 513 MS
QTC FREDERICIA: 473 MS
R AXIS: 3 DEGREES
T AXIS: 53 DEGREES
T OFFSET: 421 MS
VENTRICULAR RATE: 98 BPM

## 2024-09-05 NOTE — DISCHARGE SUMMARY
Discharge Diagnosis  Generalized abdominal pain    Issues Requiring Follow-Up  Cirhhosis with fluid overload and malnutrition    Discharge Meds     Medication List      START taking these medications     cefuroxime 500 mg tablet; Commonly known as: Ceftin; Take 1 tablet (500   mg) by mouth 2 times a day for 7 days.   doxycycline 100 mg in dextrose 5% 100 mL IV; Infuse 100 mg at 100 mL/hr   over 60 minutes into a venous catheter every 12 hours.   doxycycline 100 mg tablet; Commonly known as: Vibra-Tabs; Take 1 tablet   (100 mg) by mouth every 12 hours for 7 days. Take with a full glass of   water and do not lie down for at least 30 minutes after.   furosemide 40 mg tablet; Commonly known as: Lasix; Take 1 tablet (40 mg)   by mouth 2 times daily (morning and late afternoon).   mirtazapine 30 mg tablet; Commonly known as: Remeron; Take 1 tablet (30   mg) by mouth once daily at bedtime.   spironolactone 50 mg tablet; Commonly known as: Aldactone; Take 1 tablet   (50 mg) by mouth once daily.     CONTINUE taking these medications     ascorbic acid 1,000 mg tablet; Commonly known as: Vitamin C   aspirin 81 mg EC tablet   D3-2000 50 mcg (2,000 unit) capsule; Generic drug: cholecalciferol   metoprolol tartrate 25 mg tablet; Commonly known as: Lopressor   pantoprazole 40 mg EC tablet; Commonly known as: ProtoNix   QUEtiapine 100 mg tablet; Commonly known as: SEROquel; Take 0.5 tablets   (50 mg) by mouth once daily at bedtime.     STOP taking these medications     ALPRAZolam 1 mg tablet; Commonly known as: Xanax   cephalexin 500 mg capsule; Commonly known as: Keflex   zinc gluconate 50 mg tablet       Test Results Pending At Discharge  Pending Labs       Order Current Status    Cytology (Non-Gynecologic) In process    Mycoplasma pneumoniae antibody, IgM In process    AFB Culture/Smear Preliminary result    Fungal Culture/Smear Preliminary result            Hospital Course   Better after paracenthesis and  thoracocentesis    Pertinent Physical Exam At Time of Discharge  Physical Exam/better    Outpatient Follow-Up  No future appointments.      Barbara Brunson MD

## 2024-09-06 LAB — M PNEUMO IGM SER IA-ACNC: 0.19 U/L

## 2024-09-09 ENCOUNTER — HOSPITAL ENCOUNTER (EMERGENCY)
Facility: HOSPITAL | Age: 54
Discharge: HOME | DRG: 177 | End: 2024-09-09
Attending: EMERGENCY MEDICINE
Payer: COMMERCIAL

## 2024-09-09 ENCOUNTER — APPOINTMENT (OUTPATIENT)
Dept: RADIOLOGY | Facility: HOSPITAL | Age: 54
DRG: 177 | End: 2024-09-09
Payer: COMMERCIAL

## 2024-09-09 VITALS
RESPIRATION RATE: 18 BRPM | WEIGHT: 158 LBS | SYSTOLIC BLOOD PRESSURE: 124 MMHG | HEIGHT: 64 IN | DIASTOLIC BLOOD PRESSURE: 71 MMHG | OXYGEN SATURATION: 95 % | BODY MASS INDEX: 26.98 KG/M2 | TEMPERATURE: 99.3 F | HEART RATE: 99 BPM

## 2024-09-09 DIAGNOSIS — S43.402A SPRAIN OF LEFT SHOULDER, UNSPECIFIED SHOULDER SPRAIN TYPE, INITIAL ENCOUNTER: ICD-10-CM

## 2024-09-09 DIAGNOSIS — M25.552 LEFT HIP PAIN: ICD-10-CM
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
## 2024-09-09 LAB
FUNGUS SPEC CULT: NORMAL
FUNGUS SPEC FUNGUS STN: NORMAL

## 2024-09-09 PROCEDURE — 99284 EMERGENCY DEPT VISIT MOD MDM: CPT | Mod: 25 | Performed by: EMERGENCY MEDICINE

## 2024-09-09 PROCEDURE — 72170 X-RAY EXAM OF PELVIS: CPT

## 2024-09-09 PROCEDURE — 73060 X-RAY EXAM OF HUMERUS: CPT | Mod: LEFT SIDE | Performed by: RADIOLOGY

## 2024-09-09 PROCEDURE — 71046 X-RAY EXAM CHEST 2 VIEWS: CPT | Performed by: RADIOLOGY

## 2024-09-09 PROCEDURE — 73552 X-RAY EXAM OF FEMUR 2/>: CPT | Mod: LEFT SIDE | Performed by: RADIOLOGY

## 2024-09-09 PROCEDURE — 71046 X-RAY EXAM CHEST 2 VIEWS: CPT

## 2024-09-09 PROCEDURE — 2500000001 HC RX 250 WO HCPCS SELF ADMINISTERED DRUGS (ALT 637 FOR MEDICARE OP): Performed by: EMERGENCY MEDICINE

## 2024-09-09 PROCEDURE — 73060 X-RAY EXAM OF HUMERUS: CPT | Mod: LT

## 2024-09-09 PROCEDURE — 72170 X-RAY EXAM OF PELVIS: CPT | Mod: LEFT SIDE | Performed by: RADIOLOGY

## 2024-09-09 PROCEDURE — 73552 X-RAY EXAM OF FEMUR 2/>: CPT | Mod: LT

## 2024-09-09 RX ORDER — TRAMADOL HYDROCHLORIDE 50 MG/1
50 TABLET ORAL ONCE
Status: COMPLETED | OUTPATIENT
Start: 2024-09-09 | End: 2024-09-09

## 2024-09-09 ASSESSMENT — PAIN SCALES - GENERAL
PAINLEVEL_OUTOF10: 8
PAINLEVEL_OUTOF10: 8

## 2024-09-09 ASSESSMENT — PAIN DESCRIPTION - LOCATION: LOCATION: HIP

## 2024-09-09 ASSESSMENT — LIFESTYLE VARIABLES
EVER HAD A DRINK FIRST THING IN THE MORNING TO STEADY YOUR NERVES TO GET RID OF A HANGOVER: NO
EVER FELT BAD OR GUILTY ABOUT YOUR DRINKING: NO
TOTAL SCORE: 0
HAVE YOU EVER FELT YOU SHOULD CUT DOWN ON YOUR DRINKING: NO
HAVE PEOPLE ANNOYED YOU BY CRITICIZING YOUR DRINKING: NO

## 2024-09-09 ASSESSMENT — PAIN DESCRIPTION - ONSET: ONSET: AWAKENED FROM SLEEP

## 2024-09-09 ASSESSMENT — COLUMBIA-SUICIDE SEVERITY RATING SCALE - C-SSRS
2. HAVE YOU ACTUALLY HAD ANY THOUGHTS OF KILLING YOURSELF?: NO
6. HAVE YOU EVER DONE ANYTHING, STARTED TO DO ANYTHING, OR PREPARED TO DO ANYTHING TO END YOUR LIFE?: NO
1. IN THE PAST MONTH, HAVE YOU WISHED YOU WERE DEAD OR WISHED YOU COULD GO TO SLEEP AND NOT WAKE UP?: NO

## 2024-09-09 ASSESSMENT — PAIN DESCRIPTION - PAIN TYPE: TYPE: ACUTE PAIN

## 2024-09-09 ASSESSMENT — PAIN DESCRIPTION - DESCRIPTORS: DESCRIPTORS: STABBING

## 2024-09-09 ASSESSMENT — PAIN - FUNCTIONAL ASSESSMENT: PAIN_FUNCTIONAL_ASSESSMENT: 0-10

## 2024-09-09 ASSESSMENT — PAIN DESCRIPTION - ORIENTATION: ORIENTATION: LEFT

## 2024-09-09 ASSESSMENT — PAIN DESCRIPTION - PROGRESSION: CLINICAL_PROGRESSION: NOT CHANGED

## 2024-09-09 ASSESSMENT — PAIN DESCRIPTION - FREQUENCY: FREQUENCY: CONSTANT/CONTINUOUS

## 2024-09-09 NOTE — PROGRESS NOTES
Attestation/Supervisory note for TRUDI Snow      The patient is a 54-year-old male presenting to the emergency department by EMS from home after he slipped and fell out of a chair.  The patient states that he was sitting in a recliner urinary and he started sliding out of the chair and could not stop himself.  He states he normally ambulates with the use of a rollator.  He states that he does have chronic hip pain.  He states he did not hit his head or lose consciousness.  He slipped out of the chair onto his buttocks.  He states that he does have some pain in the left hip.  He states it is worse when he moves his left hip.  He states he also injured his left arm when he slid out of the chair.  He states it is worse when he moves the left shoulder.  No neck or back pain.  No focal weakness or numbness.  No fever or chills.  No chest pain or shortness of breath.  No abdominal pain.  No nausea vomiting.  No diarrhea or constipation.  No urinary complaints.  All pertinent positives and negatives are recorded above.  All other systems reviewed and otherwise negative.  Vital signs within normal limits.  Physical exam with a well-nourished well-developed male with disheveled appearance but no evidence of acute distress.  HEENT exam with dry mucous membranes but otherwise within normal limits.  He has no evidence of airway compromise or respiratory distress.  Abdominal exam is benign.  He does not have any visible or palpable bony deformity but he does have pain with palpation and range of motion of the left shoulder and the left hip.  Pulses are equal bilaterally.      Oral Ultram ordered for pain      XR chest 2 views   Final Result   Worsening left pleural effusion.        Signed by: Tamir Luevano 9/9/2024 4:32 PM   Dictation workstation:   [id number]      XR pelvis 1-2 views   Final Result   No acute fracture or subluxation.        Signed by: Tamir Luevano 9/9/2024 4:35 PM   Dictation workstation:   [id number]       XR femur left 2+ views   Final Result   No acute fracture or subluxation.        Signed by: Tamir Luevano 9/9/2024 4:35 PM   Dictation workstation:   [id number]      XR humerus left   Final Result   Normal radiographs of the left humerus.        Signed by: Tamir Luevano 9/9/2024 4:36 PM   Dictation workstation:   [id number]           The patient does not have any visible or palpable bony deformity on exam.  He does not have any evidence of fracture or dislocation on diagnostic imaging.  He does not have any gross motor, neurologic or vascular deficits on exam.      The patient was released in good condition.  He will follow-up with his primary care physician within 1 to 2 days for further management of his current symptoms.  He will return to the emergency department sooner with worsening of symptoms or onset of new symptoms.      Impression/diagnosis:  1.  Fall from chair, initial encounter  2.  Left shoulder sprain  3.  Left hip pain, acute on chronic      I personally saw the patient and made/approve the management plan and take responsibility for the patient management.      I personally discussed the patient's management with the patient      I reviewed the results of the diagnostic imaging.  Formal radiology read was completed by the radiologist.      Anayeli Delarosa MD   yes/treatment indicated

## 2024-09-09 NOTE — DISCHARGE INSTRUCTIONS
Follow-up with your primary care physician within 1 to 2 days for further management of your current symptoms.    Follow-up with orthopedics for further management of your injury    Return to the emergency department sooner with worsening of symptoms or onset of new symptoms

## 2024-09-09 NOTE — ED PROVIDER NOTES
HPI   Chief Complaint   Patient presents with    Fall     Pt slipped out of chair today denies LOC and blood thinners. Pt is complaining of left shoulder and hip pain        54-year-old male history of bipolar disorder, prior CVA with left-sided deficits, epilepsy, hyperlipidemia, hypertension seen in the ED today for evaluation of a fall mechanically that occurred prior to arrival.  Reportedly he was going to sit down on his lift chair and he slid forward out of it falling onto his left hip and shoulder injuring them.  Did not hit his head or lose consciousness.  EMS was called and he was brought to the ED for further evaluation.  Denies numbness or tingling or weakness of extremities, anticoagulant use, head injury, focal neurologic deficits or weaknesses.              Patient History   Past Medical History:   Diagnosis Date    Bipolar I disorder (Multi)     CVA (cerebral vascular accident) (Multi)     Duodenal cancer (Multi) 2019    Epilepsy (Multi)     Hyperlipidemia     Hypertension     Tobacco use     Urethral stricture      Past Surgical History:   Procedure Laterality Date    ADENOIDECTOMY      URETHRA SURGERY      Urethral stricture repair    WHIPPLE PROCEDURE  2019    Diagnostic laparoscopy, open laparotomy for pylorus-preserving pancreaticoduodenectomy, pancreaticojejunostomy, hepaticojejunostomy, gastrojejunostomy and liver biopsy     Family History   Problem Relation Name Age of Onset    Stroke Mother      Bipolar disorder Mother      Prostate cancer Father      Hypertension Sister      Hyperlipidemia Sister      Asthma Son      Colon cancer Paternal Grandfather       Social History     Tobacco Use    Smoking status: Former     Current packs/day: 0.00     Average packs/day: 0.3 packs/day for 37.6 years (9.4 ttl pk-yrs)     Types: Cigarettes     Start date:      Quit date: 2024     Years since quittin.1    Smokeless tobacco: Not on file   Substance Use Topics    Alcohol use: Yes      Comment: He reports that he drinks alcohol occasionally    Drug use: Not on file       Physical Exam   ED Triage Vitals [09/09/24 1532]   Temperature Heart Rate Respirations BP   37.4 °C (99.3 °F) 99 18 124/71      Pulse Ox Temp Source Heart Rate Source Patient Position   95 % Oral Monitor Sitting      BP Location FiO2 (%)     Left arm --       Physical Exam  Constitutional:       Comments: GENERAL APPEARANCE:  Well nourished.  Well developed.  No acute distress.  HEENT: Normocephalic. atraumatic.   NECK: Trachea midline.   CARDIOVASCULAR: Heart is regular rate and rhythm and without murmur.  RESPIRATORY:  Lungs are clear to auscultation bilaterally.  No increased respiratory effort.  No acute respiratory distress.  GASTROINESTIONAL: Soft, non-distended with normal bowel sounds.  No tenderness to palpation.  No rebound, guarding or peritonitis.  No palpable or pulsatile masses.  MUSCULOSKELETAL: Moves extremities. There is tenderness to palpation of the left generalized lateral and posterior shoulder region.  Range of motion intact although with pain with shoulder extension and abduction but able to do so.  Radial pulse 2+ intact to the bilateral upper extremities.  Left hip with generalized tenderness. Active range of motion intact.  DP pulse intact and 2+ to the left lower extremity.  NEUROLOGIC:  Alert and oriented.  No focal neurologic deficits.  No dysarthria, aphasia, disconjugate gaze or gaze preference, facial asymmetry, sensory discrepancies of the upper or lower extremity.  Left-sided weakness of the arm and leg which patient states is baseline since his stroke.  No right-sided weakness.  Alterman status or ataxic movements.  BEHAVIORAL:  Age appropriate and cooperative.  SKIN:  Clean and dry.           ED Course & MDM   Diagnoses as of 09/09/24 1729   Fall, initial encounter   Sprain of left shoulder, unspecified shoulder sprain type, initial encounter   Left hip pain                 No data recorded      Alejandra Coma Scale Score: 15 (09/09/24 1535 : Hyacinth Ortiz, RN)                           Medical Decision Making  54-year-old male seen in the ED for evaluation of left upper and lower extremity injury after mechanical fall slipping out of the lift chair at home.  Due to consideration for emergent process including fracture, dislocation amongst others a work-up is pursued.  Medications  traMADol (Ultram) tablet 50 mg (50 mg oral Given 9/9/24 1538)    Labs Reviewed - No data to display    XR chest 2 views   Final Result    Worsening left pleural effusion.          Signed by: Tamir Luevano 9/9/2024 4:32 PM    Dictation workstation:   [id number]     XR pelvis 1-2 views   Final Result    No acute fracture or subluxation.          Signed by: Tamir Luevano 9/9/2024 4:35 PM    Dictation workstation:   [id number]     XR femur left 2+ views   Final Result    No acute fracture or subluxation.          Signed by: Tamir Luevano 9/9/2024 4:35 PM    Dictation workstation:   [id number]     XR humerus left   Final Result    Normal radiographs of the left humerus.          Signed by: Tamir Luevano 9/9/2024 4:36 PM    Dictation workstation:   [id number]       Imaging negative for acute process.  Patient be discharged in good condition instructed on supportive care, close follow-up and return precautions.    This document was completed using voice recognition transcription software.  Please excuse any errors of transcription including grammatical, punctuation and spelling errors.  Please contact me with any questions regarding this chart.        Procedure  Procedures     Jose Rafael Snow PA-C  09/09/24 1893

## 2024-09-10 LAB
LAB AP ASR DISCLAIMER: NORMAL
LABORATORY COMMENT REPORT: NORMAL
LABORATORY COMMENT REPORT: NORMAL
PATH REPORT.FINAL DX SPEC: NORMAL
PATH REPORT.GROSS SPEC: NORMAL
PATH REPORT.RELEVANT HX SPEC: NORMAL
PATH REPORT.TOTAL CANCER: NORMAL

## 2024-09-11 ENCOUNTER — APPOINTMENT (OUTPATIENT)
Dept: RADIOLOGY | Facility: HOSPITAL | Age: 54
DRG: 177 | End: 2024-09-11
Payer: COMMERCIAL

## 2024-09-11 ENCOUNTER — APPOINTMENT (OUTPATIENT)
Dept: CARDIOLOGY | Facility: HOSPITAL | Age: 54
DRG: 177 | End: 2024-09-11
Payer: COMMERCIAL

## 2024-09-11 ENCOUNTER — HOSPITAL ENCOUNTER (INPATIENT)
Facility: HOSPITAL | Age: 54
Discharge: HOME | DRG: 177 | End: 2024-09-11
Attending: EMERGENCY MEDICINE | Admitting: INTERNAL MEDICINE
Payer: COMMERCIAL

## 2024-09-11 DIAGNOSIS — R62.7 ADULT FAILURE TO THRIVE SYNDROME: ICD-10-CM

## 2024-09-11 DIAGNOSIS — U07.1 COVID-19: Primary | ICD-10-CM

## 2024-09-11 DIAGNOSIS — R53.81 MALAISE AND FATIGUE: ICD-10-CM

## 2024-09-11 DIAGNOSIS — J90 PLEURAL EFFUSION: ICD-10-CM

## 2024-09-11 DIAGNOSIS — R53.83 MALAISE AND FATIGUE: ICD-10-CM

## 2024-09-11 DIAGNOSIS — E80.6 HYPERBILIRUBINEMIA: ICD-10-CM

## 2024-09-11 DIAGNOSIS — R74.01 TRANSAMINITIS: ICD-10-CM

## 2024-09-11 PROBLEM — D64.9 ANEMIA: Status: ACTIVE | Noted: 2024-09-11

## 2024-09-11 PROBLEM — N39.0 URINARY TRACT INFECTION: Status: ACTIVE | Noted: 2024-09-11

## 2024-09-11 PROBLEM — K70.31 ALCOHOLIC CIRRHOSIS OF LIVER WITH ASCITES (MULTI): Status: ACTIVE | Noted: 2024-09-11

## 2024-09-11 PROBLEM — I10 HYPERTENSION: Status: ACTIVE | Noted: 2024-09-11

## 2024-09-11 LAB
ACID FAST STN SPEC: NORMAL
ALBUMIN SERPL-MCNC: 2.4 G/DL (ref 3.5–5)
ALP BLD-CCNC: 183 U/L (ref 35–125)
ALT SERPL-CCNC: 47 U/L (ref 5–40)
AMPHETAMINES UR QL SCN>1000 NG/ML: NEGATIVE
ANION GAP SERPL CALC-SCNC: 10 MMOL/L
APPEARANCE UR: ABNORMAL
AST SERPL-CCNC: 321 U/L (ref 5–40)
BACTERIA #/AREA URNS AUTO: ABNORMAL /HPF
BARBITURATES UR QL SCN>300 NG/ML: NEGATIVE
BASOPHILS # BLD MANUAL: 0.05 X10*3/UL (ref 0–0.1)
BASOPHILS NFR BLD MANUAL: 1 %
BENZODIAZ UR QL SCN>300 NG/ML: NEGATIVE
BILIRUB SERPL-MCNC: 7.4 MG/DL (ref 0.1–1.2)
BILIRUB UR STRIP.AUTO-MCNC: ABNORMAL MG/DL
BUN SERPL-MCNC: 5 MG/DL (ref 8–25)
BURR CELLS BLD QL SMEAR: ABNORMAL
BZE UR QL SCN>300 NG/ML: NEGATIVE
CALCIUM SERPL-MCNC: 8.2 MG/DL (ref 8.5–10.4)
CANNABINOIDS UR QL SCN>50 NG/ML: NEGATIVE
CHLORIDE SERPL-SCNC: 90 MMOL/L (ref 97–107)
CO2 SERPL-SCNC: 29 MMOL/L (ref 24–31)
COLOR UR: ABNORMAL
CREAT SERPL-MCNC: 0.5 MG/DL (ref 0.4–1.6)
DACRYOCYTES BLD QL SMEAR: ABNORMAL
EGFRCR SERPLBLD CKD-EPI 2021: >90 ML/MIN/1.73M*2
EOSINOPHIL # BLD MANUAL: 0 X10*3/UL (ref 0–0.7)
EOSINOPHIL NFR BLD MANUAL: 0 %
ERYTHROCYTE [DISTWIDTH] IN BLOOD BY AUTOMATED COUNT: 15.8 % (ref 11.5–14.5)
ETHANOL SERPL-MCNC: <0.01 G/DL
FENTANYL+NORFENTANYL UR QL SCN: NEGATIVE
GLUCOSE SERPL-MCNC: 117 MG/DL (ref 65–99)
GLUCOSE UR STRIP.AUTO-MCNC: NORMAL MG/DL
HCT VFR BLD AUTO: 37.3 % (ref 41–52)
HGB BLD-MCNC: 13.2 G/DL (ref 13.5–17.5)
HYALINE CASTS #/AREA URNS AUTO: ABNORMAL /LPF
IMM GRANULOCYTES # BLD AUTO: 0.02 X10*3/UL (ref 0–0.7)
IMM GRANULOCYTES NFR BLD AUTO: 0.4 % (ref 0–0.9)
KETONES UR STRIP.AUTO-MCNC: NEGATIVE MG/DL
LEUKOCYTE ESTERASE UR QL STRIP.AUTO: NEGATIVE
LIPASE SERPL-CCNC: <16 U/L (ref 16–63)
LYMPHOCYTES # BLD MANUAL: 0.74 X10*3/UL (ref 1.2–4.8)
LYMPHOCYTES NFR BLD MANUAL: 15 %
MCH RBC QN AUTO: 36 PG (ref 26–34)
MCHC RBC AUTO-ENTMCNC: 35.4 G/DL (ref 32–36)
MCV RBC AUTO: 102 FL (ref 80–100)
METHADONE UR QL SCN>300 NG/ML: NEGATIVE
MONOCYTES # BLD MANUAL: 0.15 X10*3/UL (ref 0.1–1)
MONOCYTES NFR BLD MANUAL: 3 %
MUCOUS THREADS #/AREA URNS AUTO: ABNORMAL /LPF
MYCOBACTERIUM SPEC CULT: NORMAL
NEUTROPHILS # BLD MANUAL: 3.97 X10*3/UL (ref 1.2–7.7)
NEUTS BAND # BLD MANUAL: 0.05 X10*3/UL (ref 0–0.7)
NEUTS BAND NFR BLD MANUAL: 1 %
NEUTS SEG # BLD MANUAL: 3.92 X10*3/UL (ref 1.2–7)
NEUTS SEG NFR BLD MANUAL: 80 %
NITRITE UR QL STRIP.AUTO: NEGATIVE
NRBC BLD-RTO: 0 /100 WBCS (ref 0–0)
OPIATES UR QL SCN>300 NG/ML: NEGATIVE
OXYCODONE UR QL: NEGATIVE
PCP UR QL SCN>25 NG/ML: NEGATIVE
PH UR STRIP.AUTO: 6 [PH]
PLATELET # BLD AUTO: 105 X10*3/UL (ref 150–450)
POTASSIUM SERPL-SCNC: 3.2 MMOL/L (ref 3.4–5.1)
PROT SERPL-MCNC: 8.2 G/DL (ref 5.9–7.9)
PROT UR STRIP.AUTO-MCNC: ABNORMAL MG/DL
RBC # BLD AUTO: 3.67 X10*6/UL (ref 4.5–5.9)
RBC # UR STRIP.AUTO: ABNORMAL /UL
RBC #/AREA URNS AUTO: >20 /HPF
RBC MORPH BLD: ABNORMAL
SARS-COV-2 RNA RESP QL NAA+PROBE: DETECTED
SODIUM SERPL-SCNC: 129 MMOL/L (ref 133–145)
SP GR UR STRIP.AUTO: 1.02
SQUAMOUS #/AREA URNS AUTO: ABNORMAL /HPF
TARGETS BLD QL SMEAR: ABNORMAL
TOTAL CELLS COUNTED BLD: 100
TROPONIN T SERPL-MCNC: 10 NG/L
TROPONIN T SERPL-MCNC: 12 NG/L
TROPONIN T SERPL-MCNC: 12 NG/L
UROBILINOGEN UR STRIP.AUTO-MCNC: ABNORMAL MG/DL
WBC # BLD AUTO: 4.9 X10*3/UL (ref 4.4–11.3)
WBC #/AREA URNS AUTO: ABNORMAL /HPF
YEAST BUDDING #/AREA UR COMP ASSIST: PRESENT /HPF

## 2024-09-11 PROCEDURE — 93010 ELECTROCARDIOGRAM REPORT: CPT | Performed by: INTERNAL MEDICINE

## 2024-09-11 PROCEDURE — 2500000002 HC RX 250 W HCPCS SELF ADMINISTERED DRUGS (ALT 637 FOR MEDICARE OP, ALT 636 FOR OP/ED): Performed by: NURSE PRACTITIONER

## 2024-09-11 PROCEDURE — 84484 ASSAY OF TROPONIN QUANT: CPT | Performed by: EMERGENCY MEDICINE

## 2024-09-11 PROCEDURE — 74176 CT ABD & PELVIS W/O CONTRAST: CPT

## 2024-09-11 PROCEDURE — 99223 1ST HOSP IP/OBS HIGH 75: CPT | Performed by: NURSE PRACTITIONER

## 2024-09-11 PROCEDURE — 36415 COLL VENOUS BLD VENIPUNCTURE: CPT | Performed by: EMERGENCY MEDICINE

## 2024-09-11 PROCEDURE — 76705 ECHO EXAM OF ABDOMEN: CPT

## 2024-09-11 PROCEDURE — 76705 ECHO EXAM OF ABDOMEN: CPT | Performed by: RADIOLOGY

## 2024-09-11 PROCEDURE — 87086 URINE CULTURE/COLONY COUNT: CPT | Mod: WESLAB | Performed by: EMERGENCY MEDICINE

## 2024-09-11 PROCEDURE — 93005 ELECTROCARDIOGRAM TRACING: CPT

## 2024-09-11 PROCEDURE — 85007 BL SMEAR W/DIFF WBC COUNT: CPT | Performed by: EMERGENCY MEDICINE

## 2024-09-11 PROCEDURE — 71045 X-RAY EXAM CHEST 1 VIEW: CPT

## 2024-09-11 PROCEDURE — 80053 COMPREHEN METABOLIC PANEL: CPT | Performed by: EMERGENCY MEDICINE

## 2024-09-11 PROCEDURE — 1210000001 HC SEMI-PRIVATE ROOM DAILY

## 2024-09-11 PROCEDURE — 96360 HYDRATION IV INFUSION INIT: CPT

## 2024-09-11 PROCEDURE — 85027 COMPLETE CBC AUTOMATED: CPT | Performed by: EMERGENCY MEDICINE

## 2024-09-11 PROCEDURE — 2500000004 HC RX 250 GENERAL PHARMACY W/ HCPCS (ALT 636 FOR OP/ED): Performed by: EMERGENCY MEDICINE

## 2024-09-11 PROCEDURE — 71045 X-RAY EXAM CHEST 1 VIEW: CPT | Performed by: RADIOLOGY

## 2024-09-11 PROCEDURE — 2500000001 HC RX 250 WO HCPCS SELF ADMINISTERED DRUGS (ALT 637 FOR MEDICARE OP): Performed by: NURSE PRACTITIONER

## 2024-09-11 PROCEDURE — 74176 CT ABD & PELVIS W/O CONTRAST: CPT | Performed by: STUDENT IN AN ORGANIZED HEALTH CARE EDUCATION/TRAINING PROGRAM

## 2024-09-11 PROCEDURE — 99285 EMERGENCY DEPT VISIT HI MDM: CPT

## 2024-09-11 PROCEDURE — 87635 SARS-COV-2 COVID-19 AMP PRB: CPT | Performed by: EMERGENCY MEDICINE

## 2024-09-11 PROCEDURE — 83690 ASSAY OF LIPASE: CPT | Performed by: EMERGENCY MEDICINE

## 2024-09-11 PROCEDURE — 1200000002 HC GENERAL ROOM WITH TELEMETRY DAILY

## 2024-09-11 PROCEDURE — 82077 ASSAY SPEC XCP UR&BREATH IA: CPT | Performed by: EMERGENCY MEDICINE

## 2024-09-11 PROCEDURE — 80307 DRUG TEST PRSMV CHEM ANLYZR: CPT | Performed by: EMERGENCY MEDICINE

## 2024-09-11 PROCEDURE — 81001 URINALYSIS AUTO W/SCOPE: CPT | Performed by: EMERGENCY MEDICINE

## 2024-09-11 RX ORDER — ONDANSETRON 4 MG/1
4 TABLET, FILM COATED ORAL EVERY 8 HOURS PRN
Status: DISCONTINUED | OUTPATIENT
Start: 2024-09-11 | End: 2024-09-17 | Stop reason: HOSPADM

## 2024-09-11 RX ORDER — CEFTRIAXONE 1 G/50ML
1 INJECTION, SOLUTION INTRAVENOUS
Status: DISCONTINUED | OUTPATIENT
Start: 2024-09-12 | End: 2024-09-17

## 2024-09-11 RX ORDER — POLYETHYLENE GLYCOL 3350 17 G/17G
17 POWDER, FOR SOLUTION ORAL DAILY PRN
Status: DISCONTINUED | OUTPATIENT
Start: 2024-09-11 | End: 2024-09-17 | Stop reason: HOSPADM

## 2024-09-11 RX ORDER — FUROSEMIDE 40 MG/1
40 TABLET ORAL
Status: DISCONTINUED | OUTPATIENT
Start: 2024-09-12 | End: 2024-09-17

## 2024-09-11 RX ORDER — ONDANSETRON HYDROCHLORIDE 2 MG/ML
4 INJECTION, SOLUTION INTRAVENOUS EVERY 8 HOURS PRN
Status: DISCONTINUED | OUTPATIENT
Start: 2024-09-11 | End: 2024-09-17 | Stop reason: HOSPADM

## 2024-09-11 RX ORDER — MIRTAZAPINE 15 MG/1
30 TABLET, FILM COATED ORAL NIGHTLY
Status: DISCONTINUED | OUTPATIENT
Start: 2024-09-11 | End: 2024-09-17 | Stop reason: HOSPADM

## 2024-09-11 RX ORDER — METOPROLOL TARTRATE 25 MG/1
25 TABLET, FILM COATED ORAL 2 TIMES DAILY
Status: DISCONTINUED | OUTPATIENT
Start: 2024-09-11 | End: 2024-09-17 | Stop reason: HOSPADM

## 2024-09-11 RX ORDER — QUETIAPINE FUMARATE 50 MG/1
50 TABLET, FILM COATED ORAL NIGHTLY
Status: DISCONTINUED | OUTPATIENT
Start: 2024-09-11 | End: 2024-09-17 | Stop reason: HOSPADM

## 2024-09-11 RX ORDER — POTASSIUM CHLORIDE 20 MEQ/1
20 TABLET, EXTENDED RELEASE ORAL ONCE
Status: COMPLETED | OUTPATIENT
Start: 2024-09-11 | End: 2024-09-11

## 2024-09-11 RX ORDER — MORPHINE SULFATE 2 MG/ML
2 INJECTION, SOLUTION INTRAMUSCULAR; INTRAVENOUS EVERY 4 HOURS PRN
Status: DISCONTINUED | OUTPATIENT
Start: 2024-09-11 | End: 2024-09-17 | Stop reason: HOSPADM

## 2024-09-11 RX ORDER — SPIRONOLACTONE 50 MG/1
50 TABLET, FILM COATED ORAL DAILY
Status: DISCONTINUED | OUTPATIENT
Start: 2024-09-12 | End: 2024-09-17 | Stop reason: HOSPADM

## 2024-09-11 RX ORDER — PANTOPRAZOLE SODIUM 40 MG/1
40 TABLET, DELAYED RELEASE ORAL
Status: DISCONTINUED | OUTPATIENT
Start: 2024-09-12 | End: 2024-09-17 | Stop reason: HOSPADM

## 2024-09-11 ASSESSMENT — ENCOUNTER SYMPTOMS
HEMATOLOGIC/LYMPHATIC NEGATIVE: 1
LIGHT-HEADEDNESS: 1
FATIGUE: 1
ABDOMINAL PAIN: 1
ENDOCRINE NEGATIVE: 1
ABDOMINAL DISTENTION: 1
EYES NEGATIVE: 1
SHORTNESS OF BREATH: 1
HEMATURIA: 1
ALLERGIC/IMMUNOLOGIC NEGATIVE: 1
CARDIOVASCULAR NEGATIVE: 1
MUSCULOSKELETAL NEGATIVE: 1
PSYCHIATRIC NEGATIVE: 1

## 2024-09-11 ASSESSMENT — PAIN - FUNCTIONAL ASSESSMENT: PAIN_FUNCTIONAL_ASSESSMENT: 0-10

## 2024-09-11 NOTE — ED TRIAGE NOTES
Pt states he has been feeling weak overt he last few days. Pt states the last two days he noticed his urine was very dark. Pt states he has been keeping himself hydrated with water and Gatorade.

## 2024-09-11 NOTE — ED NOTES
Pt states he has been feeling weak overt he last few days. Pt states the last two days he noticed his urine was very dark. Pt states he has been keeping himself hydrated with water and Gatorade.         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    Epilepsy (Multi)     Hyperlipidemia     Hypertension     Tobacco use     Urethral stricture         Allergies   Allergen Reactions    Diazepam Anaphylaxis and Cardiac arrhythmia/arrest    Iodinated Contrast Media Anaphylaxis    Iodine Anaphylaxis    Zosyn [Piperacillin-Tazobactam] Anaphylaxis    Acetaminophen Nausea/vomiting    Phenytoin Sodium Extended Hives and Rash         LABS:   Latest Reference Range & Units 09/11/24 15:54   GLUCOSE 65 - 99 mg/dL 117 (H)   SODIUM 133 - 145 mmol/L 129 (L)   POTASSIUM 3.4 - 5.1 mmol/L 3.2 (L)   CHLORIDE 97 - 107 mmol/L 90 (L)   Bicarbonate 24 - 31 mmol/L 29   Anion Gap <=19 mmol/L 10   Blood Urea Nitrogen 8 - 25 mg/dL 5 (L)   Creatinine 0.40 - 1.60 mg/dL 0.50   EGFR >60 mL/min/1.73m*2 >90   Calcium 8.5 - 10.4 mg/dL 8.2 (L)   Albumin 3.5 - 5.0 g/dL 2.4 (L)   Alkaline Phosphatase 35 - 125 U/L 183 (H)   ALT 5 - 40 U/L 47 (H)   AST 5 - 40 U/L 321 (H)   Bilirubin Total 0.1 - 1.2 mg/dL 7.4 (H)   Total Protein 5.9 - 7.9 g/dL 8.2 (H)   LIPASE 16 - 63 U/L <16 (L)   (H): Data is abnormally high  (L): Data is abnormally low   Latest Reference Range & Units 09/11/24 15:54   WBC 4.4 - 11.3 x10*3/uL 4.9   nRBC 0.0 - 0.0 /100 WBCs 0.0   RBC 4.50 - 5.90 x10*6/uL 3.67 (L)   HEMOGLOBIN 13.5 - 17.5 g/dL 13.2 (L)   HEMATOCRIT 41.0 - 52.0 % 37.3 (L)   MCV 80 - 100 fL 102 (H)   MCH 26.0 - 34.0 pg 36.0 (H)   MCHC 32.0 - 36.0 g/dL 35.4   RED CELL DISTRIBUTION WIDTH 11.5 - 14.5 % 15.8 (H)   Platelets 150 - 450 x10*3/uL 105 (L)   (L): Data is abnormally low  (H): Data is abnormally high   Latest Reference Range & Units 09/11/24 15:56    Coronavirus 2019, PCR Not Detected  Detected !   !: Data is abnormal   Latest Reference Range & Units 09/11/24 15:54   Alcohol 0.000 - 0.010 g/dL <0.010      Latest Reference Range & Units 09/11/24 15:54   Troponin T, High Sensitivity <=14 ng/L 12           PLAN: PENDING                   Devisa RADHA Wolfe RN  09/11/24 [id number]

## 2024-09-11 NOTE — ED PROVIDER NOTES
HPI   Chief Complaint   Patient presents with    Weakness, Gen       HPI        Patient History   Past Medical History:   Diagnosis Date    Bipolar I disorder (Multi)     CVA (cerebral vascular accident) (Multi)     Duodenal cancer (Multi) 2019    Epilepsy (Multi)     Hyperlipidemia     Hypertension     Tobacco use     Urethral stricture      Past Surgical History:   Procedure Laterality Date    ADENOIDECTOMY      URETHRA SURGERY      Urethral stricture repair    WHIPPLE PROCEDURE  2019    Diagnostic laparoscopy, open laparotomy for pylorus-preserving pancreaticoduodenectomy, pancreaticojejunostomy, hepaticojejunostomy, gastrojejunostomy and liver biopsy     Family History   Problem Relation Name Age of Onset    Stroke Mother      Bipolar disorder Mother      Prostate cancer Father      Hypertension Sister      Hyperlipidemia Sister      Asthma Son      Colon cancer Paternal Grandfather       Social History     Tobacco Use    Smoking status: Former     Current packs/day: 0.00     Average packs/day: 0.3 packs/day for 37.6 years (9.4 ttl pk-yrs)     Types: Cigarettes     Start date:      Quit date: 2024     Years since quittin.1    Smokeless tobacco: Not on file   Substance Use Topics    Alcohol use: Yes     Comment: He reports that he drinks alcohol occasionally    Drug use: Not on file       Physical Exam   ED Triage Vitals [24 1424]   Temperature Heart Rate Respirations BP   36.6 °C (97.9 °F) 100 18 119/69      Pulse Ox Temp src Heart Rate Source Patient Position   97 % -- -- --      BP Location FiO2 (%)     -- --       Physical Exam      ED Course & MDM   Diagnoses as of 24 0025   Malaise and fatigue   Transaminitis   Hyperbilirubinemia   COVID-19   Adult failure to thrive syndrome                 No data recorded     Alejandra Coma Scale Score: 15 (24 1426 : [place] LPN)                           Medical Decision Making      The patient is a 54-year-old male  presenting to the emergency department for evaluation of generalized malaise fatigue and a feeling that he is he dehydrated.  He states that these are all chronic issues but his family wanted him to come to the emergency room to be evaluated again today.  He was recently seen in the emergency room for similar symptoms.  He states that he does not believe that anyone has tried to make an appointment to follow-up with his primary care physician.  He denies any headache or visual changes.  He denies any chest pain or shortness of breath.  He does report a chronic hernia of his abdomen but he states he has not noticed any recent changes in it.  He does not know when he last had a bowel movement.  He denies any nausea or vomiting.  He denies any urinary complaints.  No fever or chills.  No cough or congestion.  All pertinent positives and negatives are recorded above.  All other systems reviewed and otherwise negative.  Vital signs within normal limits.  Physical exam with a well-nourished well-developed male with disheveled appearance but no evidence of acute distress.  HEENT exam with dry mucous membranes but otherwise unremarkable.  He has no evidence of airway compromise or respiratory distress.  Abdominal exam with a large ventral hernia but no rebound or guarding.  No significant tenderness to palpation.  He does not have any gross motor, neurologic or vascular deficits on exam.  Pulses are equal bilaterally.      EKG with normal sinus rhythm at 94 bpm, normal axis, normal voltage, normal ST segment, no diffuse flattening of the T waves      IV fluids ordered.      Diagnostic labs with a positive COVID-19 test, mild electrolyte imbalance, transaminitis, hyperbilirubinemia, but otherwise unremarkable      UA pending at the time of admission      Initial troponin T 12. Repeat trop T 10      CT abdomen pelvis wo IV contrast   Final Result   No definite evidence of acute ventral hernia incarceration. Grossly   similar  appearance of midline large wide-neck bowel containing   supraumbilical and small right lateral narrow-neck fluid containing   paraumbilical hernias.        Stable nonemergent findings as described.        MACRO:   None        Signed by: Ronaldo Link 9/11/2024 6:35 PM   Dictation workstation:   [id number]      XR chest 1 view   Final Result   1. Improved left-sided pleural effusion and infiltrate with however   moderate residual.                  MACRO:   None        Signed by: Jim Gomes 9/11/2024 4:12 PM   Dictation workstation:   [id number]           The patient does not have any evidence of hemodynamic instability in the emergency room.  He does not any gross motor, neurologic or vascular deficits on exam.  He does have an electrolyte imbalance with some transaminitis and hyperbilirubinemia on diagnostic labs but these are reportedly chronic issues (per the pt and confirmed by review of the previous lab values, though they are worsening).  He also has a positive COVID-19 test.  Chest x-ray with improved left-sided pleural effusion and infiltrate but still residual.  Right upper quadrant ultrasound was ordered but was pending at the time of admission      The patient was recently admitted for similar symptoms.  The hospitalist request that I speak to Dr. Brunson since she did recently admit the patient.  She did accept the patient for further management.  She states that she will admit him for further evaluation of his current symptoms and will work on trying to place him for long-term care.      Impression/diagnosis  Malaise and fatigue  Volume depletion  Ventral hernia  COVID-19  Electrolyte imbalance  Transaminitis  Hyperbilirubinemia      Critical care time billing is not warranted at this time      I independently reviewed the results of the EKG and diagnostic labs      I reviewed the results of the diagnostic labs and diagnostic imaging.  Formal radiology reading was completed by the  radiologist      Procedure  Procedures     Anayeli Delarosa MD  09/11/24 2017       Anayeli Delarosa MD  09/12/24 0025

## 2024-09-12 LAB
ALBUMIN SERPL-MCNC: 1.8 G/DL (ref 3.5–5)
ALP BLD-CCNC: 123 U/L (ref 35–125)
ALT SERPL-CCNC: 29 U/L (ref 5–40)
ANION GAP SERPL CALC-SCNC: 7 MMOL/L
AST SERPL-CCNC: 173 U/L (ref 5–40)
BILIRUB SERPL-MCNC: 4.8 MG/DL (ref 0.1–1.2)
BUN SERPL-MCNC: 5 MG/DL (ref 8–25)
CALCIUM SERPL-MCNC: 6.8 MG/DL (ref 8.5–10.4)
CHLORIDE SERPL-SCNC: 98 MMOL/L (ref 97–107)
CO2 SERPL-SCNC: 27 MMOL/L (ref 24–31)
CREAT SERPL-MCNC: 0.4 MG/DL (ref 0.4–1.6)
EGFRCR SERPLBLD CKD-EPI 2021: >90 ML/MIN/1.73M*2
ERYTHROCYTE [DISTWIDTH] IN BLOOD BY AUTOMATED COUNT: 15.8 % (ref 11.5–14.5)
GLUCOSE SERPL-MCNC: 119 MG/DL (ref 65–99)
HCT VFR BLD AUTO: 29.9 % (ref 41–52)
HGB BLD-MCNC: 10.4 G/DL (ref 13.5–17.5)
HOLD SPECIMEN: NORMAL
MCH RBC QN AUTO: 35 PG (ref 26–34)
MCHC RBC AUTO-ENTMCNC: 34.8 G/DL (ref 32–36)
MCV RBC AUTO: 101 FL (ref 80–100)
NRBC BLD-RTO: 0.6 /100 WBCS (ref 0–0)
PLATELET # BLD AUTO: 71 X10*3/UL (ref 150–450)
POTASSIUM SERPL-SCNC: 3.3 MMOL/L (ref 3.4–5.1)
PROT SERPL-MCNC: 5.6 G/DL (ref 5.9–7.9)
RBC # BLD AUTO: 2.97 X10*6/UL (ref 4.5–5.9)
SODIUM SERPL-SCNC: 132 MMOL/L (ref 133–145)
WBC # BLD AUTO: 3.6 X10*3/UL (ref 4.4–11.3)

## 2024-09-12 PROCEDURE — 36415 COLL VENOUS BLD VENIPUNCTURE: CPT | Performed by: NURSE PRACTITIONER

## 2024-09-12 PROCEDURE — 1200000002 HC GENERAL ROOM WITH TELEMETRY DAILY

## 2024-09-12 PROCEDURE — 2500000004 HC RX 250 GENERAL PHARMACY W/ HCPCS (ALT 636 FOR OP/ED): Performed by: NURSE PRACTITIONER

## 2024-09-12 PROCEDURE — 2500000001 HC RX 250 WO HCPCS SELF ADMINISTERED DRUGS (ALT 637 FOR MEDICARE OP): Performed by: NURSE PRACTITIONER

## 2024-09-12 PROCEDURE — 97166 OT EVAL MOD COMPLEX 45 MIN: CPT | Mod: GO

## 2024-09-12 PROCEDURE — 85027 COMPLETE CBC AUTOMATED: CPT | Performed by: NURSE PRACTITIONER

## 2024-09-12 PROCEDURE — 3E0DX3Z INTRODUCTION OF ANTI-INFLAMMATORY INTO MOUTH AND PHARYNX, EXTERNAL APPROACH: ICD-10-PCS | Performed by: INTERNAL MEDICINE

## 2024-09-12 PROCEDURE — XW033E5 INTRODUCTION OF REMDESIVIR ANTI-INFECTIVE INTO PERIPHERAL VEIN, PERCUTANEOUS APPROACH, NEW TECHNOLOGY GROUP 5: ICD-10-PCS | Performed by: INTERNAL MEDICINE

## 2024-09-12 PROCEDURE — 97162 PT EVAL MOD COMPLEX 30 MIN: CPT | Mod: GP

## 2024-09-12 PROCEDURE — 2500000002 HC RX 250 W HCPCS SELF ADMINISTERED DRUGS (ALT 637 FOR MEDICARE OP, ALT 636 FOR OP/ED): Performed by: NURSE PRACTITIONER

## 2024-09-12 PROCEDURE — 80053 COMPREHEN METABOLIC PANEL: CPT | Performed by: NURSE PRACTITIONER

## 2024-09-12 RX ORDER — ACETAMINOPHEN 325 MG/1
650 TABLET ORAL EVERY 6 HOURS PRN
Status: DISCONTINUED | OUTPATIENT
Start: 2024-09-12 | End: 2024-09-17 | Stop reason: HOSPADM

## 2024-09-12 RX ORDER — DEXAMETHASONE 6 MG/1
6 TABLET ORAL DAILY
Status: DISCONTINUED | OUTPATIENT
Start: 2024-09-12 | End: 2024-09-17 | Stop reason: HOSPADM

## 2024-09-12 SDOH — SOCIAL STABILITY: SOCIAL INSECURITY: ABUSE: ADULT

## 2024-09-12 SDOH — ECONOMIC STABILITY: INCOME INSECURITY: HOW HARD IS IT FOR YOU TO PAY FOR THE VERY BASICS LIKE FOOD, HOUSING, MEDICAL CARE, AND HEATING?: NOT VERY HARD

## 2024-09-12 SDOH — SOCIAL STABILITY: SOCIAL INSECURITY: WITHIN THE LAST YEAR, HAVE YOU BEEN HUMILIATED OR EMOTIONALLY ABUSED IN OTHER WAYS BY YOUR PARTNER OR EX-PARTNER?: NO

## 2024-09-12 SDOH — ECONOMIC STABILITY: INCOME INSECURITY: IN THE LAST 12 MONTHS, WAS THERE A TIME WHEN YOU WERE NOT ABLE TO PAY THE MORTGAGE OR RENT ON TIME?: NO

## 2024-09-12 SDOH — ECONOMIC STABILITY: FOOD INSECURITY: WITHIN THE PAST 12 MONTHS, THE FOOD YOU BOUGHT JUST DIDN'T LAST AND YOU DIDN'T HAVE MONEY TO GET MORE.: NEVER TRUE

## 2024-09-12 SDOH — SOCIAL STABILITY: SOCIAL INSECURITY: WITHIN THE LAST YEAR, HAVE YOU BEEN AFRAID OF YOUR PARTNER OR EX-PARTNER?: NO

## 2024-09-12 SDOH — ECONOMIC STABILITY: FOOD INSECURITY: WITHIN THE PAST 12 MONTHS, YOU WORRIED THAT YOUR FOOD WOULD RUN OUT BEFORE YOU GOT MONEY TO BUY MORE.: NEVER TRUE

## 2024-09-12 SDOH — ECONOMIC STABILITY: HOUSING INSECURITY: AT ANY TIME IN THE PAST 12 MONTHS, WERE YOU HOMELESS OR LIVING IN A SHELTER (INCLUDING NOW)?: NO

## 2024-09-12 SDOH — ECONOMIC STABILITY: INCOME INSECURITY: IN THE PAST 12 MONTHS, HAS THE ELECTRIC, GAS, OIL, OR WATER COMPANY THREATENED TO SHUT OFF SERVICE IN YOUR HOME?: NO

## 2024-09-12 SDOH — ECONOMIC STABILITY: HOUSING INSECURITY: IN THE PAST 12 MONTHS, HOW MANY TIMES HAVE YOU MOVED WHERE YOU WERE LIVING?: 0

## 2024-09-12 SDOH — SOCIAL STABILITY: SOCIAL INSECURITY: HAVE YOU HAD THOUGHTS OF HARMING ANYONE ELSE?: NO

## 2024-09-12 ASSESSMENT — ENCOUNTER SYMPTOMS
ALLERGIC/IMMUNOLOGIC NEGATIVE: 1
MUSCULOSKELETAL NEGATIVE: 1
SHORTNESS OF BREATH: 1
ACTIVITY CHANGE: 1
WOUND: 0
WHEEZING: 0
FATIGUE: 1
ABDOMINAL PAIN: 1
VOMITING: 0
ABDOMINAL DISTENTION: 1
CHILLS: 0
DIARRHEA: 0
CARDIOVASCULAR NEGATIVE: 1
NAUSEA: 0
WEAKNESS: 1
COUGH: 1
PSYCHIATRIC NEGATIVE: 1
ABDOMINAL PAIN: 0
EYES NEGATIVE: 1
ENDOCRINE NEGATIVE: 1
FEVER: 0

## 2024-09-12 ASSESSMENT — ACTIVITIES OF DAILY LIVING (ADL)
ADEQUATE_TO_COMPLETE_ADL: YES
BATHING_ASSISTANCE: MODERATE
HEARING - LEFT EAR: FUNCTIONAL
FEEDING YOURSELF: INDEPENDENT
HEARING - RIGHT EAR: FUNCTIONAL
LACK_OF_TRANSPORTATION: NO
PATIENT'S MEMORY ADEQUATE TO SAFELY COMPLETE DAILY ACTIVITIES?: YES
JUDGMENT_ADEQUATE_SAFELY_COMPLETE_DAILY_ACTIVITIES: YES
WALKS IN HOME: NEEDS ASSISTANCE
DRESSING YOURSELF: NEEDS ASSISTANCE
GROOMING: NEEDS ASSISTANCE
ADEQUATE_TO_COMPLETE_ADL: YES
ADL_ASSISTANCE: INDEPENDENT
DRESSING YOURSELF: NEEDS ASSISTANCE
GROOMING: NEEDS ASSISTANCE
ADL_ASSISTANCE: NEEDS ASSISTANCE
FEEDING YOURSELF: INDEPENDENT
BATHING: NEEDS ASSISTANCE
HEARING - RIGHT EAR: FUNCTIONAL
TOILETING: NEEDS ASSISTANCE
BATHING: NEEDS ASSISTANCE
HEARING - LEFT EAR: FUNCTIONAL
LACK_OF_TRANSPORTATION: NO
TOILETING: NEEDS ASSISTANCE
WALKS IN HOME: NEEDS ASSISTANCE
PATIENT'S MEMORY ADEQUATE TO SAFELY COMPLETE DAILY ACTIVITIES?: YES
JUDGMENT_ADEQUATE_SAFELY_COMPLETE_DAILY_ACTIVITIES: YES

## 2024-09-12 ASSESSMENT — COGNITIVE AND FUNCTIONAL STATUS - GENERAL
MOVING TO AND FROM BED TO CHAIR: A LOT
EATING MEALS: A LITTLE
DRESSING REGULAR LOWER BODY CLOTHING: A LOT
MOVING TO AND FROM BED TO CHAIR: A LITTLE
TURNING FROM BACK TO SIDE WHILE IN FLAT BAD: A LITTLE
TURNING FROM BACK TO SIDE WHILE IN FLAT BAD: A LITTLE
MOVING FROM LYING ON BACK TO SITTING ON SIDE OF FLAT BED WITH BEDRAILS: A LOT
MOVING FROM LYING ON BACK TO SITTING ON SIDE OF FLAT BED WITH BEDRAILS: A LITTLE
MOBILITY SCORE: 15
DRESSING REGULAR UPPER BODY CLOTHING: A LITTLE
DRESSING REGULAR UPPER BODY CLOTHING: A LOT
STANDING UP FROM CHAIR USING ARMS: A LITTLE
HELP NEEDED FOR BATHING: A LOT
WALKING IN HOSPITAL ROOM: A LOT
STANDING UP FROM CHAIR USING ARMS: A LITTLE
EATING MEALS: A LITTLE
MOVING TO AND FROM BED TO CHAIR: A LITTLE
TURNING FROM BACK TO SIDE WHILE IN FLAT BAD: A LOT
HELP NEEDED FOR BATHING: A LOT
DRESSING REGULAR LOWER BODY CLOTHING: A LITTLE
PATIENT BASELINE BEDBOUND: NO
WALKING IN HOSPITAL ROOM: A LOT
TOILETING: A LITTLE
TOILETING: A LITTLE
STANDING UP FROM CHAIR USING ARMS: A LITTLE
MOBILITY SCORE: 11
STANDING UP FROM CHAIR USING ARMS: A LOT
CLIMB 3 TO 5 STEPS WITH RAILING: TOTAL
MOBILITY SCORE: 16
MOBILITY SCORE: 16
DAILY ACTIVITIY SCORE: 15
MOVING FROM LYING ON BACK TO SITTING ON SIDE OF FLAT BED WITH BEDRAILS: A LITTLE
MOVING FROM LYING ON BACK TO SITTING ON SIDE OF FLAT BED WITH BEDRAILS: A LITTLE
TOILETING: A LOT
MOVING TO AND FROM BED TO CHAIR: A LITTLE
HELP NEEDED FOR BATHING: A LOT
CLIMB 3 TO 5 STEPS WITH RAILING: TOTAL
TOILETING: A LITTLE
HELP NEEDED FOR BATHING: A LOT
PERSONAL GROOMING: A LITTLE
PERSONAL GROOMING: A LITTLE
WALKING IN HOSPITAL ROOM: A LOT
DRESSING REGULAR LOWER BODY CLOTHING: A LOT
DAILY ACTIVITIY SCORE: 17
DAILY ACTIVITIY SCORE: 18
DRESSING REGULAR UPPER BODY CLOTHING: A LITTLE
PERSONAL GROOMING: A LITTLE
WALKING IN HOSPITAL ROOM: A LITTLE
TURNING FROM BACK TO SIDE WHILE IN FLAT BAD: A LITTLE
DRESSING REGULAR UPPER BODY CLOTHING: A LITTLE
CLIMB 3 TO 5 STEPS WITH RAILING: TOTAL
DRESSING REGULAR LOWER BODY CLOTHING: A LOT
DAILY ACTIVITIY SCORE: 15
CLIMB 3 TO 5 STEPS WITH RAILING: A LOT
PERSONAL GROOMING: A LITTLE

## 2024-09-12 ASSESSMENT — PAIN SCALES - GENERAL
PAINLEVEL_OUTOF10: 0 - NO PAIN
PAINLEVEL_OUTOF10: 5 - MODERATE PAIN
PAINLEVEL_OUTOF10: 0 - NO PAIN
PAINLEVEL_OUTOF10: 0 - NO PAIN

## 2024-09-12 ASSESSMENT — PAIN - FUNCTIONAL ASSESSMENT
PAIN_FUNCTIONAL_ASSESSMENT: 0-10

## 2024-09-12 ASSESSMENT — PATIENT HEALTH QUESTIONNAIRE - PHQ9
2. FEELING DOWN, DEPRESSED OR HOPELESS: MORE THAN HALF THE DAYS
SUM OF ALL RESPONSES TO PHQ9 QUESTIONS 1 & 2: 4
1. LITTLE INTEREST OR PLEASURE IN DOING THINGS: MORE THAN HALF THE DAYS

## 2024-09-12 ASSESSMENT — LIFESTYLE VARIABLES
AUDIT-C TOTAL SCORE: 0
HOW OFTEN DO YOU HAVE 6 OR MORE DRINKS ON ONE OCCASION: NEVER
HOW MANY STANDARD DRINKS CONTAINING ALCOHOL DO YOU HAVE ON A TYPICAL DAY: PATIENT DOES NOT DRINK
AUDIT-C TOTAL SCORE: 0
HOW OFTEN DO YOU HAVE A DRINK CONTAINING ALCOHOL: NEVER
SKIP TO QUESTIONS 9-10: 1

## 2024-09-12 NOTE — NURSING NOTE
Assumed care of this pt. Pt is lying in bed, breathing even and unlabored on 2L NC. NAD. Bed low, call light and belongings in reach. Continuing to monitor

## 2024-09-12 NOTE — PROGRESS NOTES
Physical Therapy    Physical Therapy Evaluation    Patient Name: Nils Hogan  MRN: [id number]  Department: [facility] E  Room: [facility]45-  Today's Date: 9/12/2024   Time Calculation  Start Time: 1303  Stop Time: 1329  Time Calculation (min): 26 min    Assessment/Plan   PT Assessment  PT Assessment Results: Decreased strength, Decreased endurance, Impaired balance, Decreased mobility, Decreased coordination, Impaired judgement, Pain  Rehab Prognosis: Good  Evaluation/Treatment Tolerance: Patient limited by fatigue  Medical Staff Made Aware: Yes  End of Session Communication: Bedside nurse  Assessment Comment: pt would benefit from skilled therapy services; pt is not safe to return home alone  End of Session Patient Position: Up in chair, Alarm on (call button in reach)  IP OR SWING BED PT PLAN  Inpatient or Swing Bed: Inpatient  PT Plan  Treatment/Interventions: Bed mobility, Transfer training, Gait training, Balance training, Strengthening, Endurance training, Therapeutic exercise  PT Plan: Ongoing PT  PT Frequency: 4 times per week  PT Discharge Recommendations: Moderate intensity level of continued care  Equipment Recommended upon Discharge: Wheeled walker  PT Recommended Transfer Status: Assist x2  PT - OK to Discharge: Yes      Subjective   General Visit Information:  General  Reason for Referral: pt is a 53 y/o male admitted with malaise and fatigue; pt found to have COVID; pt sustained a fall; impaired mobility  Referred By: Dr. Brunson  Past Medical History Relevant to Rehab: hernia; HTN; acites; duodenal CA s/p Whipple surgery; wayne; liver cirrhosis; CVA with Left sided weakness; hepaticojejunostomy; anemia; UTI; ETOH  Co-Treatment: OT  Co-Treatment Reason: physically and medically complex patient requiring 2 person assist for safety  Prior to Session Communication: Bedside nurse  Patient Position Received: Bed, 2 rail up, Alarm on  General Comment: pt alert and willing to work with therapy      Home Living:  Home  Living  Type of Home: Mobile home  Lives With: Alone  Home Adaptive Equipment:  (rollator)  Home Layout: One level, Laundry main level  Home Access: Ramped entrance  Bathroom Shower/Tub: Tub/shower unit  Bathroom Equipment: Shower chair with back      Prior Level of Function:  Prior Function Per Pt/Caregiver Report  Level of [place]: Independent with ADLs and functional transfers, Independent with homemaking with ambulation  Receives Help From: Neighbor (also has health Aide 5x/week for 2-3 hours a day)  ADL Assistance: Independent (+ sponge bathes)  Homemaking Assistance: Needs assistance  Ambulatory Assistance: Independent (using rollator)  Prior Function Comments: pt reported managing well at home; pt uses [employer] for transportation    Precautions:  Precautions  Medical Precautions: Fall precautions, Infection precautions  Precautions Comment: + COVID isolation; educated pt on safety techniques during mobility          Objective   Pain:  Pain Assessment  Pain Assessment:  (6/10 abdominal discomfort; RN to medicate)  Cognition:  Cognition  Overall Cognitive Status: Within Functional Limits  Orientation Level: Oriented X4  Safety/Judgement:  (impaired safety awareness)  Processing Speed: Delayed    General Assessments:  General Observation  General Observation: + moderate abdominal distention noted       Activity Tolerance  Endurance:  (FAIR- activity tolerance)    Sensation  Sensation Comment: c/o numbness/tingling B feet          Coordination  Coordination Comment: pt tremulous during WB; inconsistent steps    Postural Control  Posture Comment: forward head down posture    Static Sitting Balance  Static Sitting-Comment/Number of Minutes: GOOD-  Dynamic Sitting Balance  Dynamic Sitting-Comments: GOOD-/FAIR+    Static Standing Balance  Static Standing-Comment/Number of Minutes: FAIR-  Dynamic Standing Balance  Dynamic Standing-Comments: POOR+      Functional Assessments:    Bed Mobility:  supine to sit  via log rolling with MOD A for trunk up, scooting and B LE. pt uses bed rail for support.      Transfer:  sit <-> stand with MOD A x 2; increased time and effort noted. pt unable to stand fully erect; FAIR eccentric control during stand to sit transfer.      Ambulation/Gait Training Performed:  pt took 6-7 very short inconsistent step using rollator with MOD A x 2 to steady; pt appeared tremulous and had difficulty advancing steps; soft B knees noted; pt easily fatigued          Extremity/Trunk Assessments:  RUE   RUE : Within Functional Limits  LUE   LUE: Within Functional Limits  RLE   RLE :  (WFL wtih 3-/5 strength; + redness and swelling noted)  LLE   LLE :  (WFL wtih 3-/5 strength; + redness and swelling noted)      Outcome Measures:  [facility] Basic Mobility  Turning from your back to your side while in a flat bed without using bedrails: A lot  Moving from lying on your back to sitting on the side of a flat bed without using bedrails: A lot  Moving to and from bed to chair (including a wheelchair): A lot  Standing up from a chair using your arms (e.g. wheelchair or bedside chair): A lot  To walk in hospital room: A lot  Climbing 3-5 steps with railing: Total  Basic Mobility - Total Score: 11    Encounter Problems       Encounter Problems (Active)       Mobility       STG - Patient will ambulate 60' x 1 using rolling walker with SBA (Progressing)       Start:  09/12/24    Expected End:  09/26/24               PT Transfers       STG - Patient to transfer to and from sit to supine SUPERVISION (Progressing)       Start:  09/12/24    Expected End:  09/26/24            STG - Patient will transfer sit to and from stand CGA (Progressing)       Start:  09/12/24    Expected End:  09/26/24               Pain - Adult              Education Documentation  Precautions, taught by Austin Valdez, PT at 9/12/2024  2:48 PM.  Learner: Patient  Readiness: Eager  Method: Explanation  Response: Verbalizes Understanding    Mobility  Training, taught by Austin Valdez, PT at 9/12/2024  2:48 PM.  Learner: Patient  Readiness: Eager  Method: Explanation  Response: Verbalizes Understanding    Education Comments  No comments found.

## 2024-09-12 NOTE — PROGRESS NOTES
Physical Therapy    Physical Therapy Evaluation    Patient Name: Nils Hogan  MRN: [id number]  Department: [facility] E  Room: [facility]45-  Today's Date: 9/12/2024   Time Calculation  Start Time: 1303  Stop Time: 1329  Time Calculation (min): 26 min    Assessment/Plan   PT Assessment  PT Assessment Results: Decreased strength, Decreased endurance, Impaired balance, Decreased mobility, Decreased coordination, Impaired judgement, Pain  Rehab Prognosis: Good  Evaluation/Treatment Tolerance: Patient limited by fatigue  Medical Staff Made Aware: Yes  End of Session Communication: Bedside nurse  Assessment Comment: pt would benefit from skilled therapy services; pt is not safe to return home alone  End of Session Patient Position: Up in chair, Alarm on (call button in reach)  IP OR SWING BED PT PLAN  Inpatient or Swing Bed: Inpatient  PT Plan  Treatment/Interventions: Bed mobility, Transfer training, Gait training, Balance training, Strengthening, Endurance training, Therapeutic exercise  PT Plan: Ongoing PT  PT Frequency: 3 times per week  PT Discharge Recommendations: Moderate intensity level of continued care  Equipment Recommended upon Discharge: Wheeled walker  PT Recommended Transfer Status: Assist x2  PT - OK to Discharge: Yes      Subjective   General Visit Information:  General  Reason for Referral: pt is a 55 y/o male admitted with malaise and fatigue; pt found to have COVID; pt sustained a fall; impaired mobility  Referred By: Dr. Brunson  Past Medical History Relevant to Rehab: hernia; HTN; acites; duodenal CA s/p Whipple surgery; wayne; liver cirrhosis; CVA with Left sided weakness; hepaticojejunostomy; anemia; UTI; ETOH  Co-Treatment: OT  Co-Treatment Reason: physically and medically complex patient requiring 2 person assist for safety  Prior to Session Communication: Bedside nurse  Patient Position Received: Bed, 2 rail up, Alarm on  General Comment: pt alert and willing to work with therapy      Home Living:  Home  Living  Type of Home: Mobile home  Lives With: Alone  Home Adaptive Equipment:  (rollator)  Home Layout: One level, Laundry main level  Home Access: Ramped entrance  Bathroom Shower/Tub: Tub/shower unit  Bathroom Equipment: Shower chair with back      Prior Level of Function:  Prior Function Per Pt/Caregiver Report  Level of [place]: Independent with ADLs and functional transfers, Independent with homemaking with ambulation  Receives Help From: Neighbor (also has health Aide 5x/week for 2-3 hours a day)  ADL Assistance: Independent (+ sponge bathes)  Homemaking Assistance: Needs assistance  Ambulatory Assistance: Independent (using rollator)  Prior Function Comments: pt reported managing well at home; pt uses [employer] for transportation    Precautions:  Precautions  Medical Precautions: Fall precautions, Infection precautions  Precautions Comment: + COVID isolation; educated pt on safety techniques during mobility          Objective   Pain:  Pain Assessment  Pain Assessment:  (6/10 abdominal discomfort; RN to medicate)  Cognition:  Cognition  Overall Cognitive Status: Within Functional Limits  Orientation Level: Oriented X4  Safety/Judgement:  (impaired safety awareness)  Processing Speed: Delayed    General Assessments:  General Observation  General Observation: + moderate abdominal distention noted       Activity Tolerance  Endurance:  (FAIR- activity tolerance)    Sensation  Sensation Comment: c/o numbness/tingling B feet          Coordination  Coordination Comment: pt tremulous during WB; inconsistent steps    Postural Control  Posture Comment: forward head down posture    Static Sitting Balance  Static Sitting-Comment/Number of Minutes: GOOD-  Dynamic Sitting Balance  Dynamic Sitting-Comments: GOOD-/FAIR+    Static Standing Balance  Static Standing-Comment/Number of Minutes: FAIR-  Dynamic Standing Balance  Dynamic Standing-Comments: POOR+      Functional Assessments:    Bed Mobility:  supine to sit  via log rolling with MOD A for trunk up, scooting and B LE. pt uses bed rail for support.      Transfer:  sit <-> stand with MOD A x 2; increased time and effort noted. pt unable to stand fully erect; FAIR eccentric control during stand to sit transfer.      Ambulation/Gait Training Performed:  pt took 6-7 very short inconsistent step using rollator with MOD A x 2 to steady; pt appeared tremulous and had difficulty advancing steps; soft B knees noted; pt easily fatigued          Extremity/Trunk Assessments:  RUE   RUE : Within Functional Limits  LUE   LUE: Within Functional Limits  RLE   RLE :  (WFL wtih 3-/5 strength; + redness and swelling noted)  LLE   LLE :  (WFL wtih 3-/5 strength; + redness and swelling noted)      Outcome Measures:  [facility] Basic Mobility  Turning from your back to your side while in a flat bed without using bedrails: A lot  Moving from lying on your back to sitting on the side of a flat bed without using bedrails: A lot  Moving to and from bed to chair (including a wheelchair): A lot  Standing up from a chair using your arms (e.g. wheelchair or bedside chair): A lot  To walk in hospital room: A lot  Climbing 3-5 steps with railing: Total  Basic Mobility - Total Score: 11    Encounter Problems       Encounter Problems (Active)       Mobility       STG - Patient will ambulate 60' x 1 using rolling walker with SBA (Progressing)       Start:  09/12/24    Expected End:  09/26/24               PT Transfers       STG - Patient to transfer to and from sit to supine SUPERVISION (Progressing)       Start:  09/12/24    Expected End:  09/26/24            STG - Patient will transfer sit to and from stand CGA (Progressing)       Start:  09/12/24    Expected End:  09/26/24               Pain - Adult              Education Documentation  Precautions, taught by Austin Valdez, PT at 9/12/2024  2:48 PM.  Learner: Patient  Readiness: Eager  Method: Explanation  Response: Verbalizes Understanding    Mobility  Training, taught by Austin Valdez, PT at 9/12/2024  2:48 PM.  Learner: Patient  Readiness: Eager  Method: Explanation  Response: Verbalizes Understanding    Education Comments  No comments found.

## 2024-09-12 NOTE — H&P
Chief Complaint: Weakness    History Of Present Illness  Nils Hogan is a 54 y.o. 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 Patient presented to the ED today due to weakness, shortness of breath, and fatigue for the past couple days. Patient also makes note that he is having dark urine. Patient denies any nausea or vomiting. Denies any chest pain. Patient does have chronic abdominal pain with wound. He does make note of loose and dark stool but states that it is baseline.      In the ED work up revealed a blood glucose of 117, sodium 129, potassium 3.2, creatinine 0.50, GFR >90, ALT 47, , lipase <16, WBC 4.9, hemoglobin 13.2, hematocrit 37.3, platelets 105. UA was positive for WBC and RBC.  CT of abdomen revealed No definite evidence of acute ventral hernia incarceration. Grossly similar appearance of midline large wide-neck bowel containing supraumbilical and small right lateral narrow-neck fluid containing paraumbilical hernias. Stable nonemergent findings as described. Chest xray revealed Improved left-sided pleural effusion and infiltrate with however moderate residual. In the ED the patient was given IV 1L fluid bolus.      Past Medical History  Past Medical History:   Diagnosis Date    Bipolar I disorder (Multi)     CVA (cerebral vascular accident) (Multi)     Duodenal cancer (Multi) 03/2019    Epilepsy (Multi)     Hyperlipidemia     Hypertension     Tobacco use     Urethral stricture        Surgical History  Past Surgical History:   Procedure Laterality Date    ADENOIDECTOMY      URETHRA SURGERY      Urethral stricture repair    WHIPPLE PROCEDURE  04/22/2019    Diagnostic laparoscopy, open laparotomy for pylorus-preserving  pancreaticoduodenectomy, pancreaticojejunostomy, hepaticojejunostomy, gastrojejunostomy and liver biopsy        Social History  He reports that he quit smoking about 5 weeks ago. His smoking use included cigarettes. He started smoking about 37 years ago. He has a 9.4 pack-year smoking history. He does not have any smokeless tobacco history on file. He reports current alcohol use. No history on file for drug use.    Family History  Family History   Problem Relation Name Age of Onset    Stroke Mother      Bipolar disorder Mother      Prostate cancer Father      Hypertension Sister      Hyperlipidemia Sister      Asthma Son      Colon cancer Paternal Grandfather          Allergies  Diazepam, Iodinated contrast media, Iodine, Zosyn [piperacillin-tazobactam], Acetaminophen, and Phenytoin sodium extended    Review of Systems   Constitutional:  Positive for fatigue.   HENT: Negative.     Eyes: Negative.    Respiratory:  Positive for shortness of breath.    Cardiovascular: Negative.    Gastrointestinal:  Positive for abdominal distention and abdominal pain.   Endocrine: Negative.    Genitourinary:  Positive for hematuria.   Musculoskeletal: Negative.    Allergic/Immunologic: Negative.    Neurological:  Positive for light-headedness.   Hematological: Negative.    Psychiatric/Behavioral: Negative.     All other systems reviewed and are negative.       Physical Exam  Vitals and nursing note reviewed.   Constitutional:       Appearance: He is ill-appearing.   Cardiovascular:      Rate and Rhythm: Normal rate and regular rhythm.      Pulses: Normal pulses.      Heart sounds: Normal heart sounds.   Pulmonary:      Effort: Pulmonary effort is normal.      Breath sounds: Normal breath sounds.   Abdominal:      General: Bowel sounds are normal. There is distension.      Tenderness: There is abdominal tenderness.      Hernia: A hernia is present.   Musculoskeletal:         General: Normal range of motion.   Skin:     General: Skin is  "warm and dry.      Capillary Refill: Capillary refill takes less than 2 seconds.   Neurological:      General: No focal deficit present.      Mental Status: He is alert. Mental status is at baseline.   Psychiatric:         Mood and Affect: Mood normal.         Behavior: Behavior normal.          Last Recorded Vitals  Blood pressure 132/77, pulse 93, temperature 36.6 °C (97.9 °F), resp. rate 16, height 1.727 m (5' 8\"), weight 71.7 kg (158 lb), SpO2 (!) 93%.    Relevant Results  Results for orders placed or performed during the hospital encounter of 09/11/24 (from the past 24 hour(s))   CBC and Auto Differential   Result Value Ref Range    WBC 4.9 4.4 - 11.3 x10*3/uL    nRBC 0.0 0.0 - 0.0 /100 WBCs    RBC 3.67 (L) 4.50 - 5.90 x10*6/uL    Hemoglobin 13.2 (L) 13.5 - 17.5 g/dL    Hematocrit 37.3 (L) 41.0 - 52.0 %     (H) 80 - 100 fL    MCH 36.0 (H) 26.0 - 34.0 pg    MCHC 35.4 32.0 - 36.0 g/dL    RDW 15.8 (H) 11.5 - 14.5 %    Platelets 105 (L) 150 - 450 x10*3/uL    Immature Granulocytes %, Automated 0.4 0.0 - 0.9 %    Immature Granulocytes Absolute, Automated 0.02 0.00 - 0.70 x10*3/uL   Lipase   Result Value Ref Range    Lipase <16 (L) 16 - 63 U/L   Comprehensive metabolic panel   Result Value Ref Range    Glucose 117 (H) 65 - 99 mg/dL    Sodium 129 (L) 133 - 145 mmol/L    Potassium 3.2 (L) 3.4 - 5.1 mmol/L    Chloride 90 (L) 97 - 107 mmol/L    Bicarbonate 29 24 - 31 mmol/L    Urea Nitrogen 5 (L) 8 - 25 mg/dL    Creatinine 0.50 0.40 - 1.60 mg/dL    eGFR >90 >60 mL/min/1.73m*2    Calcium 8.2 (L) 8.5 - 10.4 mg/dL    Albumin 2.4 (L) 3.5 - 5.0 g/dL    Alkaline Phosphatase 183 (H) 35 - 125 U/L    Total Protein 8.2 (H) 5.9 - 7.9 g/dL     (H) 5 - 40 U/L    Bilirubin, Total 7.4 (H) 0.1 - 1.2 mg/dL    ALT 47 (H) 5 - 40 U/L    Anion Gap 10 <=19 mmol/L   Urinalysis with Reflex Culture and Microscopic   Result Value Ref Range    Color, Urine Dark-Yellow Light-Yellow, Yellow, Dark-Yellow    Appearance, Urine Ex.Turbid " (N) Clear    Specific Gravity, Urine 1.021 1.005 - 1.035    pH, Urine 6.0 5.0, 5.5, 6.0, 6.5, 7.0, 7.5, 8.0    Protein, Urine 30 (1+) (A) NEGATIVE, 10 (TRACE), 20 (TRACE) mg/dL    Glucose, Urine Normal Normal mg/dL    Blood, Urine 0.03 (TRACE) (A) NEGATIVE    Ketones, Urine NEGATIVE NEGATIVE mg/dL    Bilirubin, Urine 3 (2+) (A) NEGATIVE    Urobilinogen, Urine 8 (3+) (A) Normal mg/dL    Nitrite, Urine NEGATIVE NEGATIVE    Leukocyte Esterase, Urine NEGATIVE NEGATIVE   Serial Troponin, Initial ([facility])   Result Value Ref Range    Troponin T, High Sensitivity 12 <=14 ng/L   Ethanol   Result Value Ref Range    Alcohol <0.010 0.000 - 0.010 g/dL   Manual Differential   Result Value Ref Range    Neutrophils %, Manual 80.0 40.0 - 80.0 %    Bands %, Manual 1.0 0.0 - 5.0 %    Lymphocytes %, Manual 15.0 13.0 - 44.0 %    Monocytes %, Manual 3.0 2.0 - 10.0 %    Eosinophils %, Manual 0.0 0.0 - 6.0 %    Basophils %, Manual 1.0 0.0 - 2.0 %    Seg Neutrophils Absolute, Manual 3.92 1.20 - 7.00 x10*3/uL    Bands Absolute, Manual 0.05 0.00 - 0.70 x10*3/uL    Lymphocytes Absolute, Manual 0.74 (L) 1.20 - 4.80 x10*3/uL    Monocytes Absolute, Manual 0.15 0.10 - 1.00 x10*3/uL    Eosinophils Absolute, Manual 0.00 0.00 - 0.70 x10*3/uL    Basophils Absolute, Manual 0.05 0.00 - 0.10 x10*3/uL    Total Cells Counted 100     Neutrophils Absolute, Manual 3.97 1.20 - 7.70 x10*3/uL    RBC Morphology See Below     Target Cells Few     Teardrop Cells Few     [place] Cells Few    Urinalysis Microscopic   Result Value Ref Range    WBC, Urine 11-20 (A) 1-5, NONE /HPF    RBC, Urine >20 (A) NONE, 1-2, 3-5 /HPF    Squamous Epithelial Cells, Urine 1-9 (SPARSE) Reference range not established. /HPF    Bacteria, Urine 1+ (A) NONE SEEN /HPF    Budding Yeast, Urine PRESENT (A) NONE /HPF    Mucus, Urine 4+ Reference range not established. /LPF    Hyaline Casts, Urine 4+ (A) NONE /LPF   Sars-CoV-2 PCR   Result Value Ref Range    Coronavirus 2019, PCR Detected (A) Not  Detected   Drug Screen, Urine   Result Value Ref Range    Amphetamine Screen, Urine Negative      Barbiturate Screen, Urine Negative      Benzodiazepines Screen, Urine Negative      Cannabinoid Screen, Urine Negative      Cocaine Metabolite Screen, Urine Negative      Fentanyl Screen, Urine Negative       Methadone Screen, Urine Negative      Opiate Screen, Urine Negative      Oxycodone Screen, Urine Negative      PCP Screen, Urine Negative     Serial Troponin, 2 Hour ([facility])   Result Value Ref Range    Troponin T, High Sensitivity 10 <=14 ng/L      US gallbladder    Result Date: 9/11/2024  Interpreted By:  Quentin Jung, STUDY: US GALLBLADDER;  9/11/2024 9:20 pm   INDICATION: Signs/Symptoms:Transaminitis, hyperbilirubinemia, abdominal pain/malaise.     COMPARISON: None.   ACCESSION NUMBER(S): [id number]   ORDERING CLINICIAN: DILLON KUNZ   TECHNIQUE: Multiple sonographic grayscale and color images of the right upper quadrant were performed.   FINDINGS: The examination is limited secondary to shadowing from overlying bowel gas.   There is heterogeneous echotexture of the liver and nodular contour. The patient is status post Whipple surgery with cholecystectomy and hepaticojejunostomy. Nonspecific cystic structure in the region of the left hepatic lobe. There is abdominal ascites.   No hydronephrosis right kidney.       Limited examination secondary to shadowing from overlying bowel gas.   Liver cirrhosis and abdominal ascites.   Postsurgical change of Whipple surgery with cholecystectomy and hepaticojejunostomy. Please note evaluation is limited on the ultrasound examination secondary to the shadowing and described postsurgical change, and if there is persistent concern for underlying pathology, follow-up contrast enhanced CT is recommended for further evaluation.   MACRO: None   Signed by: Quentin Jung 9/11/2024 9:43 PM Dictation workstation:   [id number]    CT abdomen pelvis wo IV contrast    Result Date:  9/11/2024  Interpreted By:  Ronaldo Link, STUDY: CT ABDOMEN PELVIS WO IV CONTRAST;  9/11/2024 5:57 pm   INDICATION: Signs/Symptoms:r/o incarceration of hernia.   COMPARISON: CT abdomen and pelvis 08/29/2024   ACCESSION NUMBER(S): [id number]   ORDERING CLINICIAN: DILLON KUNZ   TECHNIQUE: Axial CT of the abdomen and pelvis was performed without intravenous contrast with coronal and sagittal reconstructions.   FINDINGS: Lower chest: Grossly stable large left pleural effusion with adjacent atelectasis.   Liver: Cirrhosis. Stable 1.6 cm anterior right hepatic hypodense lesion (series 4, image 40).   Biliary: Stable trace pneumobilia related to hepaticojejunostomy. Cholecystectomy.   Spleen: No mass. Stable splenomegaly measuring 14 cm craniocaudally..   Pancreas: Status post Whipple. Limited evaluation of the presumed remnant pancreas which appears atrophic. No main duct dilation.   Adrenals: No mass.   Kidneys: No calculus or hydronephrosis.   GI tract: Status post Whipple. Apparent asymmetric rectal wall thickening measuring up to 1.9 cm, new since 08/29/2024. No bowel wall thickening of the remaining colon or small bowel. A few loops of mildly dilated small bowel within midline ventral hernia, not significantly changed. Colonic diverticulosis without acute inflammation.   Lymph nodes: Multiple prominent peripancreatic/periportal and mesenteric lymph nodes, likely reactive.   Mesentery/peritoneum: Stable moderate-size simple attenuation ascites. No free air or fluid collection.   Vasculature: Mild-to-moderate abdominal aortic atherosclerotic calcifications without aneurysmal dilation. Stable perihepatic, perisplenic and paraesophageal varices.   Pelvis: Stable moderate-size simple attenuation ascites. No free air or fluid collection. Stable moderate smooth circumferential bladder wall thickening with a posterolateral diverticulum, likely related to chronic urinary outlet obstruction. Enlarged prostate measuring  4.4 cm in transverse dimension.   Bones/Soft tissues: Redemonstration of a large midline supraumbilical ventral hernia with a 10 cm hernia neck. There are multiple loops of normal caliber and mildly dilated small bowel loops and small amount of free fluid within the hernia sac, similar to prior CT. There is an additional small right lateral paraumbilical hernia with a 2 cm neck containing fluid. Tiny fat containing bilateral inguinal hernias. Grossly unchanged lower lumbar degenerative disc disease.       No definite evidence of acute ventral hernia incarceration. Grossly similar appearance of midline large wide-neck bowel containing supraumbilical and small right lateral narrow-neck fluid containing paraumbilical hernias.   Stable nonemergent findings as described.   MACRO: None   Signed by: Ronaldo Link 9/11/2024 6:35 PM Dictation workstation:   [id number]    XR chest 1 view    Result Date: 9/11/2024  Interpreted By:  Jim Gomes, STUDY: XR CHEST 1 VIEW;  9/11/2024 3:34 pm   INDICATION: Signs/Symptoms:malaise.     COMPARISON: 9/9/2024   ACCESSION NUMBER(S): [id number]   ORDERING CLINICIAN: DILLNO KUNZ   FINDINGS: AP portable view of the chest is obtained.  Limited exam due to portable nature. Magnified cardiac silhouette. Moderately sized left-sided pleural effusion and infiltrate. This appears improved since the prior exam. Right lung is clear.       1. Improved left-sided pleural effusion and infiltrate with however moderate residual.       MACRO: None   Signed by: Jim Gomes 9/11/2024 4:12 PM Dictation workstation:   [id number]    XR humerus left    Result Date: 9/9/2024  Interpreted By:  Tamir Luevano, STUDY: XR HUMERUS LEFT; ;  9/9/2024 4:19 pm   INDICATION: Signs/Symptoms:injury/pain.   COMPARISON: None.   ACCESSION NUMBER(S): [id number]   ORDERING CLINICIAN: DILLON KUNZ   FINDINGS: No fractures or destructive lesions are identified. The joint spaces and articular surfaces of the left shoulder  and elbow are maintained. The alignment is anatomic. The soft tissues are unremarkable.       Normal radiographs of the left humerus.   Signed by: Tamir Luevano 9/9/2024 4:36 PM Dictation workstation:   [id number]    XR pelvis 1-2 views    Result Date: 9/9/2024  Interpreted By:  Tamir Luevano, STUDY: XR FEMUR LEFT 2+ VIEWS; XR PELVIS 1-2 VIEWS; ;  9/9/2024 4:19 pm   INDICATION: Signs/Symptoms:injury/pain; Signs/Symptoms:fall/injury.   COMPARISON: None.   ACCESSION NUMBER(S): [id number]; [id number]   ORDERING CLINICIAN: DILLON KUNZ   FINDINGS: Frontal view of the pelvis and additional four view radiographs of the left femur were obtained. There is no fracture or subluxation. Minimal degenerative changes of the hip joints and of the left knee joint are present, with small marginal osteophytes. Mild degenerative changes also involve the visualized lower lumbar spine. The alignment is anatomic. The soft tissues are unremarkable.       No acute fracture or subluxation.   Signed by: Tamir Luevano 9/9/2024 4:35 PM Dictation workstation:   [id number]    XR femur left 2+ views    Result Date: 9/9/2024  Interpreted By:  Tamir Luevano, STUDY: XR FEMUR LEFT 2+ VIEWS; XR PELVIS 1-2 VIEWS; ;  9/9/2024 4:19 pm   INDICATION: Signs/Symptoms:injury/pain; Signs/Symptoms:fall/injury.   COMPARISON: None.   ACCESSION NUMBER(S): [id number]; [id number]   ORDERING CLINICIAN: DILLON KUNZ   FINDINGS: Frontal view of the pelvis and additional four view radiographs of the left femur were obtained. There is no fracture or subluxation. Minimal degenerative changes of the hip joints and of the left knee joint are present, with small marginal osteophytes. Mild degenerative changes also involve the visualized lower lumbar spine. The alignment is anatomic. The soft tissues are unremarkable.       No acute fracture or subluxation.   Signed by: Tamir Luevano 9/9/2024 4:35 PM Dictation workstation:   [id number]    XR chest 2  views    Result Date: 9/9/2024  Interpreted By:  Tamir Luevano, STUDY: XR CHEST 2 VIEWS; 9/9/2024 4:19 pm   INDICATION: Signs/Symptoms:injury/ pain   COMPARISON: 09/03/2024.   ACCESSION NUMBER(S): [id number]   ORDERING CLINICIAN: DILLON KUNZ   TECHNIQUE: Number of films: Two-view radiographs of the chest were obtained.   FINDINGS: The heart and mediastinum are normal. There is worsening left pleural effusion with possible associated atelectatic changes. No pneumothorax is seen. Degenerative changes involve the spine.       Worsening left pleural effusion.   Signed by: Tamir Luevano 9/9/2024 4:32 PM Dictation workstation:   [id number]    ECG 12 Lead    Result Date: 9/5/2024  Normal sinus rhythm Low voltage QRS RSR' or QR pattern in V1 suggests right ventricular conduction delay Prolonged QT Abnormal ECG When compared with ECG of 08-JUN-2024 01:18, T wave amplitude has decreased in Anterior leads Confirmed by Victorino Ortiz ([id number]) on 9/5/2024 9:57:49 AM    Transthoracic Echo (TTE) Limited    Result Date: 9/4/2024           [place] [address]            Phone [phone] TRANSTHORACIC ECHOCARDIOGRAM REPORT Patient Name:     HARPER Ferguson Physician:   [id number] Milan Nicole DO Study Date:       9/4/2024             Ordering Provider:   [id number] ALYX CAMILO MRN/PID:          [id number]             Fellow: Accession#:       [id number]         Nurse: Date of           1970 / 54 years Sonographer:         Darnell Garnica RDCS Birth/Age: Gender:           M                    Additional Staff: Height:           162.56 cm            Admit Date: Weight:           71.67 kg             Admission Status:    Inpatient - Routine BSA / BMI:        1.77 m2 / 27.12      Department Location: [facility]                   kg/m2 Blood Pressure: 107  /78 mmHg Study Type:    TRANSTHORACIC ECHO (TTE) LIMITED Diagnosis/ICD: Shortness of breath-R06.02 Indication:    SOB CPT Codes:     Echo Limited-65797 Patient History: Smoker:            Former. Pertinent History: HTN, Hyperlipidemia, CVA and Dyspnea. Study Detail: The following Echo studies were performed: 2D. Technically               challenging study due to body habitus and patient lying in supine               position.  PHYSICIAN INTERPRETATION: Left Ventricle: Left ventricular ejection fraction is normal, by visual estimate at 65-70%. There are no regional wall motion abnormalities. The left ventricular cavity size is normal. Spectral Doppler shows a normal pattern of left ventricular diastolic filling. Left Atrium: The left atrium is normal in size. Right Ventricle: The right ventricle is normal in size. There is normal right ventricular global systolic function. Right Atrium: The right atrium is normal in size. Aortic Valve: The aortic valve is trileaflet. There is no evidence of aortic valve regurgitation. Mitral Valve: The mitral valve is normal in structure. Mitral valve regurgitation was not assessed. Tricuspid Valve: The tricuspid valve is structurally normal. Tricuspid regurgitation was not assessed. Pulmonic Valve: The pulmonic valve is not well visualized. The pulmonic valve regurgitation was not well visualized. Pericardium: There is no pericardial effusion noted. Aorta: The aortic root is normal.  CONCLUSIONS:  1. Left ventricular ejection fraction is normal, by visual estimate at 65-70%.  2. There is normal right ventricular global systolic function. QUANTITATIVE DATA SUMMARY: 2D MEASUREMENTS:                          Normal Ranges: IVSd:          0.92 cm   (0.6-1.1cm) LVPWd:         0.94 cm   (0.6-1.1cm) LVIDd:         3.58 cm   (3.9-5.9cm) LVIDs:         2.44 cm LV Mass Index: 54.0 g/m2 LV % FS        31.8 % LV SYSTOLIC FUNCTION BY 2D PLANIMETRY (MOD):                      Normal Ranges:  EF-A4C View:    74 % (>=55%) EF-A2C View:    60 % EF-Biplane:     70 % EF-Visual:      68 % LV EF Reported: 68 %  [id number] Milan Nicole DO Electronically signed on 9/4/2024 at 2:02:24 PM  ** Final **     US thoracentesis    Result Date: 9/4/2024  Interpreted By:  Virgilio Davila, STUDY: US THORACENTESIS; 9/3/2024 10:42 am   INDICATION: Left pleural effusion.   COMPARISON: None   ACCESSION NUMBER(S): [id number]   ORDERING CLINICIAN: ALYX CAMILO   TECHNIQUE: Informed consent obtained. Patient positionedsitting upright. Skin prepped, draped and anesthetized. Under ultrasound guidance, a centesis catheter/needle was advanced into leftpleural cavity.   FINDINGS: A total of 1.3 L of clear turner fluid was aspirated. The patient tolerated the procedure well.       Ultrasound-guided left thoracentesis.   Signed by: Virgilio Davila 9/4/2024 12:25 PM Dictation workstation:   [id number]    XR chest 1 view    Result Date: 9/3/2024  Interpreted By:  Oralia Jackman, STUDY: XR CHEST 1 VIEW 9/3/2024 2:31 pm   INDICATION: Status post thoracentesis   COMPARISON: 08/29/2024   ACCESSION NUMBER(S): [id number]   ORDERING CLINICIAN: TASIA SEVERINO   TECHNIQUE: AP semi-erect view of the chest at bedside   FINDINGS: Ultrasound-guided left thoracentesis has been performed with considerable decrease in size of the left pleural effusion. No pneumothorax is seen.       No pneumothorax following ultrasound-guided thoracentesis on the left. There is near complete evacuation of the left pleural effusion.   Signed by: Oralia Jackman 9/3/2024 4:09 PM Dictation workstation:   [id number]    Transthoracic Echo (TTE) Complete    Result Date: 9/3/2024           [place] [address]            Phone [phone] TRANSTHORACIC ECHOCARDIOGRAM REPORT Patient Name:     HARPER Ferguson Physician:   [id number] Milan Nicole DO Study Date:       9/3/2024              Ordering Provider:   [id number] TASIA SEVERINO MRN/PID:          [id number]             Fellow: Accession#:       [id number]         Nurse: Date of           1970 / 54 years Sonographer:         Carri Alcala Birth/Age: Gender:           M                    Additional Staff: Height:           162.56 cm            Admit Date: Weight:           69.85 kg             Admission Status:    Inpatient - Routine BSA / BMI:        1.75 m2 / 26.43      Department Location: [facility]                   kg/m2 Blood Pressure: 110 /59 mmHg Study Type:    TRANSTHORACIC ECHO (TTE) COMPLETE Diagnosis/ICD: Other secondary hypertension-I15.8 CPT Codes:     Echo Complete w Full Doppler-[id number]  Study Detail: The following Echo studies were performed: 2D, M-Mode, Doppler,               color flow, Strain and 3D.  PHYSICIAN INTERPRETATION: Left Ventricle: Left ventricular ejection fraction is normal, by visual estimate at 60-65%. There are no regional wall motion abnormalities. The left ventricular cavity size is normal. Left Ventricular Global Longitudinal Strain - -22.5 %. Spectral Doppler shows an impaired relaxation pattern of left ventricular diastolic filling. Left Atrium: The left atrium is normal in size. Right Ventricle: The right ventricle is normal in size. There is normal right ventricular global systolic function. Right Atrium: The right atrium is normal in size. Aortic Valve: The aortic valve is trileaflet. The aortic valve dimensionless index is 1.01. There is no evidence of aortic valve regurgitation. The peak instantaneous gradient of the aortic valve is 6.1 mmHg. The mean gradient of the aortic valve is 4.0 mmHg. Mitral Valve: The mitral valve is normal in structure. There is trace mitral valve regurgitation. Tricuspid Valve: The tricuspid valve is structurally normal. There is trace tricuspid regurgitation. Pulmonic Valve: The pulmonic valve is  not well visualized. The pulmonic valve regurgitation was not well visualized. Pericardium: There is no pericardial effusion noted. Aorta: The aortic root is normal.  CONCLUSIONS:  1. Left ventricular ejection fraction is normal, by visual estimate at 60-65%.  2. Spectral Doppler shows an impaired relaxation pattern of left ventricular diastolic filling.  3. There is normal right ventricular global systolic function. QUANTITATIVE DATA SUMMARY: 2D MEASUREMENTS:                          Normal Ranges: IVSd:          1.08 cm   (0.6-1.1cm) LVPWd:         0.74 cm   (0.6-1.1cm) LVIDd:         4.37 cm   (3.9-5.9cm) LVIDs:         2.67 cm LV Mass Index: 73.5 g/m2 LV % FS        38.9 % LA VOLUME:                              Normal Ranges: LA Volume Index:  38.3 ml/m2 LA Vol A4C:       63.4 ml LA Vol A2C:       70.6 ml LA Vol Index BSA: 38.3 ml/m2 RA VOLUME BY A/L METHOD:                       Normal Ranges: RA Area A4C: 10.1 cm2 AORTA MEASUREMENTS:                      Normal Ranges: Ao Sinus, d: 3.20 cm (2.1-3.5cm) Ao STJ, d:   2.09 cm (1.7-3.4cm) Asc Ao, d:   2.80 cm (2.1-3.4cm) LV SYSTOLIC FUNCTION BY 2D PLANIMETRY (MOD):                                         Normal Ranges: EF-A4C View:                      65 %  (>=55%) EF-A2C View:                      68 % EF-Biplane:                       67 % EF-Visual:                        63 % EF-Auto:                          66 % LV EF Reported:                   63 % Global Longitudinal Strain (GLS): -22 % LV DIASTOLIC FUNCTION:                         Normal Ranges: MV Peak E:    0.90 m/s  (0.7-1.2 m/s) MV Peak A:    0.61 m/s  (0.42-0.7 m/s) E/A Ratio:    1.48      (1.0-2.2) MV e'         0.101 m/s (>8.0) MV lateral e' 0.11 m/s MV medial e'  0.09 m/s E/e' Ratio:   8.93      (<8.0) MITRAL VALVE:                 Normal Ranges: MV DT: 220 msec (150-240msec) AORTIC VALVE:                                   Normal Ranges: AoV Vmax:                1.23 m/s (<=1.7m/s) AoV Peak PG:              6.1 mmHg (<20mmHg) AoV Mean P.0 mmHg (1.7-11.5mmHg) LVOT Max Vcikey:            1.23 m/s (<=1.1m/s) AoV VTI:                 28.20 cm (18-25cm) LVOT VTI:                28.50 cm LVOT Diameter:           2.00 cm  (1.8-2.4cm) AoV Area, VTI:           3.18 cm2 (2.5-5.5cm2) AoV Area,Vmax:           3.14 cm2 (2.5-4.5cm2) AoV Dimensionless Index: 1.01  RIGHT VENTRICLE: RV Basal 2.78 cm TAPSE:   29.1 mm RV s'    0.12 m/s  [id number] Milan Nicole DO Electronically signed on 9/3/2024 at 2:01:47 PM  ** Final **     US thoracentesis    Result Date: 9/3/2024  Interpreted By:  Virgilio Davila, STUDY: US THORACENTESIS; 2024 2:46 pm   INDICATION: Left pleural effusion   COMPARISON: None   ACCESSION NUMBER(S): [id number]   ORDERING CLINICIAN: VIRGILIO DAVILA   TECHNIQUE: Informed consent obtained. Patient positionedsitting upright. Skin prepped, draped and anesthetized. Under ultrasound guidance, a centesis catheter/needle was advanced into leftpleural cavity.   FINDINGS: A total of 1.7 L of clear yellow fluid was aspirated. A sample was sent for analysis. The patient tolerated the procedure well.       Ultrasound-guided left thoracentesis.   Signed by: Virgilio Davila 9/3/2024 10:00 AM Dictation workstation:   [id number]    US guided abdominal paracentesis    Result Date: 9/3/2024  Interpreted By:  Virgilio Davila, STUDY: US GUIDED ABDOMINAL PARACENTESIS; 2024 2:46 pm   INDICATION: Signs/Symptoms:ascites.   COMPARISON: None.   ACCESSION NUMBER(S): [id number]   ORDERING CLINICIAN: VIRGILIO DAVILA   TECHNIQUE: Ultrasound-guided paracentesis   FINDINGS: Informed consent obtained. Patient positioned supine. Right lower quadrant prepped, draped and anesthetized. Under ultrasound guidance, 8 [place] centesis sheath needle inserted into peritoneal cavity. 1.5 Lof clear yellowfluid aspirated. Patient tolerated procedure well       Ultrasound-guided paracentesis.   Signed by: Virgilio Davila 9/3/2024 9:59 AM  Dictation workstation:   [id number]    CT chest wo IV contrast    Result Date: 9/2/2024  Interpreted By:  Ai Gil, STUDY: CT CHEST WO IV CONTRAST;  9/2/2024 10:18 am   INDICATION: Signs/Symptoms:hypoxia.     COMPARISON: Chest x-ray 08/29/2024 and CT abdomen pelvis 08/29/2024   ACCESSION NUMBER(S): [id number]   ORDERING CLINICIAN: ALYX CAMILO   TECHNIQUE: Helical data acquisition of the chest was obtained  without IV contrast material.  Images were reformatted in axial, coronal, and sagittal planes. Images extend through the abdomen to the level of the iliac crest.   FINDINGS: LUNGS AND AIRWAYS: The trachea and central airways are patent. No endobronchial lesion.   Large left pleural effusion is only minimally decreased from the prior chest x-ray, with interval thoracentesis removing 1.7 L of fluid on 08/30/2024. Compressive atelectasis involves most of the left lower lobe. Some mild patchy ground-glass opacities are present in the left upper lobe.   There is minimal pleural fluid present on the right. A small peripheral infiltrate is present posterolateral aspect of the right middle lobe and there is some minimal patchy ground-glass opacity through the right lung.   MEDIASTINUM AND HANANE, LOWER NECK AND AXILLA: The visualized thyroid gland is within normal limits.   No evidence of thoracic lymphadenopathy by CT criteria.   Esophagus appears within normal limits as seen.   HEART AND VESSELS: The thoracic aorta is of normal course and caliber with mild patchy vascular calcifications.   Main pulmonary artery and its branches are normal in caliber.   Patchy coronary artery calcifications are seen. The study is not optimized for evaluation of coronary arteries.   The cardiac chambers are not enlarged.   No evidence of pericardial effusion.   ABDOMEN: Liver is unchanged in size and contour with prominence of the left lobe and heterogeneity. Pneumobilia is unchanged. Gallbladder is absent.   Patient is status  post Whipple procedure. The remaining portion of the pancreas is severely atrophic. There is no inflammation or gross mass.   Spleen is mildly enlarged measuring about 13 x 13 x 7 cm.   Adrenal glands appear normal.   No hydronephrosis, stone or gross mass is noted involving the kidneys.   Included bowel loops are not dilated. The large ventral hernia is partially excluded from field of view and appears grossly unchanged. Ascites is similar in volume to the prior study. There is no gross pneumoperitoneum noted.   Aorta shows no aneurysmal dilatation. Vena cava appears normal in size.   CHEST WALL AND OSSEOUS STRUCTURES: No acute or suspicious osseous abnormality is noted. There is mild bilateral gynecomastia.       1.  Large left pleural effusion with severe compressive atelectasis of the left lower lobe 2. Small patchy infiltrates in both lungs could represent pneumonia 3. Ascites is similar to the prior study. Once again there is a cirrhotic appearance of the liver and mild splenomegaly without change. With regard to the vague left hepatic lobe abnormality on the prior study, this is even more nondescript on the current exam. 4. Patient is status post Whipple procedure 5. Large midline ventral hernia containing bowel is incompletely included in the field of view but grossly unchanged   MACRO: None   Signed by: Ai Gil 9/2/2024 11:39 AM Dictation workstation:   [id number]    XR chest 1 view    Result Date: 8/29/2024  Interpreted By:  Holden Salinas, STUDY: XR CHEST 1 VIEW;  8/29/2024 6:04 pm   INDICATION: Signs/Symptoms:evaluate for pleural effusion seen on ct abd.   COMPARISON: Chest radiograph 06/08/2024   ACCESSION NUMBER(S): [id number]   ORDERING CLINICIAN: DILLON KUNZ   FINDINGS:     CARDIOMEDIASTINAL SILHOUETTE: Cardiomediastinal silhouette is stable in size and configuration.   LUNGS: Large left-sided pleural effusion with left lower lung airspace opacification.   ABDOMEN: No remarkable upper  abdominal findings.   BONES: No acute osseous changes.       1.  Large left-sided pleural effusion.     Signed by: Holden Salinas 8/29/2024 6:13 PM Dictation workstation:   [id number]    CT abdomen pelvis wo IV contrast    Result Date: 8/29/2024  Interpreted By:  Tamir Luevano, STUDY: CT ABDOMEN PELVIS WO IV CONTRAST; 8/29/2024 3:42 pm   INDICATION: Signs/Symptoms:abdominal pain, hx of ascites   COMPARISON: 06/05/2024.   ACCESSION NUMBER(S): [id number]   ORDERING CLINICIAN: SURINDER LAZO   TECHNIQUE: Spiral axial unenhanced images were obtained from the upper abdomen to the symphysis pubis. Oral contrast was not administered.   All CT examinations are performed with one or more of the following dose reduction techniques: Automated Exposure Control, adjustment of mA and/or kV according to patient size, or use of iterative reconstruction techniques.   FINDINGS: Lung bases: There is interval significant worsening of large left pleural effusion with left basilar atelectatic changes/infiltrates. Liver: Heterogenous cirrhotic liver with vague hypodensity again noted in the left lobe, similar to prior exam. Mild pneumobilia again seen. Associated worsening ascites in the abdomen and pelvis. Spleen: Splenomegaly again noted, without focal lesions. Pancreas: Previous Whipple's procedure, similar to prior exam. Associated prominent peripancreatic/periportal lymph nodes, similar to prior exam. Adrenal glands: No focal lesions. The kidneys are normal in size and position. There are no renal calculi or hydronephrosis. No abnormal calcifications are seen within the course of the ureters or within the partially distended bladder. Pelvic reproductive organs: Small calcifications again seen in the prostate gland.   Several colonic diverticula are again seen, without acute diverticulitis. There is again large ventral hernia containing several small bowel loops, without strangulation or bowel obstruction. Atherosclerotic  calcifications involve the aorta and its branches, without focal aneurysm. Degenerative changes involve the L5-S1 level of the lumbar spine.       1. Interval significant worsening of large left pleural effusion left basilar infiltrates/atelectasis; correlate clinically and follow-up as needed. 2. Heterogenous cirrhotic liver with pneumobilia and stable hypodense lesion in the left lobe. Splenomegaly. Worsening ascites in the abdomen and pelvis. 3.  previous Whipple's procedure. Associated prominent peripancreatic/periportal lymph nodes. 4. Large ventral hernia again seen, containing several small bowel loops, without strangulation or bowel obstruction. Colonic diverticulosis without acute diverticulitis.   Signed by: Tamir Luevano 8/29/2024 4:15 PM Dictation workstation:   [id number]       Assessment/Plan   Assessment & Plan  Malaise and fatigue  COVID positive   Xray of chest revealed improved left-sided pleural effusion and infiltrate with however  moderate residual. Echo completed on 09/04/2024 which was normal.   -Ordered Remdesivir  -Continuous telemetry  -Ordered continuous supplemental oxygenation PRN, >92%  -PT/OT to evaluate ad treat  -Consulted ID   Urinary tract infection  UA was positive for WBC and RBC  -Ordered Rocephin, per previous notes has anaphylactic allergy to zosyn but has tolerated meropenem, keflex,ceftriaxone. Monitor for adverse reactions.  Hypokalemia  -Ordered oral potassium replacement  -Monitor   Anemia  No signs and symptoms of bleeding  -HGB stable  -Monitor  Alcoholic cirrhosis of liver with ascites (Multi)  ALT 47    Stable  -Continue home medications, Lasix and Aldactone    -Low Na diet  Hypertension  -Continue home medication, Lopressor    Plan: Admit to RNF with telemetry. PT/OT to evaluate and treat. Appreciate recommendations from ID.     Jerrica Tse, KIMBERLEY-CNP

## 2024-09-12 NOTE — NURSING NOTE
Pt arrived to room 459 at this time. Oriented to room and call light. Pt stated he is hungry and hasn't eaten in days, box lunch given to pt

## 2024-09-12 NOTE — PROGRESS NOTES
Attempted to see patient earlier this date and nursing at bedside. Called patient's room at this time but no answer. Care Transitions will follow up at another time.

## 2024-09-12 NOTE — H&P
History Of Present Illness  Nils Hogan is a 54 y.o. male presenting with covid fever, anable to care for self due to weakness.     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    Surgical History  He has a past surgical history that includes Adenoidectomy; Whipple procedure (04/22/2019); and Urethra surgery.     Social History  He reports that he quit smoking about 6 weeks ago. His smoking use included cigarettes. He started smoking about 37 years ago. He has a 9.4 pack-year smoking history. He does not have any smokeless tobacco history on file. He reports current alcohol use. No history on file for drug use.    Family History  Family History   Problem Relation Name Age of Onset    Stroke Mother      Bipolar disorder Mother      Prostate cancer Father      Hypertension Sister      Hyperlipidemia Sister      Asthma Son      Colon cancer Paternal Grandfather          Allergies  Diazepam, Iodinated contrast media, Iodine, Zosyn [piperacillin-tazobactam], Acetaminophen, and Phenytoin sodium extended    Review of Systems   Constitutional:  Positive for activity change.   HENT:  Positive for congestion.    Eyes: Negative.    Respiratory:  Positive for cough.    Cardiovascular: Negative.    Gastrointestinal:  Positive for abdominal pain.   Endocrine: Negative.    Genitourinary: Negative.    Musculoskeletal: Negative.    Skin: Negative.    Allergic/Immunologic: Negative.    Neurological:  Positive for weakness.   Psychiatric/Behavioral: Negative.          Physical Exam  Constitutional:       Appearance: Normal appearance.   HENT:      Head: Normocephalic and atraumatic.      Nose: Nose normal.      Mouth/Throat:      Mouth: Mucous membranes are moist.   Eyes:      Extraocular Movements: Extraocular movements intact.       Conjunctiva/sclera: Conjunctivae normal.      Pupils: Pupils are equal, round, and reactive to light.   Cardiovascular:      Rate and Rhythm: Normal rate and regular rhythm.   Pulmonary:      Effort: Pulmonary effort is normal.   Abdominal:      General: There is distension.      Palpations: Abdomen is soft.      Comments: Abdominal wall hernia   Musculoskeletal:         General: Normal range of motion.      Cervical back: Normal range of motion and neck supple.      Comments: Weak legs   Skin:     General: Skin is warm.   Neurological:      General: No focal deficit present.      Mental Status: He is alert.   Psychiatric:         Mood and Affect: Mood normal.          Last Recorded Vitals  /71 (BP Location: Left arm, Patient Position: Lying)   Pulse 97   Temp 37.8 °C (100 °F) (Oral)   Resp 20   Wt 72.5 kg (159 lb 13.3 oz)   SpO2 92%     Relevant Results             Assessment/Plan   Assessment & Plan  Malaise and fatigue  COVID positive   Xray of chest revealed improved left-sided pleural effusion and infiltrate with however  moderate residual. Echo completed on 09/04/2024 which was normal.   -Ordered Remdesivir  -Continuous telemetry  -Ordered continuous supplemental oxygenation PRN, >92%  -PT/OT to evaluate ad treat  -Consulted ID   Urinary tract infection  UA was positive for WBC and RBC  -Ordered Rocephin, per previous notes has anaphylactic allergy to zosyn but has tolerated meropenem, keflex,ceftriaxone. Monitor for adverse reactions.  Hypokalemia  -Ordered oral potassium replacement  -Monitor   Anemia  No signs and symptoms of bleeding  -HGB stable  -Monitor  Alcoholic cirrhosis of liver with ascites (Multi)  ALT 47    Stable  -Continue home medications, Lasix and Aldactone    -Low Na diet  Hypertension  -Continue home medication, Lopressor    Fever covid abdominal wal hernia old       Barbara Brunson MD

## 2024-09-12 NOTE — ASSESSMENT & PLAN NOTE
COVID positive   Xray of chest revealed improved left-sided pleural effusion and infiltrate with however  moderate residual. Echo completed on 09/04/2024 which was normal.   -Ordered Remdesivir  -Continuous telemetry  -Ordered continuous supplemental oxygenation PRN, >92%  -PT/OT to evaluate ad treat  -Consulted ID

## 2024-09-12 NOTE — PROGRESS NOTES
09/12/24 1601   Discharge Planning   Living Arrangements Alone   Support Systems Family members;Friends/neighbors   Assistance Needed Patient reports he was independent of ADLs and IADLs, currently feeling weak due to illness.   Type of Residence Private residence   Number of Stairs to Enter Residence 0  (Wheelchair ramp)   Number of Stairs Within Residence 0   Do you have animals or pets at home? Yes   Type of Animals or Pets dog   Who is requesting discharge planning? Provider   Home or Post Acute Services In home services   Type of Home Care Services Home nursing visits;Home OT;Home PT   Expected Discharge Disposition HH Services   Does the patient need discharge transport arranged? No   Financial Resource Strain   How hard is it for you to pay for the very basics like food, housing, medical care, and heating? Not hard   Housing Stability   In the last 12 months, was there a time when you were not able to pay the mortgage or rent on time? N   In the past 12 months, how many times have you moved where you were living? 0   At any time in the past 12 months, were you homeless or living in a shelter (including now)? N   Transportation Needs   In the past 12 months, has lack of transportation kept you from medical appointments or from getting medications? no   In the past 12 months, has lack of transportation kept you from meetings, work, or from getting things needed for daily living? No   Patient Choice   Provider Choice list and CMS website (https://medicare.gov/care-compare#search) for post-acute Quality and Resource Measure Data were provided and reviewed with: Patient   Patient / Family choosing to utilize agency / facility established prior to hospitalization Yes     MSW spoke with patient. He reports he lives at home alone and was independent prior to admission. Reports he wants to return home and resume services with [facility], however, reports increased weakness at home. He feels this is due to illness  and that he will functionally do better once he is feeling better. Care Transitions will follow and assist with discharge planning; advised patient of the same and he is agreeable to follow up when nearing discharge.     Of note patient's neighbor/friend Barbara is caring for his dog while he is admitted.

## 2024-09-12 NOTE — NURSING NOTE
Paged dr. Brunson regarding pts temp of 38.6 , after no response via messaging, awaiting return call

## 2024-09-12 NOTE — ASSESSMENT & PLAN NOTE
UA was positive for WBC and RBC  -Ordered Rocephin, per previous notes has anaphylactic allergy to zosyn but has tolerated meropenem, keflex,ceftriaxone. Monitor for adverse reactions.

## 2024-09-12 NOTE — CONSULTS
"Nutrition Assessement Note    Nutrition Assessment    Reason for Assessment: Dietitian discretion (failure to thrive)    Attempted to reach pt via phone 2x, no answer. Pt came into Ed with concerns about dehydration w/ dark urine and feeling weak over the past few days. Pt tried to hydrate with ater and Gatorade. Will continue to monitor PO intakes.     Reason for Hospital Admission:  Nils Hogan is a 54 y.o. male who is admitted for malaise and fatigue.     Past Medical History:   Diagnosis Date    Bipolar I disorder (Multi)     CVA (cerebral vascular accident) (Multi)     Duodenal cancer (Multi) 03/2019    Epilepsy (Multi)     Hyperlipidemia     Hypertension     Tobacco use     Urethral stricture       Past Surgical History:   Procedure Laterality Date    ADENOIDECTOMY      URETHRA SURGERY      Urethral stricture repair    WHIPPLE PROCEDURE  04/22/2019    Diagnostic laparoscopy, open laparotomy for pylorus-preserving pancreaticoduodenectomy, pancreaticojejunostomy, hepaticojejunostomy, gastrojejunostomy and liver biopsy       Nutrition History:     Energy Intake: Poor < 50 %  Food Allergies/Intolerances:  None  GI Symptoms: None    Anthropometrics:  Ht: 172.7 cm (5' 7.99\"), Wt: 72.5 kg (159 lb 13.3 oz), BMI: 24.31             Weight Change:  Daily Weight  09/12/24 : 72.5 kg (159 lb 13.3 oz)  09/09/24 : 71.7 kg (158 lb)  09/04/24 : 71.8 kg (158 lb 4.6 oz)  06/08/24 : 71.2 kg (157 lb)  06/05/24 : 71.2 kg (157 lb)  05/29/24 : 73.5 kg (162 lb)     Weight History / % Weight Change: chart shows stable weight between 157-162# over 4 months             Nutrition Focused Physical Exam Findings:             Edema  Edema: +2 mild, ascites  Edema Location: BLE         Nutrition Significant Labs:  Lab Results   Component Value Date    WBC 3.6 (L) 09/12/2024    HGB 10.4 (L) 09/12/2024    HCT 29.9 (L) 09/12/2024    PLT 71 (L) 09/12/2024    ALT 29 09/12/2024     (H) 09/12/2024     (L) 09/12/2024    K 3.3 (L) " 09/12/2024    CL 98 09/12/2024    CREATININE 0.40 09/12/2024    BUN 5 (L) 09/12/2024    CO2 27 09/12/2024    INR 2.0 (H) 08/30/2024     Nutrition Specific Medications:  cefTRIAXone, 1 g, intravenous, q24h CHARISSA  dexAMETHasone, 6 mg, oral, Daily  furosemide, 40 mg, oral, BID  metoprolol tartrate, 25 mg, oral, BID  mirtazapine, 30 mg, oral, Nightly  pantoprazole, 40 mg, oral, Daily before breakfast  QUEtiapine, 50 mg, oral, Nightly  remdesivir, 100 mg, intravenous, q24h  spironolactone, 50 mg, oral, Daily        Dietary Orders (From admission, onward)       Start     Ordered    09/11/24 2213  Adult diet 2-3 grams sodium  Diet effective now        Question:  Diet type  Answer:  2-3 grams sodium    09/11/24 2213                  Estimated Needs:   Estimated Energy Needs  Total Energy Estimated Needs (kCal): 2175 kCal  Total Estimated Energy Need per Day (kCal/kg): 30 kCal/kg  Method for Estimating Needs: actual wt    Estimated Protein Needs  Total Protein Estimated Needs (g): 87 g  Total Protein Estimated Needs (g/kg): 1.2 g/kg  Method for Estimating Needs: actual wt    Estimated Fluid Needs  Method for Estimating Needs: < 2000 mL/d        Nutrition Diagnosis   Nutrition Diagnosis:       Nutrition Diagnosis  Patient has Nutrition Diagnosis: Yes  Diagnosis Status (1): New  Nutrition Diagnosis 1: Increased nutrient needs  Related to (1): increased demand for nutrient  As Evidenced by (1): cirrhosis       Nutrition Interventions/Recommendations   Nutrition Interventions and Recommendations:    Nutrition Prescription:  Individualized Nutrition Prescription Provided for : 2175 kcals, 87 g protein via diet    Nutrition Interventions:   Food and/or Nutrient Delivery Interventions  Interventions: Meals and snacks  Meals and Snacks: Mineral-modified diet  Goal: provide diet as ordered    Education Documentation  No documentation found.           Nutrition Monitoring and Evaluation   Monitoring/Evaluation:   Food/Nutrient Related  History Monitoring  Monitoring and Evaluation Plan: Energy intake  Energy Intake: Estimated energy intake  Criteria: pt to consume >/= 75% estimated needs         Time Spent/Follow-up:   Follow Up  Time Spent (min): 30 minutes  Last Date of Nutrition Visit: 09/12/24  Nutrition Follow-Up Needed?: 3-5 days  Follow up Comment: 9/17/24

## 2024-09-12 NOTE — NURSING NOTE
Message left for dr. Brunson regarding pts temp of 38.6 now and also pt now requiring o2@ 2 liters per nc , Awaiting return message

## 2024-09-12 NOTE — PROGRESS NOTES
Occupational Therapy    Evaluation    Patient Name: Nils Hogan  MRN: [id number]  Department: [facility]  Room: [address] [place] [address]-  Today's Date: 9/12/2024  Time Calculation  Start Time: 1302  Stop Time: 1318  Time Calculation (min): 16 min        Assessment:  OT Assessment: pt presents with generalized weakness, SOB, reduced endurance and decreased balance which impedes ADL performance. pt would benefit from skilled OT services to address these deficits and to facilitate highest level of independence.  Prognosis: Good  Barriers to Discharge: Decreased caregiver support  Evaluation/Treatment Tolerance: Patient limited by fatigue  Medical Staff Made Aware: Yes  End of Session Communication: Bedside nurse  End of Session Patient Position: Up in chair, Alarm on (call button in reach)  OT Assessment Results: Decreased ADL status, Decreased endurance, Decreased functional mobility  Prognosis: Good  Barriers to Discharge: Decreased caregiver support  Evaluation/Treatment Tolerance: Patient limited by fatigue  Medical Staff Made Aware: Yes  Strengths: Ability to acquire knowledge, Attitude of self  Barriers to Participation: Comorbidities  Plan:  Treatment Interventions: ADL retraining, Functional transfer training, UE strengthening/ROM, Endurance training, Equipment evaluation/education, Compensatory technique education  OT Frequency: 3 times per week  OT Discharge Recommendations: Moderate intensity level of continued care  Equipment Recommended upon Discharge: Wheeled walker, Bedside commode  OT Recommended Transfer Status: Assist of 2, Moderate assist  OT - OK to Discharge: Yes  Treatment Interventions: ADL retraining, Functional transfer training, UE strengthening/ROM, Endurance training, Equipment evaluation/education, Compensatory technique education    Subjective     General:  General  Reason for Referral: ADL impairment; pt is a 53 y/o male admitted with malaise and fatigue; pt found to have COVID; pt sustained a fall;  Past  Medical History Relevant to Rehab: hernia; HTN; acites; duodenal CA s/p Whipple surgery; [place]; liver cirrhosis; CVA with Left sided weakness; hepaticojejunostomy; anemia; UTI; ETOH  Family/Caregiver Present: No  Co-Treatment: PT  Co-Treatment Reason: physically and medically complex patient requiring 2 person assist for safety  Prior to Session Communication: Bedside nurse  Patient Position Received: Bed, 2 rail up, Alarm on  Preferred Learning Style: verbal, visual  General Comment: pt agreeable with therapy, productive cough noted with increased SOB with activity/talking.  pt with significant abdominal distention.  Precautions:  Medical Precautions: Fall precautions  Precautions Comment: + COVID isolation, previous CVA with mild left sided residual weakness    Pain:  Pain Assessment  Pain Assessment: 0-10  0-10 (Numeric) Pain Score: 5 - Moderate pain  Pain Type: Acute pain  Pain Location: Abdomen  Pain Interventions: Repositioned    Objective   Cognition:  Overall Cognitive Status: Within Functional Limits  Orientation Level: Oriented X4           Home Living:  Type of Home: Mobile home  Lives With: Alone  Home Adaptive Equipment:  (rollator)  Home Layout: One level  Home Access: Ramped entrance  Bathroom Shower/Tub: Tub/shower unit  Bathroom Toilet: Adaptive toilet seating  Bathroom Equipment: Shower chair with back  Bathroom Accessibility: first floor  Home Living Comments: aide x5 days a week for 2-3 hours at a time  Prior Function:  Level of [place]: Needs assistance with ADLs, Needs assistance with homemaking  Receives Help From: Neighbor (also has health Aide 5x/week for 2-3 hours a day)  ADL Assistance: Needs assistance (aides assists with bathing, patient independent with dressing, toileting and grooming)  Homemaking Assistance: Needs assistance (aide does IADLs, uses instacart)  Ambulatory Assistance: Independent (with rollator)  Prior Function Comments: pt reported managing well at home; pt uses  Dial- a-ride for transportation     ADL:  Eating Assistance: Independent  Grooming Assistance: Minimal (anticipated)  Bathing Assistance: Moderate (anticipated)  UE Dressing Assistance: Minimal (anticipated)  LE Dressing Assistance: Moderate (anticipated)  Toileting Assistance with Device: Moderate (anticipated)  ADL Comments: Scores based off clinical observation and performance demonstrated.  Activity Tolerance:  Endurance: Decreased tolerance for upright activites  Bed Mobility/Transfers: Bed Mobility  Bed Mobility: Yes  Bed Mobility 1  Bed Mobility 1: Supine to sitting  Level of Assistance 1: Moderate assistance  Bed Mobility Comments 1: pt was able to manage BLE to edge of bed with extended time, required MOD A for lifting trunk    Transfers  Transfer: Yes  Transfer 1  Technique 1: Sit to stand, Stand to sit  Transfer Device 1:  (rollator)  Transfer Level of Assistance 1: Moderate assistance, +2  Trials/Comments 1: MOD A x2 to ascend from edge of bed, cues for positioning and hand placement; effortful  Transfers 2  Transfer From 2: Bed to  Transfer to 2: Chair with arms  Technique 2: Stand pivot  Transfer Device 2:  (rollator)  Transfer Level of Assistance 2: Moderate assistance, +2  Trials/Comments 2: MOD A x2 for steady assist during pivot, pt requiring extended time to complete with difficulty advancing BLE.  heavy reliance on BUE for support in standing.    Sitting Balance:  Static Sitting Balance  Static Sitting-Balance Support: Feet supported, Bilateral upper extremity supported  Static Sitting-Level of Assistance: Close supervision  Standing Balance:  Static Standing Balance  Static Standing-Balance Support: Bilateral upper extremity supported  Static Standing-Level of Assistance: Moderate assistance (x1-2)      Vision:Vision - Basic Assessment  Current Vision: No visual deficits  Sensation:  Sensation Comment: paresthesia in distal BLE/feet  Strength:  Strength Comments: RUE 4/5, LUE 3/5 with mild  weakness from previous CVA     Coordination:  Movements are Fluid and Coordinated: No  Upper Body Coordination: Tremors, decreased rate/accuracy of movement   Hand Function:  Gross Grasp: Functional  Coordination: Functional  Extremities: RUE   RUE : Within Functional Limits and LUE   LUE: Within Functional Limits      Outcome Measures:[facility] Daily Activity  Putting on and taking off regular lower body clothing: A lot  Bathing (including washing, rinsing, drying): A lot  Putting on and taking off regular upper body clothing: A little  Toileting, which includes using toilet, bedpan or urinal: A lot  Taking care of personal grooming such as brushing teeth: A little  Eating Meals: A little  Daily Activity - Total Score: 15        Education Documentation  Body Mechanics, taught by Sharif Mcknight, OT at 9/12/2024  3:01 PM.  Learner: Patient  Readiness: Acceptance  Method: Explanation, Demonstration  Response: Verbalizes Understanding    ADL Training, taught by Sharif Mcknight OT at 9/12/2024  3:01 PM.  Learner: Patient  Readiness: Acceptance  Method: Explanation, Demonstration  Response: Verbalizes Understanding    Education Comments  No comments found.        OP EDUCATION:       Goals:  Encounter Problems       Encounter Problems (Active)       ADLs       Patient with complete upper body dressing with independent level of assistance donning and doffing all UE clothes while edge of bed  (Progressing)       Start:  09/12/24    Expected End:  10/12/24            Patient with complete lower body dressing with modified independent level of assistance donning and doffing all LE clothes  with PRN adaptive equipment while edge of bed  (Progressing)       Start:  09/12/24    Expected End:  10/12/24            Patient will complete daily grooming tasks with modified independent level of assistance and PRN adaptive equipment while standing. (Progressing)       Start:  09/12/24    Expected End:  10/12/24            Patient will  complete toileting including hygiene clothing management/hygiene with modified independent level of assistance and grab bars. (Progressing)       Start:  09/12/24    Expected End:  10/12/24               TRANSFERS       Patient will complete functional transfer to toilet with least restrictive device with modified independent level of assistance. (Progressing)       Start:  09/12/24    Expected End:  10/12/24

## 2024-09-12 NOTE — CARE PLAN
The patient's goals for the shift include      The clinical goals for the shift include feel better

## 2024-09-12 NOTE — NURSING NOTE
Spoke with dr. Brunson , she is aware of pts, temp 38.6, , she will be here to see patient in 10 mins

## 2024-09-12 NOTE — CONSULTS
Inpatient consult to Infectious Diseases  Consult performed by: KIMBERLEY Estrella-CNP  Consult ordered by: KIMBERLEY Encarnacion-CNP  Reason for consult: RYAN Duffy MD: Angela Bryant DO      History Of Present Illness  Nils Hogan is a 54 y.o. male presenting with generalized weakness, shortnessof breath and cough.  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  Onset was a few days prior to admission-constant and gradual with interval worsening.  He presented to the ED for further evaluation and management.  He tested positive for coronavirus.  Oxygen saturation is ranging from 92-93% on room air.  He was started on remdesivir.  He is afebrile. Denies chills.  Reports a cough that is occasionally productive. Denies chest pain,  reports some shortness of breath  Denies nausea, vomiting, diarrhea.     Past Medical History  He has a past medical history of Bipolar I disorder (Multi), CVA (cerebral vascular accident) (Multi), Duodenal cancer (Multi) (2019), Epilepsy (Multi), Hyperlipidemia, Hypertension, Tobacco use, and Urethral stricture.    Surgical History  He has a past surgical history that includes Adenoidectomy; Whipple procedure (2019); and Urethra surgery.     Social History     Occupational History    Not on file   Tobacco Use    Smoking status: Former     Current packs/day: 0.00     Average packs/day: 0.3 packs/day for 37.6 years (9.4 ttl pk-yrs)     Types: Cigarettes     Start date:      Quit date: 2024     Years since quittin.1    Smokeless tobacco: Not on file   Substance and Sexual Activity    Alcohol use: Yes     Comment: He reports that he drinks alcohol occasionally    Drug use: Not on file    Sexual activity: Not on file     Travel History   Travel since 22    No documented travel since 24        Service:  [employer] Years Served Period   [employer]       Comments:          Family  History  Family History   Problem Relation Name Age of Onset    Stroke Mother      Bipolar disorder Mother      Prostate cancer Father      Hypertension Sister      Hyperlipidemia Sister      Asthma Son      Colon cancer Paternal Grandfather       Allergies  Diazepam, Iodinated contrast media, Iodine, Zosyn [piperacillin-tazobactam], Acetaminophen, and Phenytoin sodium extended     Immunization History   Administered Date(s) Administered    Moderna SARS-CoV-2 Vaccination 2021, 06/15/2021     Medications  Home medications:  Medications Prior to Admission   Medication Sig Dispense Refill Last Dose    ascorbic acid (Vitamin C) 1,000 mg tablet Take 1 tablet (1,000 mg) by mouth once daily.       aspirin 81 mg EC tablet Take 1 tablet (81 mg) by mouth once daily as needed for mild pain (1 - 3).       [] cefuroxime (Ceftin) 500 mg tablet Take 1 tablet (500 mg) by mouth 2 times a day for 7 days. 14 tablet 0     cholecalciferol (D3-2000) 50 mcg (2,000 unit) capsule Take 1 capsule (50 mcg) by mouth once daily.       [] doxycycline (Vibra-Tabs) 100 mg tablet Take 1 tablet (100 mg) by mouth every 12 hours for 7 days. Take with a full glass of water and do not lie down for at least 30 minutes after. 14 tablet 0     doxycycline 100 mg in dextrose 5% 100 mL IV Infuse 100 mg at 100 mL/hr over 60 minutes into a venous catheter every 12 hours. 20 each 0     furosemide (Lasix) 40 mg tablet Take 1 tablet (40 mg) by mouth 2 times daily (morning and late afternoon). 60 tablet 0     mirtazapine (Remeron) 30 mg tablet Take 1 tablet (30 mg) by mouth once daily at bedtime. 30 tablet 0     pantoprazole (ProtoNix) 40 mg EC tablet Take 1 tablet (40 mg) by mouth once daily in the morning. Take before meals.       QUEtiapine (SEROquel) 100 mg tablet Take 0.5 tablets (50 mg) by mouth once daily at bedtime.       spironolactone (Aldactone) 50 mg tablet Take 1 tablet (50 mg) by mouth once daily. 30 tablet 0      Current  "medications:  Scheduled medications  cefTRIAXone, 1 g, intravenous, q24h CHARISSA  dexAMETHasone, 6 mg, oral, Daily  furosemide, 40 mg, oral, BID  metoprolol tartrate, 25 mg, oral, BID  mirtazapine, 30 mg, oral, Nightly  pantoprazole, 40 mg, oral, Daily before breakfast  QUEtiapine, 50 mg, oral, Nightly  remdesivir, 100 mg, intravenous, q24h  spironolactone, 50 mg, oral, Daily      Continuous medications     PRN medications  PRN medications: morphine, ondansetron **OR** ondansetron, oxygen, polyethylene glycol    Review of Systems   Constitutional:  Positive for fatigue. Negative for chills and fever.   Respiratory:  Positive for cough and shortness of breath. Negative for wheezing.    Cardiovascular:  Negative for chest pain and leg swelling.   Gastrointestinal:  Positive for abdominal distention. Negative for abdominal pain, diarrhea, nausea and vomiting.   Skin:  Negative for wound.   Neurological:  Positive for weakness.   All other systems reviewed and are negative.       Objective  Range of Vitals (last 24 hours)  Heart Rate:  []   Temp:  [36.6 °C (97.9 °F)-37.3 °C (99.1 °F)]   Resp:  [15-23]   BP: (102-144)/(62-81)   Height:  [172.7 cm (5' 7.99\")-172.7 cm (5' 8\")]   Weight:  [71.7 kg (158 lb)-72.5 kg (159 lb 13.3 oz)]   SpO2:  [92 %-97 %]   Daily Weight  09/12/24 : 72.5 kg (159 lb 13.3 oz)    Body mass index is 24.31 kg/m².     Physical Exam  Constitutional:       Appearance: Normal appearance.   HENT:      Head: Normocephalic.   Eyes:      Extraocular Movements: Extraocular movements intact.      Conjunctiva/sclera: Conjunctivae normal.   Cardiovascular:      Rate and Rhythm: Normal rate and regular rhythm.   Pulmonary:      Effort: Pulmonary effort is normal.      Comments: Diminished bilaterally  Abdominal:      General: Bowel sounds are normal. There is distension.      Hernia: A hernia is present.   Musculoskeletal:         General: Normal range of motion.      Cervical back: Normal range of motion. " "  Skin:     General: Skin is warm and dry.   Neurological:      General: No focal deficit present.      Mental Status: He is alert and oriented to person, place, and time.   Psychiatric:         Mood and Affect: Mood normal.         Behavior: Behavior normal.          Relevant Results    Labs  Results from last 72 hours   Lab Units 09/12/24  0507 09/11/24  1554   WBC AUTO x10*3/uL 3.6* 4.9   HEMOGLOBIN g/dL 10.4* 13.2*   HEMATOCRIT % 29.9* 37.3*   PLATELETS AUTO x10*3/uL 71* 105*   LYMPHO PCT MAN %  --  15.0   MONO PCT MAN %  --  3.0   EOSINO PCT MAN %  --  0.0     Results from last 72 hours   Lab Units 09/12/24  0507 09/11/24  1554   SODIUM mmol/L 132* 129*   POTASSIUM mmol/L 3.3* 3.2*   CHLORIDE mmol/L 98 90*   CO2 mmol/L 27 29   BUN mg/dL 5* 5*   CREATININE mg/dL 0.40 0.50   GLUCOSE mg/dL 119* 117*   CALCIUM mg/dL 6.8* 8.2*   ANION GAP mmol/L 7 10   EGFR mL/min/1.73m*2 >90 >90     Results from last 72 hours   Lab Units 09/12/24  0507 09/11/24  1554   ALK PHOS U/L 123 183*   BILIRUBIN TOTAL mg/dL 4.8* 7.4*   PROTEIN TOTAL g/dL 5.6* 8.2*   ALT U/L 29 47*   AST U/L 173* 321*   ALBUMIN g/dL 1.8* 2.4*     Estimated Creatinine Clearance: 125 mL/min (by C-G formula based on SCr of 0.4 mg/dL).  CRP   Date Value Ref Range Status   03/27/2019 1.6 0 - 2.0 MG/DL Final     Comment:     Performed at [place] [id number]     No results found for: \"HIV1X2\", \"HIVCONF\", \"SWTIAN3HA\"  Hepatitis C AB   Date Value Ref Range Status   05/31/2024 Nonreactive Nonreactive Final     Comment:     Results from patients taking biotin supplements or receiving high-dose biotin therapy should be interpreted with caution due to possible interference with this test. Providers may contact their local laboratory for further information.     Microbiology  Urine culture pending  Imaging  ECG 12 lead    Result Date: 9/12/2024  Normal sinus rhythm Normal ECG    US gallbladder    Result Date: 9/11/2024  Interpreted By:  Erich, " Quentin, STUDY: US GALLBLADDER;  9/11/2024 9:20 pm   INDICATION: Signs/Symptoms:Transaminitis, hyperbilirubinemia, abdominal pain/malaise.     COMPARISON: None.   ACCESSION NUMBER(S): [id number]   ORDERING CLINICIAN: DILLON KUNZ   TECHNIQUE: Multiple sonographic grayscale and color images of the right upper quadrant were performed.   FINDINGS: The examination is limited secondary to shadowing from overlying bowel gas.   There is heterogeneous echotexture of the liver and nodular contour. The patient is status post Whipple surgery with cholecystectomy and hepaticojejunostomy. Nonspecific cystic structure in the region of the left hepatic lobe. There is abdominal ascites.   No hydronephrosis right kidney.       Limited examination secondary to shadowing from overlying bowel gas.   Liver cirrhosis and abdominal ascites.   Postsurgical change of Whipple surgery with cholecystectomy and hepaticojejunostomy. Please note evaluation is limited on the ultrasound examination secondary to the shadowing and described postsurgical change, and if there is persistent concern for underlying pathology, follow-up contrast enhanced CT is recommended for further evaluation.   MACRO: None   Signed by: Quentin Jung 9/11/2024 9:43 PM Dictation workstation:   [id number]    CT abdomen pelvis wo IV contrast    Result Date: 9/11/2024  Interpreted By:  Ronaldo Link, STUDY: CT ABDOMEN PELVIS WO IV CONTRAST;  9/11/2024 5:57 pm   INDICATION: Signs/Symptoms:r/o incarceration of hernia.   COMPARISON: CT abdomen and pelvis 08/29/2024   ACCESSION NUMBER(S): [id number]   ORDERING CLINICIAN: DILLON KUNZ   TECHNIQUE: Axial CT of the abdomen and pelvis was performed without intravenous contrast with coronal and sagittal reconstructions.   FINDINGS: Lower chest: Grossly stable large left pleural effusion with adjacent atelectasis.   Liver: Cirrhosis. Stable 1.6 cm anterior right hepatic hypodense lesion (series 4, image 40).   Biliary: Stable trace  pneumobilia related to hepaticojejunostomy. Cholecystectomy.   Spleen: No mass. Stable splenomegaly measuring 14 cm craniocaudally..   Pancreas: Status post Whipple. Limited evaluation of the presumed remnant pancreas which appears atrophic. No main duct dilation.   Adrenals: No mass.   Kidneys: No calculus or hydronephrosis.   GI tract: Status post Whipple. Apparent asymmetric rectal wall thickening measuring up to 1.9 cm, new since 08/29/2024. No bowel wall thickening of the remaining colon or small bowel. A few loops of mildly dilated small bowel within midline ventral hernia, not significantly changed. Colonic diverticulosis without acute inflammation.   Lymph nodes: Multiple prominent peripancreatic/periportal and mesenteric lymph nodes, likely reactive.   Mesentery/peritoneum: Stable moderate-size simple attenuation ascites. No free air or fluid collection.   Vasculature: Mild-to-moderate abdominal aortic atherosclerotic calcifications without aneurysmal dilation. Stable perihepatic, perisplenic and paraesophageal varices.   Pelvis: Stable moderate-size simple attenuation ascites. No free air or fluid collection. Stable moderate smooth circumferential bladder wall thickening with a posterolateral diverticulum, likely related to chronic urinary outlet obstruction. Enlarged prostate measuring 4.4 cm in transverse dimension.   Bones/Soft tissues: Redemonstration of a large midline supraumbilical ventral hernia with a 10 cm hernia neck. There are multiple loops of normal caliber and mildly dilated small bowel loops and small amount of free fluid within the hernia sac, similar to prior CT. There is an additional small right lateral paraumbilical hernia with a 2 cm neck containing fluid. Tiny fat containing bilateral inguinal hernias. Grossly unchanged lower lumbar degenerative disc disease.       No definite evidence of acute ventral hernia incarceration. Grossly similar appearance of midline large wide-neck bowel  containing supraumbilical and small right lateral narrow-neck fluid containing paraumbilical hernias.   Stable nonemergent findings as described.   MACRO: None   Signed by: Ronaldo Link 9/11/2024 6:35 PM Dictation workstation:   [id number]    XR chest 1 view    Result Date: 9/11/2024  Interpreted By:  Jim Gomes, STUDY: XR CHEST 1 VIEW;  9/11/2024 3:34 pm   INDICATION: Signs/Symptoms:malaise.     COMPARISON: 9/9/2024   ACCESSION NUMBER(S): [id number]   ORDERING CLINICIAN: DILLON KUNZ   FINDINGS: AP portable view of the chest is obtained.  Limited exam due to portable nature. Magnified cardiac silhouette. Moderately sized left-sided pleural effusion and infiltrate. This appears improved since the prior exam. Right lung is clear.       1. Improved left-sided pleural effusion and infiltrate with however moderate residual.       MACRO: None   Signed by: Jim Gomes 9/11/2024 4:12 PM Dictation workstation:   [id number]     Assessment/Plan   Hypoxia-oxygen saturation <94%, on room air currently  Coronavirus infection  History of cirrhosis with ascites  Allergy to zosyn-tolerating ceftriaxone    Continue ceftriaxone for now-monitor for adverse effects  Continue remdesivir  Begin dexamethasone-oxygen saturation <94%  Follow-up urine culture  Monitor WBC and temperature  Monitor  liver enzymes  Monitor oxygen saturation  Supportive care  Further recommendations based on pending work-up    Discussed with Dr Schroeder    Total time spent caring for the patient today was 35 minutes.  This includes time spent before the visit reviewing the chart, time spent during the visit, and time spent after the visit on documentation.          Zulma Reyez, APRN-CNP

## 2024-09-13 ENCOUNTER — APPOINTMENT (OUTPATIENT)
Dept: CARDIOLOGY | Facility: HOSPITAL | Age: 54
DRG: 177 | End: 2024-09-13
Payer: COMMERCIAL

## 2024-09-13 LAB
ALBUMIN SERPL-MCNC: 1.9 G/DL (ref 3.5–5)
ALP BLD-CCNC: 137 U/L (ref 35–125)
ALT SERPL-CCNC: 29 U/L (ref 5–40)
ANION GAP SERPL CALC-SCNC: 8 MMOL/L
AST SERPL-CCNC: 141 U/L (ref 5–40)
BACTERIA UR CULT: NORMAL
BILIRUB SERPL-MCNC: 4.1 MG/DL (ref 0.1–1.2)
BUN SERPL-MCNC: 6 MG/DL (ref 8–25)
CALCIUM SERPL-MCNC: 7.4 MG/DL (ref 8.5–10.4)
CHLORIDE SERPL-SCNC: 99 MMOL/L (ref 97–107)
CO2 SERPL-SCNC: 27 MMOL/L (ref 24–31)
CREAT SERPL-MCNC: 0.4 MG/DL (ref 0.4–1.6)
EGFRCR SERPLBLD CKD-EPI 2021: >90 ML/MIN/1.73M*2
ERYTHROCYTE [DISTWIDTH] IN BLOOD BY AUTOMATED COUNT: 15.8 % (ref 11.5–14.5)
GLUCOSE SERPL-MCNC: 142 MG/DL (ref 65–99)
HCT VFR BLD AUTO: 35.1 % (ref 41–52)
HGB BLD-MCNC: 12.1 G/DL (ref 13.5–17.5)
MCH RBC QN AUTO: 35.5 PG (ref 26–34)
MCHC RBC AUTO-ENTMCNC: 34.5 G/DL (ref 32–36)
MCV RBC AUTO: 103 FL (ref 80–100)
NRBC BLD-RTO: 0 /100 WBCS (ref 0–0)
PLATELET # BLD AUTO: 68 X10*3/UL (ref 150–450)
POTASSIUM SERPL-SCNC: 3.4 MMOL/L (ref 3.4–5.1)
PROT SERPL-MCNC: 6.1 G/DL (ref 5.9–7.9)
RBC # BLD AUTO: 3.41 X10*6/UL (ref 4.5–5.9)
SODIUM SERPL-SCNC: 134 MMOL/L (ref 133–145)
WBC # BLD AUTO: 2.8 X10*3/UL (ref 4.4–11.3)

## 2024-09-13 PROCEDURE — 1200000002 HC GENERAL ROOM WITH TELEMETRY DAILY

## 2024-09-13 PROCEDURE — 2500000002 HC RX 250 W HCPCS SELF ADMINISTERED DRUGS (ALT 637 FOR MEDICARE OP, ALT 636 FOR OP/ED): Performed by: NURSE PRACTITIONER

## 2024-09-13 PROCEDURE — 36415 COLL VENOUS BLD VENIPUNCTURE: CPT | Performed by: NURSE PRACTITIONER

## 2024-09-13 PROCEDURE — 2500000004 HC RX 250 GENERAL PHARMACY W/ HCPCS (ALT 636 FOR OP/ED): Performed by: NURSE PRACTITIONER

## 2024-09-13 PROCEDURE — 99221 1ST HOSP IP/OBS SF/LOW 40: CPT | Performed by: NURSE PRACTITIONER

## 2024-09-13 PROCEDURE — 93005 ELECTROCARDIOGRAM TRACING: CPT

## 2024-09-13 PROCEDURE — 80053 COMPREHEN METABOLIC PANEL: CPT | Performed by: NURSE PRACTITIONER

## 2024-09-13 PROCEDURE — 85027 COMPLETE CBC AUTOMATED: CPT | Performed by: NURSE PRACTITIONER

## 2024-09-13 PROCEDURE — 2500000001 HC RX 250 WO HCPCS SELF ADMINISTERED DRUGS (ALT 637 FOR MEDICARE OP): Performed by: NURSE PRACTITIONER

## 2024-09-13 ASSESSMENT — PAIN - FUNCTIONAL ASSESSMENT
PAIN_FUNCTIONAL_ASSESSMENT: 0-10

## 2024-09-13 ASSESSMENT — COGNITIVE AND FUNCTIONAL STATUS - GENERAL
DRESSING REGULAR UPPER BODY CLOTHING: A LITTLE
MOVING TO AND FROM BED TO CHAIR: A LITTLE
HELP NEEDED FOR BATHING: A LOT
TURNING FROM BACK TO SIDE WHILE IN FLAT BAD: A LITTLE
DAILY ACTIVITIY SCORE: 17
DRESSING REGULAR LOWER BODY CLOTHING: A LOT
PERSONAL GROOMING: A LITTLE
CLIMB 3 TO 5 STEPS WITH RAILING: A LOT
DAILY ACTIVITIY SCORE: 17
DRESSING REGULAR LOWER BODY CLOTHING: A LOT
HELP NEEDED FOR BATHING: A LOT
PERSONAL GROOMING: A LITTLE
MOBILITY SCORE: 16
TURNING FROM BACK TO SIDE WHILE IN FLAT BAD: A LITTLE
CLIMB 3 TO 5 STEPS WITH RAILING: A LOT
MOVING TO AND FROM BED TO CHAIR: A LITTLE
WALKING IN HOSPITAL ROOM: A LOT
DRESSING REGULAR UPPER BODY CLOTHING: A LITTLE
TOILETING: A LITTLE
TOILETING: A LITTLE
STANDING UP FROM CHAIR USING ARMS: A LITTLE
STANDING UP FROM CHAIR USING ARMS: A LITTLE
WALKING IN HOSPITAL ROOM: A LOT
MOBILITY SCORE: 16
MOVING FROM LYING ON BACK TO SITTING ON SIDE OF FLAT BED WITH BEDRAILS: A LITTLE
MOVING FROM LYING ON BACK TO SITTING ON SIDE OF FLAT BED WITH BEDRAILS: A LITTLE

## 2024-09-13 ASSESSMENT — ENCOUNTER SYMPTOMS
ENDOCRINE NEGATIVE: 1
CONSTITUTIONAL NEGATIVE: 1
EYES NEGATIVE: 1
RESPIRATORY NEGATIVE: 1
ALLERGIC/IMMUNOLOGIC NEGATIVE: 1
HEMATOLOGIC/LYMPHATIC NEGATIVE: 1
GASTROINTESTINAL NEGATIVE: 1
CARDIOVASCULAR NEGATIVE: 1
MUSCULOSKELETAL NEGATIVE: 1
PSYCHIATRIC NEGATIVE: 1

## 2024-09-13 ASSESSMENT — PAIN SCALES - GENERAL
PAINLEVEL_OUTOF10: 0 - NO PAIN

## 2024-09-13 NOTE — CARE PLAN
The patient's goals for the shift include      The clinical goals for the shift include wean supplemental oxygen    Over the shift, the patient did not make progress toward the following goals.     Problem: Pain - Adult  Goal: Verbalizes/displays adequate comfort level or baseline comfort level  Outcome: Progressing     Problem: Safety - Adult  Goal: Free from fall injury  Outcome: Progressing     Problem: Discharge Planning  Goal: Discharge to home or other facility with appropriate resources  Outcome: Progressing     Problem: Chronic Conditions and Co-morbidities  Goal: Patient's chronic conditions and co-morbidity symptoms are monitored and maintained or improved  Outcome: Progressing     Problem: Fall/Injury  Goal: Not fall by end of shift  Outcome: Progressing  Goal: Be free from injury by end of the shift  Outcome: Progressing  Goal: Verbalize understanding of personal risk factors for fall in the hospital  Outcome: Progressing  Goal: Verbalize understanding of risk factor reduction measures to prevent injury from fall in the home  Outcome: Progressing  Goal: Use assistive devices by end of the shift  Outcome: Progressing  Goal: Pace activities to prevent fatigue by end of the shift  Outcome: Progressing     Problem: Skin  Goal: Decreased wound size/increased tissue granulation at next dressing change  Outcome: Progressing  Goal: Participates in plan/prevention/treatment measures  Outcome: Progressing  Goal: Prevent/manage excess moisture  Outcome: Progressing  Goal: Prevent/minimize sheer/friction injuries  Outcome: Progressing  Goal: Promote/optimize nutrition  Outcome: Progressing  Goal: Promote skin healing  Outcome: Progressing     Problem: Pain  Goal: Takes deep breaths with improved pain control throughout the shift  Outcome: Progressing  Goal: Turns in bed with improved pain control throughout the shift  Outcome: Progressing  Goal: Walks with improved pain control throughout the shift  Outcome:  Progressing  Goal: Performs ADL's with improved pain control throughout shift  Outcome: Progressing  Goal: Participates in PT with improved pain control throughout the shift  Outcome: Progressing  Goal: Free from opioid side effects throughout the shift  Outcome: Progressing  Goal: Free from acute confusion related to pain meds throughout the shift  Outcome: Progressing     Problem: Nutrition  Goal: Oral intake greater 75%  Outcome: Progressing

## 2024-09-13 NOTE — CONSULTS
Assessment/Plan   After recovery from Covid. Recommended Tertiary Center eval and consideration for incisional hernia repair in setting of  Hx of Whipple at [facility]. Previously seen by Dr. Dawkins. Optimization of comorbid conditions including cirrhosis and ascites. No urgent surgical intervention warranted currently at this [facility].    Inpatient consult to Acute Care Surgery  Consult performed by: KIMBERLEY Wall-CNP  Consult ordered by: Barbara Brunson MD  Reason for consult: ventral hernia  Assessment/Recommendations: Pt is very well known to us from multiple previous admissions. General surgery again consulted for large incisional hernia. On imaging he is not obstructed. No abdominal pain. The ulceration that he had over his hernia is now healed.     Pt with hx of Whipple procedure at [facility]. On multiple previous admissions he was recommended to [facility] for his hernia repair due to his comorbid status. He currently has COVID, and notably cirrhosis and ascites. He will not undergo surgical repair of hernia while he is still coughing during COVID recovery. He will not be a candidate for surgery at this [facility]. No urgent surgical intervention is warranted. We recommend [facility] outpatient referral for optimization of his comorbid conditions and considerations for surgery.         Subjective     Admitted 9/11/24 with fever and malaise. Tested COVID pos. ID following and treating with Remdesivir.     He has a chronic incisional hernia that is stable. No obstructions.   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 Underwent Whipple procedure with Dr. Jamel Dawkins at [facility] in approx 5+ years ago.     Weakness, Gen        Review of Systems  Review of Systems   Constitutional: Negative.    HENT: Negative.     Eyes: Negative.    Respiratory: Negative.     Cardiovascular: Negative.    Gastrointestinal: Negative.    Endocrine: Negative.    Genitourinary: Negative.     Musculoskeletal: Negative.    Skin: Negative.    Allergic/Immunologic: Negative.    Hematological: Negative.    Psychiatric/Behavioral: Negative.         Objective     Vital signs for last 24 hours:  Temp:  [36.3 °C (97.3 °F)-38.6 °C (101.5 °F)] 36.3 °C (97.3 °F)  Heart Rate:  [74] 74  Resp:  [18-20] 18  BP: ()/(70-71) 90/71    Intake/Output this shift:  I/O this shift:  In: 720 [P.O.:720]  Out: 975 [Urine:975]    Physical Exam  Physical Exam  Constitutional:       Appearance: Normal appearance.   HENT:      Head: Normocephalic and atraumatic.      Right Ear: Tympanic membrane normal.      Nose: Nose normal.      Mouth/Throat:      Mouth: Mucous membranes are moist.   Eyes:      Pupils: Pupils are equal, round, and reactive to light.   Cardiovascular:      Rate and Rhythm: Normal rate and regular rhythm.      Pulses: Normal pulses.   Pulmonary:      Effort: Pulmonary effort is normal.      Breath sounds: Normal breath sounds.   Abdominal:      General: Bowel sounds are normal. There is no distension.      Tenderness: There is no abdominal tenderness. There is no guarding.      Hernia: A hernia is present.      Comments: Large incisional hernia present. No wound overlying hernia. Non reducible. Non tender.    Genitourinary:     Rectum: Normal.   Musculoskeletal:         General: Normal range of motion.   Skin:     General: Skin is warm and dry.   Neurological:      General: No focal deficit present.      Mental Status: He is alert.   Psychiatric:         Mood and Affect: Mood normal.         Behavior: Behavior normal.         Labs  CBC:   Lab Results   Component Value Date    WBC 2.8 (L) 09/13/2024    RBC 3.41 (L) 09/13/2024     BMP:   Lab Results   Component Value Date    GLUCOSE 142 (H) 09/13/2024    CO2 27 09/13/2024    BUN 6 (L) 09/13/2024    CREATININE 0.40 09/13/2024    CALCIUM 7.4 (L) 09/13/2024

## 2024-09-13 NOTE — PROGRESS NOTES
Nils Hogan is a 54 y.o. male on day 2 of admission presenting with Malaise and fatigue.      Subjective  Patient sitting up in bed  Eager for breakfast  Endorses shortness of breath and cough  On 2 L of oxygen  Febrile at 38.6 at 1410 on 9/12.  36.3 this morning  He denied feeling feverish yesterday  Endorses abdominal tightness secondary to fluid    Objective        Last Recorded Vitals      9/12/2024    12:32 PM 9/12/2024     4:10 PM 9/12/2024     5:15 PM 9/12/2024     9:40 PM 9/13/2024    12:05 AM 9/13/2024     5:47 AM 9/13/2024     8:05 AM   Vitals   Systolic       106   Diastolic       70   Heart Rate       74   Temp 37.3 °C (99.1 °F) 38.6 °C (101.5 °F) 37.8 °C (100 °F) 36.5 °C (97.7 °F) 36.4 °C (97.5 °F) 36.4 °C (97.5 °F) 36.3 °C (97.3 °F)   Resp 20 20  20 18 20 20       Imaging  ECG 12 lead    Result Date: 9/12/2024  Normal sinus rhythm Normal ECG    US gallbladder    Result Date: 9/11/2024  Interpreted By:  Quentin Jung, STUDY: US GALLBLADDER;  9/11/2024 9:20 pm   INDICATION: Signs/Symptoms:Transaminitis, hyperbilirubinemia, abdominal pain/malaise.     COMPARISON: None.   ACCESSION NUMBER(S): [id number]   ORDERING CLINICIAN: DILLON KUNZ   TECHNIQUE: Multiple sonographic grayscale and color images of the right upper quadrant were performed.   FINDINGS: The examination is limited secondary to shadowing from overlying bowel gas.   There is heterogeneous echotexture of the liver and nodular contour. The patient is status post Whipple surgery with cholecystectomy and hepaticojejunostomy. Nonspecific cystic structure in the region of the left hepatic lobe. There is abdominal ascites.   No hydronephrosis right kidney.       Limited examination secondary to shadowing from overlying bowel gas.   Liver cirrhosis and abdominal ascites.   Postsurgical change of Whipple surgery with cholecystectomy and hepaticojejunostomy. Please note evaluation is limited on the ultrasound examination secondary to the shadowing  and described postsurgical change, and if there is persistent concern for underlying pathology, follow-up contrast enhanced CT is recommended for further evaluation.   MACRO: None   Signed by: Quentin Jung 9/11/2024 9:43 PM Dictation workstation:   [id number]    CT abdomen pelvis wo IV contrast    Result Date: 9/11/2024  Interpreted By:  Ronaldo Link, STUDY: CT ABDOMEN PELVIS WO IV CONTRAST;  9/11/2024 5:57 pm   INDICATION: Signs/Symptoms:r/o incarceration of hernia.   COMPARISON: CT abdomen and pelvis 08/29/2024   ACCESSION NUMBER(S): [id number]   ORDERING CLINICIAN: DILLON KUNZ   TECHNIQUE: Axial CT of the abdomen and pelvis was performed without intravenous contrast with coronal and sagittal reconstructions.   FINDINGS: Lower chest: Grossly stable large left pleural effusion with adjacent atelectasis.   Liver: Cirrhosis. Stable 1.6 cm anterior right hepatic hypodense lesion (series 4, image 40).   Biliary: Stable trace pneumobilia related to hepaticojejunostomy. Cholecystectomy.   Spleen: No mass. Stable splenomegaly measuring 14 cm craniocaudally..   Pancreas: Status post Whipple. Limited evaluation of the presumed remnant pancreas which appears atrophic. No main duct dilation.   Adrenals: No mass.   Kidneys: No calculus or hydronephrosis.   GI tract: Status post Whipple. Apparent asymmetric rectal wall thickening measuring up to 1.9 cm, new since 08/29/2024. No bowel wall thickening of the remaining colon or small bowel. A few loops of mildly dilated small bowel within midline ventral hernia, not significantly changed. Colonic diverticulosis without acute inflammation.   Lymph nodes: Multiple prominent peripancreatic/periportal and mesenteric lymph nodes, likely reactive.   Mesentery/peritoneum: Stable moderate-size simple attenuation ascites. No free air or fluid collection.   Vasculature: Mild-to-moderate abdominal aortic atherosclerotic calcifications without aneurysmal dilation. Stable perihepatic,  perisplenic and paraesophageal varices.   Pelvis: Stable moderate-size simple attenuation ascites. No free air or fluid collection. Stable moderate smooth circumferential bladder wall thickening with a posterolateral diverticulum, likely related to chronic urinary outlet obstruction. Enlarged prostate measuring 4.4 cm in transverse dimension.   Bones/Soft tissues: Redemonstration of a large midline supraumbilical ventral hernia with a 10 cm hernia neck. There are multiple loops of normal caliber and mildly dilated small bowel loops and small amount of free fluid within the hernia sac, similar to prior CT. There is an additional small right lateral paraumbilical hernia with a 2 cm neck containing fluid. Tiny fat containing bilateral inguinal hernias. Grossly unchanged lower lumbar degenerative disc disease.       No definite evidence of acute ventral hernia incarceration. Grossly similar appearance of midline large wide-neck bowel containing supraumbilical and small right lateral narrow-neck fluid containing paraumbilical hernias.   Stable nonemergent findings as described.   MACRO: None   Signed by: Ronaldo Link 9/11/2024 6:35 PM Dictation workstation:   [id number]    XR chest 1 view    Result Date: 9/11/2024  Interpreted By:  Jim Gomes, STUDY: XR CHEST 1 VIEW;  9/11/2024 3:34 pm   INDICATION: Signs/Symptoms:malaise.     COMPARISON: 9/9/2024   ACCESSION NUMBER(S): [id number]   ORDERING CLINICIAN: DILLON KUNZ   FINDINGS: AP portable view of the chest is obtained.  Limited exam due to portable nature. Magnified cardiac silhouette. Moderately sized left-sided pleural effusion and infiltrate. This appears improved since the prior exam. Right lung is clear.       1. Improved left-sided pleural effusion and infiltrate with however moderate residual.       MACRO: None   Signed by: Jim Gomes 9/11/2024 4:12 PM Dictation workstation:   [id number]       Relevant Results  Results for orders placed or performed  during the hospital encounter of 09/11/24 (from the past 24 hour(s))   CBC   Result Value Ref Range    WBC 2.8 (L) 4.4 - 11.3 x10*3/uL    nRBC 0.0 0.0 - 0.0 /100 WBCs    RBC 3.41 (L) 4.50 - 5.90 x10*6/uL    Hemoglobin 12.1 (L) 13.5 - 17.5 g/dL    Hematocrit 35.1 (L) 41.0 - 52.0 %     (H) 80 - 100 fL    MCH 35.5 (H) 26.0 - 34.0 pg    MCHC 34.5 32.0 - 36.0 g/dL    RDW 15.8 (H) 11.5 - 14.5 %    Platelets 68 (L) 150 - 450 x10*3/uL   Comprehensive metabolic panel   Result Value Ref Range    Glucose 142 (H) 65 - 99 mg/dL    Sodium 134 133 - 145 mmol/L    Potassium 3.4 3.4 - 5.1 mmol/L    Chloride 99 97 - 107 mmol/L    Bicarbonate 27 24 - 31 mmol/L    Urea Nitrogen 6 (L) 8 - 25 mg/dL    Creatinine 0.40 0.40 - 1.60 mg/dL    eGFR >90 >60 mL/min/1.73m*2    Calcium 7.4 (L) 8.5 - 10.4 mg/dL    Albumin 1.9 (L) 3.5 - 5.0 g/dL    Alkaline Phosphatase 137 (H) 35 - 125 U/L    Total Protein 6.1 5.9 - 7.9 g/dL     (H) 5 - 40 U/L    Bilirubin, Total 4.1 (H) 0.1 - 1.2 mg/dL    ALT 29 5 - 40 U/L    Anion Gap 8 <=19 mmol/L       Physical Exam  Constitutional:       Appearance: Normal appearance.   HENT:      Head: Normocephalic.   Eyes:      Extraocular Movements: Extraocular movements intact.      Conjunctiva/sclera: Conjunctivae normal.   Cardiovascular:      Rate and Rhythm: Normal rate and regular rhythm.   Pulmonary:      Effort: Pulmonary effort is normal.      Comments: Diminished bilaterally  Abdominal:      General: Bowel sounds are normal. There is distension.      Hernia: A hernia is present.   Musculoskeletal:         General: Normal range of motion.      Cervical back: Normal range of motion.   Skin:     General: Skin is warm and dry.   Neurological:      General: No focal deficit present.      Mental Status: He is alert and oriented to person, place, and time.   Psychiatric:         Mood and Affect: Mood normal.         Behavior: Behavior normal.     No results found for the last 90  days.    Assessment/Plan  Hypoxia-oxygen saturation <94%, on room air currently  Coronavirus infection  History of cirrhosis with ascites -history of paracentesis  Allergy to zosyn-tolerating ceftriaxone     Continue ceftriaxone - tolerating  Continue remdesivir  Continue dexamethasone  Follow-up urine culture, in progress  Monitor WBC and temperature  Monitor  liver enzymes  Monitor oxygen saturation  Supportive care  Further recommendations based on pending work-up       Total time spent caring for the patient today was 25 minutes.  This includes time spent before the visit reviewing the chart, time spent during the visit, and time spent after the visit on documentation.

## 2024-09-13 NOTE — PROGRESS NOTES
Nils Hogan is a 54 y.o. male on day 2 of admission presenting with Malaise and fatigue.      Subjective   Better today, no fever, less pain, for home going when off the remdesevir, refussing rehab       Objective     Last Recorded Vitals  BP 90/71 (BP Location: Left arm, Patient Position: Lying)   Pulse 74   Temp 36.3 °C (97.3 °F) (Temporal)   Resp 18   Wt 72.5 kg (159 lb 13.3 oz)   SpO2 92%   Intake/Output last 3 Shifts:    Intake/Output Summary (Last 24 hours) at 9/13/2024 1357  Last data filed at 9/13/2024 1329  Gross per 24 hour   Intake 800 ml   Output 1275 ml   Net -475 ml       Admission Weight  Weight: 71.7 kg (158 lb) (09/11/24 1424)    Daily Weight  09/12/24 : 72.5 kg (159 lb 13.3 oz)    Image Results  ECG 12 lead  Normal sinus rhythm  Normal ECG      Physical Exam    Relevant Results               Assessment/Plan                  Assessment & Plan  Malaise and fatigue  COVID positive   Xray of chest revealed improved left-sided pleural effusion and infiltrate with however  moderate residual. Echo completed on 09/04/2024 which was normal.   -Ordered Remdesivir  -Continuous telemetry  -Ordered continuous supplemental oxygenation PRN, >92%  -PT/OT to evaluate ad treat  -Consulted ID   Urinary tract infection  UA was positive for WBC and RBC  -Ordered Rocephin, per previous notes has anaphylactic allergy to zosyn but has tolerated meropenem, keflex,ceftriaxone. Monitor for adverse reactions.  Hypokalemia  -Ordered oral potassium replacement  -Monitor   Anemia  No signs and symptoms of bleeding  -HGB stable  -Monitor  Alcoholic cirrhosis of liver with ascites (Multi)  ALT 47    Stable  -Continue home medications, Lasix and Aldactone    -Low Na diet  Hypertension  -Continue home medication, Lopressor  Home after remdesevir              Barbara Brunson MD

## 2024-09-13 NOTE — CARE PLAN
The patient's goals for the shift include      The clinical goals for the shift include wean supplemental oxygen    Over the shift, the patient did not make progress toward the following goals. Barriers to progression include unable to wean supplemental oxygen. Recommendations to address these barriers include continue to monitor pulse ox, encourage cough and deep breathing and incentive spirometer use when awake.

## 2024-09-13 NOTE — NURSING NOTE
Assumed care for patient . Patient noted lying in bed . Patient denies pain and distress . Urinal emptied . Call light noted within reach .

## 2024-09-14 ENCOUNTER — APPOINTMENT (OUTPATIENT)
Dept: RADIOLOGY | Facility: HOSPITAL | Age: 54
DRG: 177 | End: 2024-09-14
Payer: COMMERCIAL

## 2024-09-14 LAB
ALBUMIN SERPL-MCNC: 2.1 G/DL (ref 3.5–5)
ALP BLD-CCNC: 168 U/L (ref 35–125)
ALT SERPL-CCNC: 29 U/L (ref 5–40)
ANION GAP SERPL CALC-SCNC: 9 MMOL/L
AST SERPL-CCNC: 105 U/L (ref 5–40)
BILIRUB SERPL-MCNC: 3.8 MG/DL (ref 0.1–1.2)
BUN SERPL-MCNC: 7 MG/DL (ref 8–25)
CALCIUM SERPL-MCNC: 7.8 MG/DL (ref 8.5–10.4)
CHLORIDE SERPL-SCNC: 101 MMOL/L (ref 97–107)
CO2 SERPL-SCNC: 29 MMOL/L (ref 24–31)
CREAT SERPL-MCNC: 0.4 MG/DL (ref 0.4–1.6)
EGFRCR SERPLBLD CKD-EPI 2021: >90 ML/MIN/1.73M*2
ERYTHROCYTE [DISTWIDTH] IN BLOOD BY AUTOMATED COUNT: 15.4 % (ref 11.5–14.5)
GLUCOSE SERPL-MCNC: 119 MG/DL (ref 65–99)
HCT VFR BLD AUTO: 36 % (ref 41–52)
HGB BLD-MCNC: 12.8 G/DL (ref 13.5–17.5)
MCH RBC QN AUTO: 35 PG (ref 26–34)
MCHC RBC AUTO-ENTMCNC: 35.6 G/DL (ref 32–36)
MCV RBC AUTO: 98 FL (ref 80–100)
NRBC BLD-RTO: 0 /100 WBCS (ref 0–0)
PLATELET # BLD AUTO: 90 X10*3/UL (ref 150–450)
POTASSIUM SERPL-SCNC: 3.1 MMOL/L (ref 3.4–5.1)
PROT SERPL-MCNC: 6.7 G/DL (ref 5.9–7.9)
RBC # BLD AUTO: 3.66 X10*6/UL (ref 4.5–5.9)
SODIUM SERPL-SCNC: 139 MMOL/L (ref 133–145)
WBC # BLD AUTO: 7.6 X10*3/UL (ref 4.4–11.3)

## 2024-09-14 PROCEDURE — 84075 ASSAY ALKALINE PHOSPHATASE: CPT | Performed by: NURSE PRACTITIONER

## 2024-09-14 PROCEDURE — 71046 X-RAY EXAM CHEST 2 VIEWS: CPT

## 2024-09-14 PROCEDURE — 2500000004 HC RX 250 GENERAL PHARMACY W/ HCPCS (ALT 636 FOR OP/ED): Performed by: NURSE PRACTITIONER

## 2024-09-14 PROCEDURE — 2500000002 HC RX 250 W HCPCS SELF ADMINISTERED DRUGS (ALT 637 FOR MEDICARE OP, ALT 636 FOR OP/ED): Performed by: NURSE PRACTITIONER

## 2024-09-14 PROCEDURE — 71046 X-RAY EXAM CHEST 2 VIEWS: CPT | Performed by: RADIOLOGY

## 2024-09-14 PROCEDURE — 1200000002 HC GENERAL ROOM WITH TELEMETRY DAILY

## 2024-09-14 PROCEDURE — 2500000001 HC RX 250 WO HCPCS SELF ADMINISTERED DRUGS (ALT 637 FOR MEDICARE OP): Performed by: NURSE PRACTITIONER

## 2024-09-14 PROCEDURE — 36415 COLL VENOUS BLD VENIPUNCTURE: CPT | Performed by: NURSE PRACTITIONER

## 2024-09-14 PROCEDURE — 85027 COMPLETE CBC AUTOMATED: CPT | Performed by: NURSE PRACTITIONER

## 2024-09-14 ASSESSMENT — COGNITIVE AND FUNCTIONAL STATUS - GENERAL
STANDING UP FROM CHAIR USING ARMS: A LITTLE
MOVING FROM LYING ON BACK TO SITTING ON SIDE OF FLAT BED WITH BEDRAILS: A LITTLE
TURNING FROM BACK TO SIDE WHILE IN FLAT BAD: A LITTLE
DRESSING REGULAR LOWER BODY CLOTHING: A LOT
CLIMB 3 TO 5 STEPS WITH RAILING: A LOT
WALKING IN HOSPITAL ROOM: A LOT
PERSONAL GROOMING: A LITTLE
DAILY ACTIVITIY SCORE: 17
MOBILITY SCORE: 16
TOILETING: A LITTLE
DRESSING REGULAR UPPER BODY CLOTHING: A LITTLE
HELP NEEDED FOR BATHING: A LOT
MOVING TO AND FROM BED TO CHAIR: A LITTLE

## 2024-09-14 ASSESSMENT — PAIN - FUNCTIONAL ASSESSMENT
PAIN_FUNCTIONAL_ASSESSMENT: 0-10
PAIN_FUNCTIONAL_ASSESSMENT: 0-10

## 2024-09-14 ASSESSMENT — PAIN SCALES - GENERAL
PAINLEVEL_OUTOF10: 0 - NO PAIN
PAINLEVEL_OUTOF10: 0 - NO PAIN

## 2024-09-14 NOTE — PROGRESS NOTES
Nils Hogan is a 54 y.o. male on day 3 of admission presenting with Malaise and fatigue.      Subjective   Better today wants o2 at home       Objective     Last Recorded Vitals  /66 (BP Location: Left arm, Patient Position: Lying)   Pulse 69   Temp 36 °C (96.8 °F) (Temporal)   Resp 20   Wt 72.5 kg (159 lb 13.3 oz)   SpO2 92%   Intake/Output last 3 Shifts:    Intake/Output Summary (Last 24 hours) at 9/14/2024 1916  Last data filed at 9/14/2024 1610  Gross per 24 hour   Intake 800 ml   Output 1880 ml   Net -1080 ml       Admission Weight  Weight: 71.7 kg (158 lb) (09/11/24 1424)    Daily Weight  09/12/24 : 72.5 kg (159 lb 13.3 oz)    Image Results  XR chest 2 views  Narrative: Interpreted By:  Tamir Luevano,   STUDY:  XR CHEST 2 VIEWS; 9/14/2024 7:54 am      INDICATION:  Signs/Symptoms:infiltrates      COMPARISON:  09/11/2024.      ACCESSION NUMBER(S):  [id number]      ORDERING CLINICIAN:  ALYX CAMILO      TECHNIQUE:  Number of films: Two-view radiographs of the chest were obtained.      FINDINGS:  The heart and mediastinum are normal.  There is left pleural effusion and left basilar  infiltrates/atelectatic changes; allowing for differences in  technique there is worsening since the prior exam. The right lung is  clear. No pneumothorax is seen.  The osseous structures are unremarkable.      Impression: Worsening left effusion and left basilar infiltrates/atelectatic  changes; correlate clinically and follow-up as needed.      Signed by: Tamir Luevano 9/14/2024 1:01 PM  Dictation workstation:   [id number]      Physical Exam    Relevant Results               Assessment/Plan                  Assessment & Plan  Malaise and fatigue  COVID positive   Xray of chest revealed improved left-sided pleural effusion and infiltrate with however  moderate residual. Echo completed on 09/04/2024 which was normal.   -Ordered Remdesivir  -Continuous telemetry  -Ordered continuous supplemental oxygenation PRN,  >92%  -PT/OT to evaluate ad treat  -Consulted ID   Urinary tract infection  UA was positive for WBC and RBC  -Ordered Rocephin, per previous notes has anaphylactic allergy to zosyn but has tolerated meropenem, keflex,ceftriaxone. Monitor for adverse reactions.  Hypokalemia  -Ordered oral potassium replacement  -Monitor   Anemia  No signs and symptoms of bleeding  -HGB stable  -Monitor  Alcoholic cirrhosis of liver with ascites (Multi)  ALT 47    Stable  -Continue home medications, Lasix and Aldactone    -Low Na diet  Hypertension  -Continue home medication, Lopressor  Better, refuses rehab              Barbara Brunson MD

## 2024-09-14 NOTE — CARE PLAN
The patient's goals for the shift include      The clinical goals for the shift include no SOB    Over the shift, the patient did not make progress toward the following goals. Barriers to progression include pt with OLIVARES and SOB when talking. Recommendations to address these barriers include supplemental oxygen as needed, encourage cough and deep breathing.

## 2024-09-14 NOTE — CARE PLAN
The patient's goals for the shift include      The clinical goals for the shift include no SOB    Over the shift, the patient did not make progress toward the following goals.       Problem: Pain - Adult  Goal: Verbalizes/displays adequate comfort level or baseline comfort level  Outcome: Progressing     Problem: Safety - Adult  Goal: Free from fall injury  Outcome: Progressing     Problem: Discharge Planning  Goal: Discharge to home or other facility with appropriate resources  Outcome: Progressing     Problem: Chronic Conditions and Co-morbidities  Goal: Patient's chronic conditions and co-morbidity symptoms are monitored and maintained or improved  Outcome: Progressing     Problem: Fall/Injury  Goal: Not fall by end of shift  Outcome: Progressing  Goal: Be free from injury by end of the shift  Outcome: Progressing  Goal: Verbalize understanding of personal risk factors for fall in the hospital  Outcome: Progressing  Goal: Verbalize understanding of risk factor reduction measures to prevent injury from fall in the home  Outcome: Progressing  Goal: Use assistive devices by end of the shift  Outcome: Progressing  Goal: Pace activities to prevent fatigue by end of the shift  Outcome: Progressing     Problem: Skin  Goal: Decreased wound size/increased tissue granulation at next dressing change  Outcome: Progressing  Goal: Participates in plan/prevention/treatment measures  Outcome: Progressing  Goal: Prevent/manage excess moisture  Outcome: Progressing  Goal: Prevent/minimize sheer/friction injuries  Outcome: Progressing  Goal: Promote/optimize nutrition  Outcome: Progressing  Goal: Promote skin healing  Outcome: Progressing     Problem: Pain  Goal: Takes deep breaths with improved pain control throughout the shift  Outcome: Progressing  Goal: Turns in bed with improved pain control throughout the shift  Outcome: Progressing  Goal: Walks with improved pain control throughout the shift  Outcome: Progressing  Goal:  Performs ADL's with improved pain control throughout shift  Outcome: Progressing  Goal: Participates in PT with improved pain control throughout the shift  Outcome: Progressing  Goal: Free from opioid side effects throughout the shift  Outcome: Progressing  Goal: Free from acute confusion related to pain meds throughout the shift  Outcome: Progressing     Problem: Nutrition  Goal: Oral intake greater 75%  Outcome: Progressing

## 2024-09-15 VITALS
WEIGHT: 159.83 LBS | SYSTOLIC BLOOD PRESSURE: 93 MMHG | HEIGHT: 68 IN | HEART RATE: 68 BPM | BODY MASS INDEX: 24.22 KG/M2 | TEMPERATURE: 96.8 F | DIASTOLIC BLOOD PRESSURE: 56 MMHG | OXYGEN SATURATION: 94 % | RESPIRATION RATE: 18 BRPM

## 2024-09-15 LAB
ATRIAL RATE: 94 BPM
P AXIS: 62 DEGREES
P OFFSET: 208 MS
P ONSET: 158 MS
PR INTERVAL: 126 MS
Q ONSET: 221 MS
QRS COUNT: 15 BEATS
QRS DURATION: 86 MS
QT INTERVAL: 382 MS
QTC CALCULATION(BAZETT): 477 MS
QTC FREDERICIA: 443 MS
R AXIS: 67 DEGREES
T AXIS: 38 DEGREES
T OFFSET: 412 MS
VENTRICULAR RATE: 94 BPM

## 2024-09-15 PROCEDURE — 9420000001 HC RT PATIENT EDUCATION 5 MIN

## 2024-09-15 PROCEDURE — 2500000004 HC RX 250 GENERAL PHARMACY W/ HCPCS (ALT 636 FOR OP/ED): Performed by: NURSE PRACTITIONER

## 2024-09-15 PROCEDURE — 1200000002 HC GENERAL ROOM WITH TELEMETRY DAILY

## 2024-09-15 PROCEDURE — 2500000001 HC RX 250 WO HCPCS SELF ADMINISTERED DRUGS (ALT 637 FOR MEDICARE OP): Performed by: NURSE PRACTITIONER

## 2024-09-15 PROCEDURE — 94640 AIRWAY INHALATION TREATMENT: CPT

## 2024-09-15 PROCEDURE — 94664 DEMO&/EVAL PT USE INHALER: CPT

## 2024-09-15 PROCEDURE — 2500000002 HC RX 250 W HCPCS SELF ADMINISTERED DRUGS (ALT 637 FOR MEDICARE OP, ALT 636 FOR OP/ED): Performed by: INTERNAL MEDICINE

## 2024-09-15 RX ORDER — IPRATROPIUM BROMIDE AND ALBUTEROL SULFATE 2.5; .5 MG/3ML; MG/3ML
3 SOLUTION RESPIRATORY (INHALATION)
Status: DISCONTINUED | OUTPATIENT
Start: 2024-09-15 | End: 2024-09-17 | Stop reason: HOSPADM

## 2024-09-15 ASSESSMENT — COGNITIVE AND FUNCTIONAL STATUS - GENERAL
MOVING TO AND FROM BED TO CHAIR: A LITTLE
DRESSING REGULAR LOWER BODY CLOTHING: A LOT
MOBILITY SCORE: 16
DRESSING REGULAR UPPER BODY CLOTHING: A LITTLE
DRESSING REGULAR UPPER BODY CLOTHING: A LITTLE
TURNING FROM BACK TO SIDE WHILE IN FLAT BAD: A LITTLE
STANDING UP FROM CHAIR USING ARMS: A LITTLE
DRESSING REGULAR LOWER BODY CLOTHING: A LOT
HELP NEEDED FOR BATHING: A LOT
MOVING FROM LYING ON BACK TO SITTING ON SIDE OF FLAT BED WITH BEDRAILS: A LITTLE
STANDING UP FROM CHAIR USING ARMS: A LITTLE
MOVING FROM LYING ON BACK TO SITTING ON SIDE OF FLAT BED WITH BEDRAILS: A LITTLE
TURNING FROM BACK TO SIDE WHILE IN FLAT BAD: A LITTLE
DAILY ACTIVITIY SCORE: 17
CLIMB 3 TO 5 STEPS WITH RAILING: A LOT
PERSONAL GROOMING: A LITTLE
TOILETING: A LITTLE
CLIMB 3 TO 5 STEPS WITH RAILING: A LOT
MOVING TO AND FROM BED TO CHAIR: A LITTLE
DAILY ACTIVITIY SCORE: 17
MOBILITY SCORE: 16
HELP NEEDED FOR BATHING: A LOT
WALKING IN HOSPITAL ROOM: A LOT
PERSONAL GROOMING: A LITTLE
TOILETING: A LITTLE
WALKING IN HOSPITAL ROOM: A LOT

## 2024-09-15 ASSESSMENT — PAIN SCALES - GENERAL
PAINLEVEL_OUTOF10: 0 - NO PAIN
PAINLEVEL_OUTOF10: 0 - NO PAIN

## 2024-09-15 ASSESSMENT — PAIN - FUNCTIONAL ASSESSMENT: PAIN_FUNCTIONAL_ASSESSMENT: 0-10

## 2024-09-15 NOTE — PROGRESS NOTES
Nils Hogan is a 54 y.o. male on day 3 of admission presenting with Malaise and fatigue.    Subjective   Interval History:   Afebrile  No antibiotics limit exposure  On low-flow oxygen  Observed through glass window, resting comfortably        Review of Systems    Objective   Range of Vitals (last 24 hours)  Heart Rate:  [69]   Temp:  [36 °C (96.8 °F)-36.5 °C (97.7 °F)]   Resp:  [18-22]   BP: (108-116)/(66-80)   SpO2:  [92 %-93 %]   Daily Weight  09/12/24 : 72.5 kg (159 lb 13.3 oz)    Body mass index is 24.31 kg/m².    Physical Exam  Awake, alert  Resting comfortably    Antibiotics  cefTRIAXone - 1 gram/50 mL  DOXYCYCLINE  MG  ML D5W (ADV/MBP)    Relevant Results  Labs  Results from last 72 hours   Lab Units 09/14/24 0557 09/13/24  0523 09/12/24  0507   WBC AUTO x10*3/uL 7.6 2.8* 3.6*   HEMOGLOBIN g/dL 12.8* 12.1* 10.4*   HEMATOCRIT % 36.0* 35.1* 29.9*   PLATELETS AUTO x10*3/uL 90* 68* 71*     Results from last 72 hours   Lab Units 09/14/24 0557 09/13/24  0523 09/12/24  0507   SODIUM mmol/L 139 134 132*   POTASSIUM mmol/L 3.1* 3.4 3.3*   CHLORIDE mmol/L 101 99 98   CO2 mmol/L 29 27 27   BUN mg/dL 7* 6* 5*   CREATININE mg/dL 0.40 0.40 0.40   GLUCOSE mg/dL 119* 142* 119*   CALCIUM mg/dL 7.8* 7.4* 6.8*   ANION GAP mmol/L 9 8 7   EGFR mL/min/1.73m*2 >90 >90 >90     Results from last 72 hours   Lab Units 09/14/24 0557 09/13/24  0523 09/12/24  0507   ALK PHOS U/L 168* 137* 123   BILIRUBIN TOTAL mg/dL 3.8* 4.1* 4.8*   PROTEIN TOTAL g/dL 6.7 6.1 5.6*   ALT U/L 29 29 29   AST U/L 105* 141* 173*   ALBUMIN g/dL 2.1* 1.9* 1.8*     Estimated Creatinine Clearance: 125 mL/min (by C-G formula based on SCr of 0.4 mg/dL).  CRP   Date Value Ref Range Status   03/27/2019 1.6 0 - 2.0 MG/DL Final     Comment:     Performed at [facility] [id number]     Microbiology  Reviewed  Imaging  XR chest 2 views    Result Date: 9/14/2024  Interpreted By:  Tamir Luevano, STUDY: XR CHEST 2 VIEWS; 9/14/2024  7:54 am   INDICATION: Signs/Symptoms:infiltrates   COMPARISON: 09/11/2024.   ACCESSION NUMBER(S): [id number]   ORDERING CLINICIAN: ALYX CAMILO   TECHNIQUE: Number of films: Two-view radiographs of the chest were obtained.   FINDINGS: The heart and mediastinum are normal. There is left pleural effusion and left basilar infiltrates/atelectatic changes; allowing for differences in technique there is worsening since the prior exam. The right lung is clear. No pneumothorax is seen. The osseous structures are unremarkable.       Worsening left effusion and left basilar infiltrates/atelectatic changes; correlate clinically and follow-up as needed.   Signed by: Tamir Luevano 9/14/2024 1:01 PM Dictation workstation:   [id number]    ECG 12 lead    Result Date: 9/12/2024  Normal sinus rhythm Normal ECG    US gallbladder    Result Date: 9/11/2024  Interpreted By:  Quentin Jung, STUDY: US GALLBLADDER;  9/11/2024 9:20 pm   INDICATION: Signs/Symptoms:Transaminitis, hyperbilirubinemia, abdominal pain/malaise.     COMPARISON: None.   ACCESSION NUMBER(S): [id number]   ORDERING CLINICIAN: DILLON KUNZ   TECHNIQUE: Multiple sonographic grayscale and color images of the right upper quadrant were performed.   FINDINGS: The examination is limited secondary to shadowing from overlying bowel gas.   There is heterogeneous echotexture of the liver and nodular contour. The patient is status post Whipple surgery with cholecystectomy and hepaticojejunostomy. Nonspecific cystic structure in the region of the left hepatic lobe. There is abdominal ascites.   No hydronephrosis right kidney.       Limited examination secondary to shadowing from overlying bowel gas.   Liver cirrhosis and abdominal ascites.   Postsurgical change of Whipple surgery with cholecystectomy and hepaticojejunostomy. Please note evaluation is limited on the ultrasound examination secondary to the shadowing and described postsurgical change, and if there is persistent  concern for underlying pathology, follow-up contrast enhanced CT is recommended for further evaluation.   MACRO: None   Signed by: Quentin Jung 9/11/2024 9:43 PM Dictation workstation:   [id number]    CT abdomen pelvis wo IV contrast    Result Date: 9/11/2024  Interpreted By:  Ronaldo Link, STUDY: CT ABDOMEN PELVIS WO IV CONTRAST;  9/11/2024 5:57 pm   INDICATION: Signs/Symptoms:r/o incarceration of hernia.   COMPARISON: CT abdomen and pelvis 08/29/2024   ACCESSION NUMBER(S): [id number]   ORDERING CLINICIAN: DILLON KUNZ   TECHNIQUE: Axial CT of the abdomen and pelvis was performed without intravenous contrast with coronal and sagittal reconstructions.   FINDINGS: Lower chest: Grossly stable large left pleural effusion with adjacent atelectasis.   Liver: Cirrhosis. Stable 1.6 cm anterior right hepatic hypodense lesion (series 4, image 40).   Biliary: Stable trace pneumobilia related to hepaticojejunostomy. Cholecystectomy.   Spleen: No mass. Stable splenomegaly measuring 14 cm craniocaudally..   Pancreas: Status post Whipple. Limited evaluation of the presumed remnant pancreas which appears atrophic. No main duct dilation.   Adrenals: No mass.   Kidneys: No calculus or hydronephrosis.   GI tract: Status post Whipple. Apparent asymmetric rectal wall thickening measuring up to 1.9 cm, new since 08/29/2024. No bowel wall thickening of the remaining colon or small bowel. A few loops of mildly dilated small bowel within midline ventral hernia, not significantly changed. Colonic diverticulosis without acute inflammation.   Lymph nodes: Multiple prominent peripancreatic/periportal and mesenteric lymph nodes, likely reactive.   Mesentery/peritoneum: Stable moderate-size simple attenuation ascites. No free air or fluid collection.   Vasculature: Mild-to-moderate abdominal aortic atherosclerotic calcifications without aneurysmal dilation. Stable perihepatic, perisplenic and paraesophageal varices.   Pelvis: Stable  moderate-size simple attenuation ascites. No free air or fluid collection. Stable moderate smooth circumferential bladder wall thickening with a posterolateral diverticulum, likely related to chronic urinary outlet obstruction. Enlarged prostate measuring 4.4 cm in transverse dimension.   Bones/Soft tissues: Redemonstration of a large midline supraumbilical ventral hernia with a 10 cm hernia neck. There are multiple loops of normal caliber and mildly dilated small bowel loops and small amount of free fluid within the hernia sac, similar to prior CT. There is an additional small right lateral paraumbilical hernia with a 2 cm neck containing fluid. Tiny fat containing bilateral inguinal hernias. Grossly unchanged lower lumbar degenerative disc disease.       No definite evidence of acute ventral hernia incarceration. Grossly similar appearance of midline large wide-neck bowel containing supraumbilical and small right lateral narrow-neck fluid containing paraumbilical hernias.   Stable nonemergent findings as described.   MACRO: None   Signed by: Ronaldo Link 9/11/2024 6:35 PM Dictation workstation:   [id number]    XR chest 1 view    Result Date: 9/11/2024  Interpreted By:  Jim Gomes, STUDY: XR CHEST 1 VIEW;  9/11/2024 3:34 pm   INDICATION: Signs/Symptoms:malaise.     COMPARISON: 9/9/2024   ACCESSION NUMBER(S): [id number]   ORDERING CLINICIAN: DILLON KUNZ   FINDINGS: AP portable view of the chest is obtained.  Limited exam due to portable nature. Magnified cardiac silhouette. Moderately sized left-sided pleural effusion and infiltrate. This appears improved since the prior exam. Right lung is clear.       1. Improved left-sided pleural effusion and infiltrate with however moderate residual.       MACRO: None   Signed by: Jim Gomes 9/11/2024 4:12 PM Dictation workstation:   [id number]    XR humerus left    Result Date: 9/9/2024  Interpreted By:  Tamir Luevano, STUDY: XR HUMERUS LEFT; ;  9/9/2024 4:19 pm    INDICATION: Signs/Symptoms:injury/pain.   COMPARISON: None.   ACCESSION NUMBER(S): [id number]   ORDERING CLINICIAN: DILLON KUNZ   FINDINGS: No fractures or destructive lesions are identified. The joint spaces and articular surfaces of the left shoulder and elbow are maintained. The alignment is anatomic. The soft tissues are unremarkable.       Normal radiographs of the left humerus.   Signed by: Tamir Luevano 9/9/2024 4:36 PM Dictation workstation:   [id number]    XR pelvis 1-2 views    Result Date: 9/9/2024  Interpreted By:  Tamir Luevano, STUDY: XR FEMUR LEFT 2+ VIEWS; XR PELVIS 1-2 VIEWS; ;  9/9/2024 4:19 pm   INDICATION: Signs/Symptoms:injury/pain; Signs/Symptoms:fall/injury.   COMPARISON: None.   ACCESSION NUMBER(S): [id number]; [id number]   ORDERING CLINICIAN: DILLON KUNZ   FINDINGS: Frontal view of the pelvis and additional four view radiographs of the left femur were obtained. There is no fracture or subluxation. Minimal degenerative changes of the hip joints and of the left knee joint are present, with small marginal osteophytes. Mild degenerative changes also involve the visualized lower lumbar spine. The alignment is anatomic. The soft tissues are unremarkable.       No acute fracture or subluxation.   Signed by: Tamir Luevano 9/9/2024 4:35 PM Dictation workstation:   [id number]    XR femur left 2+ views    Result Date: 9/9/2024  Interpreted By:  Tamir Luevano, STUDY: XR FEMUR LEFT 2+ VIEWS; XR PELVIS 1-2 VIEWS; ;  9/9/2024 4:19 pm   INDICATION: Signs/Symptoms:injury/pain; Signs/Symptoms:fall/injury.   COMPARISON: None.   ACCESSION NUMBER(S): [id number]; [id number]   ORDERING CLINICIAN: DILLON KUNZ   FINDINGS: Frontal view of the pelvis and additional four view radiographs of the left femur were obtained. There is no fracture or subluxation. Minimal degenerative changes of the hip joints and of the left knee joint are present, with small marginal osteophytes. Mild degenerative changes  also involve the visualized lower lumbar spine. The alignment is anatomic. The soft tissues are unremarkable.       No acute fracture or subluxation.   Signed by: Tamir Luevano 9/9/2024 4:35 PM Dictation workstation:   [id number]    XR chest 2 views    Result Date: 9/9/2024  Interpreted By:  Tamir Luevano, STUDY: XR CHEST 2 VIEWS; 9/9/2024 4:19 pm   INDICATION: Signs/Symptoms:injury/ pain   COMPARISON: 09/03/2024.   ACCESSION NUMBER(S): [id number]   ORDERING CLINICIAN: DILLON KUNZ   TECHNIQUE: Number of films: Two-view radiographs of the chest were obtained.   FINDINGS: The heart and mediastinum are normal. There is worsening left pleural effusion with possible associated atelectatic changes. No pneumothorax is seen. Degenerative changes involve the spine.       Worsening left pleural effusion.   Signed by: Tamir Luevano 9/9/2024 4:32 PM Dictation workstation:   [id number]    ECG 12 Lead    Result Date: 9/5/2024  Normal sinus rhythm Low voltage QRS RSR' or QR pattern in V1 suggests right ventricular conduction delay Prolonged QT Abnormal ECG When compared with ECG of 08-JUN-2024 01:18, T wave amplitude has decreased in Anterior leads Confirmed by Victorino Ortiz ([id number]) on 9/5/2024 9:57:49 AM    Transthoracic Echo (TTE) Limited    Result Date: 9/4/2024           [place] [address]            Phone [phone] TRANSTHORACIC ECHOCARDIOGRAM REPORT Patient Name:     HARPER Ferguson Physician:   [id number] Milan Nicole DO Study Date:       9/4/2024             Ordering Provider:   [id number] ALYX CAMILO MRN/PID:          [id number]             Fellow: Accession#:       [id number]         Nurse: Date of           1970 / 54 years Sonographer:         Darnell Garnica RDCS Birth/Age: Gender:           M                    Additional Staff:  Height:           162.56 cm            Admit Date: Weight:           71.67 kg             Admission Status:    Inpatient - Routine BSA / BMI:        1.77 m2 / 27.12      Department Location: [facility]                   kg/m2 Blood Pressure: 107 /78 mmHg Study Type:    TRANSTHORACIC ECHO (TTE) LIMITED Diagnosis/ICD: Shortness of breath-R06.02 Indication:    SOB CPT Codes:     Echo Limited-12364 Patient History: Smoker:            Former. Pertinent History: HTN, Hyperlipidemia, CVA and Dyspnea. Study Detail: The following Echo studies were performed: 2D. Technically               challenging study due to body habitus and patient lying in supine               position.  PHYSICIAN INTERPRETATION: Left Ventricle: Left ventricular ejection fraction is normal, by visual estimate at 65-70%. There are no regional wall motion abnormalities. The left ventricular cavity size is normal. Spectral Doppler shows a normal pattern of left ventricular diastolic filling. Left Atrium: The left atrium is normal in size. Right Ventricle: The right ventricle is normal in size. There is normal right ventricular global systolic function. Right Atrium: The right atrium is normal in size. Aortic Valve: The aortic valve is trileaflet. There is no evidence of aortic valve regurgitation. Mitral Valve: The mitral valve is normal in structure. Mitral valve regurgitation was not assessed. Tricuspid Valve: The tricuspid valve is structurally normal. Tricuspid regurgitation was not assessed. Pulmonic Valve: The pulmonic valve is not well visualized. The pulmonic valve regurgitation was not well visualized. Pericardium: There is no pericardial effusion noted. Aorta: The aortic root is normal.  CONCLUSIONS:  1. Left ventricular ejection fraction is normal, by visual estimate at 65-70%.  2. There is normal right ventricular global systolic function. QUANTITATIVE DATA SUMMARY: 2D MEASUREMENTS:                          Normal Ranges: IVSd:           0.92 cm   (0.6-1.1cm) LVPWd:         0.94 cm   (0.6-1.1cm) LVIDd:         3.58 cm   (3.9-5.9cm) LVIDs:         2.44 cm LV Mass Index: 54.0 g/m2 LV % FS        31.8 % LV SYSTOLIC FUNCTION BY 2D PLANIMETRY (MOD):                      Normal Ranges: EF-A4C View:    74 % (>=55%) EF-A2C View:    60 % EF-Biplane:     70 % EF-Visual:      68 % LV EF Reported: 68 %  [id number] Milan Nicole  Electronically signed on 9/4/2024 at 2:02:24 PM  ** Final **     US thoracentesis    Result Date: 9/4/2024  Interpreted By:  Virgilio Davila, STUDY: US THORACENTESIS; 9/3/2024 10:42 am   INDICATION: Left pleural effusion.   COMPARISON: None   ACCESSION NUMBER(S): [id number]   ORDERING CLINICIAN: ALYX CAMILO   TECHNIQUE: Informed consent obtained. Patient positionedsitting upright. Skin prepped, draped and anesthetized. Under ultrasound guidance, a centesis catheter/needle was advanced into leftpleural cavity.   FINDINGS: A total of 1.3 L of clear turner fluid was aspirated. The patient tolerated the procedure well.       Ultrasound-guided left thoracentesis.   Signed by: Virgilio Davila 9/4/2024 12:25 PM Dictation workstation:   [id number]    XR chest 1 view    Result Date: 9/3/2024  Interpreted By:  Oralia Jackman, STUDY: XR CHEST 1 VIEW 9/3/2024 2:31 pm   INDICATION: Status post thoracentesis   COMPARISON: 08/29/2024   ACCESSION NUMBER(S): [id number]   ORDERING CLINICIAN: TASIA SEVERINO   TECHNIQUE: AP semi-erect view of the chest at bedside   FINDINGS: Ultrasound-guided left thoracentesis has been performed with considerable decrease in size of the left pleural effusion. No pneumothorax is seen.       No pneumothorax following ultrasound-guided thoracentesis on the left. There is near complete evacuation of the left pleural effusion.   Signed by: Oralia Jackman 9/3/2024 4:09 PM Dictation workstation:   [id number]    Transthoracic Echo (TTE) Complete    Result Date: 9/3/2024           [address],  [place] [address]            Phone [phone] TRANSTHORACIC ECHOCARDIOGRAM REPORT Patient Name:     HARPER RICHARDSON        Reading Physician:   [id number] Milan Tadradha                                                             DO Study Date:       9/3/2024             Ordering Provider:   [id number] TASIA SEVERINO MRN/PID:          [id number]             Fellow: Accession#:       [id number]         Nurse: Date of           1970 / 54 years Sonographer:         Carri Alcala Birth/Age: Gender:           M                    Additional Staff: Height:           162.56 cm            Admit Date: Weight:           69.85 kg             Admission Status:    Inpatient - Routine BSA / BMI:        1.75 m2 / 26.43      Department Location: [facility]                   kg/m2 Blood Pressure: 110 /59 mmHg Study Type:    TRANSTHORACIC ECHO (TTE) COMPLETE Diagnosis/ICD: Other secondary hypertension-I15.8 CPT Codes:     Echo Complete w Full Doppler-42000  Study Detail: The following Echo studies were performed: 2D, M-Mode, Doppler,               color flow, Strain and 3D.  PHYSICIAN INTERPRETATION: Left Ventricle: Left ventricular ejection fraction is normal, by visual estimate at 60-65%. There are no regional wall motion abnormalities. The left ventricular cavity size is normal. Left Ventricular Global Longitudinal Strain - -22.5 %. Spectral Doppler shows an impaired relaxation pattern of left ventricular diastolic filling. Left Atrium: The left atrium is normal in size. Right Ventricle: The right ventricle is normal in size. There is normal right ventricular global systolic function. Right Atrium: The right atrium is normal in size. Aortic Valve: The aortic valve is trileaflet. The aortic valve dimensionless index is 1.01. There is no evidence of aortic valve regurgitation. The peak instantaneous gradient of the aortic valve is 6.1 mmHg. The mean gradient of the aortic  valve is 4.0 mmHg. Mitral Valve: The mitral valve is normal in structure. There is trace mitral valve regurgitation. Tricuspid Valve: The tricuspid valve is structurally normal. There is trace tricuspid regurgitation. Pulmonic Valve: The pulmonic valve is not well visualized. The pulmonic valve regurgitation was not well visualized. Pericardium: There is no pericardial effusion noted. Aorta: The aortic root is normal.  CONCLUSIONS:  1. Left ventricular ejection fraction is normal, by visual estimate at 60-65%.  2. Spectral Doppler shows an impaired relaxation pattern of left ventricular diastolic filling.  3. There is normal right ventricular global systolic function. QUANTITATIVE DATA SUMMARY: 2D MEASUREMENTS:                          Normal Ranges: IVSd:          1.08 cm   (0.6-1.1cm) LVPWd:         0.74 cm   (0.6-1.1cm) LVIDd:         4.37 cm   (3.9-5.9cm) LVIDs:         2.67 cm LV Mass Index: 73.5 g/m2 LV % FS        38.9 % LA VOLUME:                              Normal Ranges: LA Volume Index:  38.3 ml/m2 LA Vol A4C:       63.4 ml LA Vol A2C:       70.6 ml LA Vol Index BSA: 38.3 ml/m2 RA VOLUME BY A/L METHOD:                       Normal Ranges: RA Area A4C: 10.1 cm2 AORTA MEASUREMENTS:                      Normal Ranges: Ao Sinus, d: 3.20 cm (2.1-3.5cm) Ao STJ, d:   2.09 cm (1.7-3.4cm) Asc Ao, d:   2.80 cm (2.1-3.4cm) LV SYSTOLIC FUNCTION BY 2D PLANIMETRY (MOD):                                         Normal Ranges: EF-A4C View:                      65 %  (>=55%) EF-A2C View:                      68 % EF-Biplane:                       67 % EF-Visual:                        63 % EF-Auto:                          66 % LV EF Reported:                   63 % Global Longitudinal Strain (GLS): -22 % LV DIASTOLIC FUNCTION:                         Normal Ranges: MV Peak E:    0.90 m/s  (0.7-1.2 m/s) MV Peak A:    0.61 m/s  (0.42-0.7 m/s) E/A Ratio:    1.48      (1.0-2.2) MV e'         0.101 m/s (>8.0) MV lateral e'  0.11 m/s MV medial e'  0.09 m/s E/e' Ratio:   8.93      (<8.0) MITRAL VALVE:                 Normal Ranges: MV DT: 220 msec (150-240msec) AORTIC VALVE:                                   Normal Ranges: AoV Vmax:                1.23 m/s (<=1.7m/s) AoV Peak P.1 mmHg (<20mmHg) AoV Mean P.0 mmHg (1.7-11.5mmHg) LVOT Max Vickey:            1.23 m/s (<=1.1m/s) AoV VTI:                 28.20 cm (18-25cm) LVOT VTI:                28.50 cm LVOT Diameter:           2.00 cm  (1.8-2.4cm) AoV Area, VTI:           3.18 cm2 (2.5-5.5cm2) AoV Area,Vmax:           3.14 cm2 (2.5-4.5cm2) AoV Dimensionless Index: 1.01  RIGHT VENTRICLE: RV Basal 2.78 cm TAPSE:   29.1 mm RV s'    0.12 m/s  [id number] Milan Nicole DO Electronically signed on 9/3/2024 at 2:01:47 PM  ** Final **     US thoracentesis    Result Date: 9/3/2024  Interpreted By:  Virgilio Davila, STUDY: US THORACENTESIS; 2024 2:46 pm   INDICATION: Left pleural effusion   COMPARISON: None   ACCESSION NUMBER(S): [id number]   ORDERING CLINICIAN: VIRGILIO DAVILA   TECHNIQUE: Informed consent obtained. Patient positionedsitting upright. Skin prepped, draped and anesthetized. Under ultrasound guidance, a centesis catheter/needle was advanced into leftpleural cavity.   FINDINGS: A total of 1.7 L of clear yellow fluid was aspirated. A sample was sent for analysis. The patient tolerated the procedure well.       Ultrasound-guided left thoracentesis.   Signed by: Virgilio Davila 9/3/2024 10:00 AM Dictation workstation:   [id number]    US guided abdominal paracentesis    Result Date: 9/3/2024  Interpreted By:  Virgilio Davila, STUDY: US GUIDED ABDOMINAL PARACENTESIS; 2024 2:46 pm   INDICATION: Signs/Symptoms:ascites.   COMPARISON: None.   ACCESSION NUMBER(S): [id number]   ORDERING CLINICIAN: VIRGILIO DAVILA   TECHNIQUE: Ultrasound-guided paracentesis   FINDINGS: Informed consent obtained. Patient positioned supine. Right lower quadrant prepped, draped and  anesthetized. Under ultrasound guidance, 8 Norwegian centesis sheath needle inserted into peritoneal cavity. 1.5 Lof clear yellowfluid aspirated. Patient tolerated procedure well       Ultrasound-guided paracentesis.   Signed by: Virgilio Davila 9/3/2024 9:59 AM Dictation workstation:   [id number]    CT chest wo IV contrast    Result Date: 9/2/2024  Interpreted By:  Ai Gil, STUDY: CT CHEST WO IV CONTRAST;  9/2/2024 10:18 am   INDICATION: Signs/Symptoms:hypoxia.     COMPARISON: Chest x-ray 08/29/2024 and CT abdomen pelvis 08/29/2024   ACCESSION NUMBER(S): [id number]   ORDERING CLINICIAN: ALYX CAMILO   TECHNIQUE: Helical data acquisition of the chest was obtained  without IV contrast material.  Images were reformatted in axial, coronal, and sagittal planes. Images extend through the abdomen to the level of the iliac crest.   FINDINGS: LUNGS AND AIRWAYS: The trachea and central airways are patent. No endobronchial lesion.   Large left pleural effusion is only minimally decreased from the prior chest x-ray, with interval thoracentesis removing 1.7 L of fluid on 08/30/2024. Compressive atelectasis involves most of the left lower lobe. Some mild patchy ground-glass opacities are present in the left upper lobe.   There is minimal pleural fluid present on the right. A small peripheral infiltrate is present posterolateral aspect of the right middle lobe and there is some minimal patchy ground-glass opacity through the right lung.   MEDIASTINUM AND HANANE, LOWER NECK AND AXILLA: The visualized thyroid gland is within normal limits.   No evidence of thoracic lymphadenopathy by CT criteria.   Esophagus appears within normal limits as seen.   HEART AND VESSELS: The thoracic aorta is of normal course and caliber with mild patchy vascular calcifications.   Main pulmonary artery and its branches are normal in caliber.   Patchy coronary artery calcifications are seen. The study is not optimized for evaluation of coronary  arteries.   The cardiac chambers are not enlarged.   No evidence of pericardial effusion.   ABDOMEN: Liver is unchanged in size and contour with prominence of the left lobe and heterogeneity. Pneumobilia is unchanged. Gallbladder is absent.   Patient is status post Whipple procedure. The remaining portion of the pancreas is severely atrophic. There is no inflammation or gross mass.   Spleen is mildly enlarged measuring about 13 x 13 x 7 cm.   Adrenal glands appear normal.   No hydronephrosis, stone or gross mass is noted involving the kidneys.   Included bowel loops are not dilated. The large ventral hernia is partially excluded from field of view and appears grossly unchanged. Ascites is similar in volume to the prior study. There is no gross pneumoperitoneum noted.   Aorta shows no aneurysmal dilatation. Vena cava appears normal in size.   CHEST WALL AND OSSEOUS STRUCTURES: No acute or suspicious osseous abnormality is noted. There is mild bilateral gynecomastia.       1.  Large left pleural effusion with severe compressive atelectasis of the left lower lobe 2. Small patchy infiltrates in both lungs could represent pneumonia 3. Ascites is similar to the prior study. Once again there is a cirrhotic appearance of the liver and mild splenomegaly without change. With regard to the vague left hepatic lobe abnormality on the prior study, this is even more nondescript on the current exam. 4. Patient is status post Whipple procedure 5. Large midline ventral hernia containing bowel is incompletely included in the field of view but grossly unchanged   MACRO: None   Signed by: Ai Gil 9/2/2024 11:39 AM Dictation workstation:   [id number]    XR chest 1 view    Result Date: 8/29/2024  Interpreted By:  Holden Salinas, STUDY: XR CHEST 1 VIEW;  8/29/2024 6:04 pm   INDICATION: Signs/Symptoms:evaluate for pleural effusion seen on ct abd.   COMPARISON: Chest radiograph 06/08/2024   ACCESSION NUMBER(S): [id number]    ORDERING CLINICIAN: DILLON KUNZ   FINDINGS:     CARDIOMEDIASTINAL SILHOUETTE: Cardiomediastinal silhouette is stable in size and configuration.   LUNGS: Large left-sided pleural effusion with left lower lung airspace opacification.   ABDOMEN: No remarkable upper abdominal findings.   BONES: No acute osseous changes.       1.  Large left-sided pleural effusion.     Signed by: Holden Salinas 8/29/2024 6:13 PM Dictation workstation:   [id number]    CT abdomen pelvis wo IV contrast    Result Date: 8/29/2024  Interpreted By:  Tamir Luevano, STUDY: CT ABDOMEN PELVIS WO IV CONTRAST; 8/29/2024 3:42 pm   INDICATION: Signs/Symptoms:abdominal pain, hx of ascites   COMPARISON: 06/05/2024.   ACCESSION NUMBER(S): [id number]   ORDERING CLINICIAN: SURINDER LAZO   TECHNIQUE: Spiral axial unenhanced images were obtained from the upper abdomen to the symphysis pubis. Oral contrast was not administered.   All CT examinations are performed with one or more of the following dose reduction techniques: Automated Exposure Control, adjustment of mA and/or kV according to patient size, or use of iterative reconstruction techniques.   FINDINGS: Lung bases: There is interval significant worsening of large left pleural effusion with left basilar atelectatic changes/infiltrates. Liver: Heterogenous cirrhotic liver with vague hypodensity again noted in the left lobe, similar to prior exam. Mild pneumobilia again seen. Associated worsening ascites in the abdomen and pelvis. Spleen: Splenomegaly again noted, without focal lesions. Pancreas: Previous Whipple's procedure, similar to prior exam. Associated prominent peripancreatic/periportal lymph nodes, similar to prior exam. Adrenal glands: No focal lesions. The kidneys are normal in size and position. There are no renal calculi or hydronephrosis. No abnormal calcifications are seen within the course of the ureters or within the partially distended bladder. Pelvic reproductive organs: Small  calcifications again seen in the prostate gland.   Several colonic diverticula are again seen, without acute diverticulitis. There is again large ventral hernia containing several small bowel loops, without strangulation or bowel obstruction. Atherosclerotic calcifications involve the aorta and its branches, without focal aneurysm. Degenerative changes involve the L5-S1 level of the lumbar spine.       1. Interval significant worsening of large left pleural effusion left basilar infiltrates/atelectasis; correlate clinically and follow-up as needed. 2. Heterogenous cirrhotic liver with pneumobilia and stable hypodense lesion in the left lobe. Splenomegaly. Worsening ascites in the abdomen and pelvis. 3.  previous Whipple's procedure. Associated prominent peripancreatic/periportal lymph nodes. 4. Large ventral hernia again seen, containing several small bowel loops, without strangulation or bowel obstruction. Colonic diverticulosis without acute diverticulitis.   Signed by: Tamir Luevano 8/29/2024 4:15 PM Dictation workstation:   [id number]     Assessment/Plan   Hypoxia-oxygen saturation <94%, on room air currently  Coronavirus infection  History of cirrhosis with ascites -history of paracentesis  Allergy to zosyn-tolerating ceftriaxone     Continue ceftriaxone - tolerating  Continue remdesivir  Continue dexamethasone  Droplet plus precaution  Monitor WBC and temperature  Monitor  liver enzymes  Monitor oxygen saturation  Supportive care      Dario Schroeder MD

## 2024-09-15 NOTE — CARE PLAN
Problem: Pain - Adult  Goal: Verbalizes/displays adequate comfort level or baseline comfort level  Outcome: Progressing     Problem: Chronic Conditions and Co-morbidities  Goal: Patient's chronic conditions and co-morbidity symptoms are monitored and maintained or improved  Outcome: Progressing   The patient's goals for the shift include      The clinical goals for the shift include maintain oxygenation    Over the shift, the patient did not make progress toward the following goals. Barriers to progression include . Recommendations to address these barriers include   Problem: Skin  Goal: Decreased wound size/increased tissue granulation at next dressing change  Outcome: Progressing  Goal: Participates in plan/prevention/treatment measures  Outcome: Progressing  Goal: Prevent/manage excess moisture  Outcome: Progressing  Goal: Prevent/minimize sheer/friction injuries  Outcome: Progressing  Goal: Promote/optimize nutrition  Outcome: Progressing  Goal: Promote skin healing  Outcome: Progressing   .

## 2024-09-15 NOTE — PROGRESS NOTES
Nils Hogan is a 54 y.o. male on day 4 of admission presenting with Malaise and fatigue.    Subjective   Interval History:   Room not entered-limit exposure  Patient discussed with nurse  Patient observed  Lying comfortably    Review of Systems    Objective   Range of Vitals (last 24 hours)  Temp:  [36 °C (96.8 °F)-36.5 °C (97.7 °F)]   Resp:  [20-22]   BP: (104-116)/(66-80)   SpO2:  [92 %-97 %]   Daily Weight  09/12/24 : 72.5 kg (159 lb 13.3 oz)    Body mass index is 24.31 kg/m².    Physical Exam  Awake, alert  No acute distress  Lying in bed, comfortably    Antibiotics  cefTRIAXone - 1 gram/50 mL  DOXYCYCLINE  MG  ML D5W (ADV/MBP)    Relevant Results  Labs  Results from last 72 hours   Lab Units 09/14/24  0557 09/13/24  0523   WBC AUTO x10*3/uL 7.6 2.8*   HEMOGLOBIN g/dL 12.8* 12.1*   HEMATOCRIT % 36.0* 35.1*   PLATELETS AUTO x10*3/uL 90* 68*     Results from last 72 hours   Lab Units 09/14/24  0557 09/13/24  0523   SODIUM mmol/L 139 134   POTASSIUM mmol/L 3.1* 3.4   CHLORIDE mmol/L 101 99   CO2 mmol/L 29 27   BUN mg/dL 7* 6*   CREATININE mg/dL 0.40 0.40   GLUCOSE mg/dL 119* 142*   CALCIUM mg/dL 7.8* 7.4*   ANION GAP mmol/L 9 8   EGFR mL/min/1.73m*2 >90 >90     Results from last 72 hours   Lab Units 09/14/24  0557 09/13/24  0523   ALK PHOS U/L 168* 137*   BILIRUBIN TOTAL mg/dL 3.8* 4.1*   PROTEIN TOTAL g/dL 6.7 6.1   ALT U/L 29 29   AST U/L 105* 141*   ALBUMIN g/dL 2.1* 1.9*     Estimated Creatinine Clearance: 125 mL/min (by C-G formula based on SCr of 0.4 mg/dL).  CRP   Date Value Ref Range Status   03/27/2019 1.6 0 - 2.0 MG/DL Final     Comment:     Performed at [address] [address]     Microbiology  Reviewed  Imaging  XR chest 2 views    Result Date: 9/14/2024  Interpreted By:  Tamir Luevano, STUDY: XR CHEST 2 VIEWS; 9/14/2024 7:54 am   INDICATION: Signs/Symptoms:infiltrates   COMPARISON: 09/11/2024.   ACCESSION NUMBER(S): [id number]   ORDERING CLINICIAN: ALYX CAMILO    TECHNIQUE: Number of films: Two-view radiographs of the chest were obtained.   FINDINGS: The heart and mediastinum are normal. There is left pleural effusion and left basilar infiltrates/atelectatic changes; allowing for differences in technique there is worsening since the prior exam. The right lung is clear. No pneumothorax is seen. The osseous structures are unremarkable.       Worsening left effusion and left basilar infiltrates/atelectatic changes; correlate clinically and follow-up as needed.   Signed by: Tamir Luevano 9/14/2024 1:01 PM Dictation workstation:   [id number]    ECG 12 lead    Result Date: 9/12/2024  Normal sinus rhythm Normal ECG    US gallbladder    Result Date: 9/11/2024  Interpreted By:  Quentin Jung, STUDY: US GALLBLADDER;  9/11/2024 9:20 pm   INDICATION: Signs/Symptoms:Transaminitis, hyperbilirubinemia, abdominal pain/malaise.     COMPARISON: None.   ACCESSION NUMBER(S): [id number]   ORDERING CLINICIAN: DILLON KUNZ   TECHNIQUE: Multiple sonographic grayscale and color images of the right upper quadrant were performed.   FINDINGS: The examination is limited secondary to shadowing from overlying bowel gas.   There is heterogeneous echotexture of the liver and nodular contour. The patient is status post Whipple surgery with cholecystectomy and hepaticojejunostomy. Nonspecific cystic structure in the region of the left hepatic lobe. There is abdominal ascites.   No hydronephrosis right kidney.       Limited examination secondary to shadowing from overlying bowel gas.   Liver cirrhosis and abdominal ascites.   Postsurgical change of Whipple surgery with cholecystectomy and hepaticojejunostomy. Please note evaluation is limited on the ultrasound examination secondary to the shadowing and described postsurgical change, and if there is persistent concern for underlying pathology, follow-up contrast enhanced CT is recommended for further evaluation.   MACRO: None   Signed by: Quentin Jung  9/11/2024 9:43 PM Dictation workstation:   [id number]    CT abdomen pelvis wo IV contrast    Result Date: 9/11/2024  Interpreted By:  Ronaldo Link, STUDY: CT ABDOMEN PELVIS WO IV CONTRAST;  9/11/2024 5:57 pm   INDICATION: Signs/Symptoms:r/o incarceration of hernia.   COMPARISON: CT abdomen and pelvis 08/29/2024   ACCESSION NUMBER(S): [id number]   ORDERING CLINICIAN: DILLON KUNZ   TECHNIQUE: Axial CT of the abdomen and pelvis was performed without intravenous contrast with coronal and sagittal reconstructions.   FINDINGS: Lower chest: Grossly stable large left pleural effusion with adjacent atelectasis.   Liver: Cirrhosis. Stable 1.6 cm anterior right hepatic hypodense lesion (series 4, image 40).   Biliary: Stable trace pneumobilia related to hepaticojejunostomy. Cholecystectomy.   Spleen: No mass. Stable splenomegaly measuring 14 cm craniocaudally..   Pancreas: Status post Whipple. Limited evaluation of the presumed remnant pancreas which appears atrophic. No main duct dilation.   Adrenals: No mass.   Kidneys: No calculus or hydronephrosis.   GI tract: Status post Whipple. Apparent asymmetric rectal wall thickening measuring up to 1.9 cm, new since 08/29/2024. No bowel wall thickening of the remaining colon or small bowel. A few loops of mildly dilated small bowel within midline ventral hernia, not significantly changed. Colonic diverticulosis without acute inflammation.   Lymph nodes: Multiple prominent peripancreatic/periportal and mesenteric lymph nodes, likely reactive.   Mesentery/peritoneum: Stable moderate-size simple attenuation ascites. No free air or fluid collection.   Vasculature: Mild-to-moderate abdominal aortic atherosclerotic calcifications without aneurysmal dilation. Stable perihepatic, perisplenic and paraesophageal varices.   Pelvis: Stable moderate-size simple attenuation ascites. No free air or fluid collection. Stable moderate smooth circumferential bladder wall thickening with a  posterolateral diverticulum, likely related to chronic urinary outlet obstruction. Enlarged prostate measuring 4.4 cm in transverse dimension.   Bones/Soft tissues: Redemonstration of a large midline supraumbilical ventral hernia with a 10 cm hernia neck. There are multiple loops of normal caliber and mildly dilated small bowel loops and small amount of free fluid within the hernia sac, similar to prior CT. There is an additional small right lateral paraumbilical hernia with a 2 cm neck containing fluid. Tiny fat containing bilateral inguinal hernias. Grossly unchanged lower lumbar degenerative disc disease.       No definite evidence of acute ventral hernia incarceration. Grossly similar appearance of midline large wide-neck bowel containing supraumbilical and small right lateral narrow-neck fluid containing paraumbilical hernias.   Stable nonemergent findings as described.   MACRO: None   Signed by: Ronaldo Link 9/11/2024 6:35 PM Dictation workstation:   [id number]    XR chest 1 view    Result Date: 9/11/2024  Interpreted By:  Jim Gomes, STUDY: XR CHEST 1 VIEW;  9/11/2024 3:34 pm   INDICATION: Signs/Symptoms:malaise.     COMPARISON: 9/9/2024   ACCESSION NUMBER(S): [id number]   ORDERING CLINICIAN: DILLON KUNZ   FINDINGS: AP portable view of the chest is obtained.  Limited exam due to portable nature. Magnified cardiac silhouette. Moderately sized left-sided pleural effusion and infiltrate. This appears improved since the prior exam. Right lung is clear.       1. Improved left-sided pleural effusion and infiltrate with however moderate residual.       MACRO: None   Signed by: Jim Gomes 9/11/2024 4:12 PM Dictation workstation:   [id number]    XR humerus left    Result Date: 9/9/2024  Interpreted By:  Tamir Luevano, STUDY: XR HUMERUS LEFT; ;  9/9/2024 4:19 pm   INDICATION: Signs/Symptoms:injury/pain.   COMPARISON: None.   ACCESSION NUMBER(S): [id number]   ORDERING CLINICIAN: DILLON KUNZ   FINDINGS: No  fractures or destructive lesions are identified. The joint spaces and articular surfaces of the left shoulder and elbow are maintained. The alignment is anatomic. The soft tissues are unremarkable.       Normal radiographs of the left humerus.   Signed by: Tamir Luevano 9/9/2024 4:36 PM Dictation workstation:   [id number]    XR pelvis 1-2 views    Result Date: 9/9/2024  Interpreted By:  Tamir Luevano, STUDY: XR FEMUR LEFT 2+ VIEWS; XR PELVIS 1-2 VIEWS; ;  9/9/2024 4:19 pm   INDICATION: Signs/Symptoms:injury/pain; Signs/Symptoms:fall/injury.   COMPARISON: None.   ACCESSION NUMBER(S): [id number]; [id number]   ORDERING CLINICIAN: DILLON KUNZ   FINDINGS: Frontal view of the pelvis and additional four view radiographs of the left femur were obtained. There is no fracture or subluxation. Minimal degenerative changes of the hip joints and of the left knee joint are present, with small marginal osteophytes. Mild degenerative changes also involve the visualized lower lumbar spine. The alignment is anatomic. The soft tissues are unremarkable.       No acute fracture or subluxation.   Signed by: Tamir Luevano 9/9/2024 4:35 PM Dictation workstation:   [id number]    XR femur left 2+ views    Result Date: 9/9/2024  Interpreted By:  Tamir Luevano, STUDY: XR FEMUR LEFT 2+ VIEWS; XR PELVIS 1-2 VIEWS; ;  9/9/2024 4:19 pm   INDICATION: Signs/Symptoms:injury/pain; Signs/Symptoms:fall/injury.   COMPARISON: None.   ACCESSION NUMBER(S): [id number]; [id number]   ORDERING CLINICIAN: DILLON KUNZ   FINDINGS: Frontal view of the pelvis and additional four view radiographs of the left femur were obtained. There is no fracture or subluxation. Minimal degenerative changes of the hip joints and of the left knee joint are present, with small marginal osteophytes. Mild degenerative changes also involve the visualized lower lumbar spine. The alignment is anatomic. The soft tissues are unremarkable.       No acute fracture or  subluxation.   Signed by: Tamir Luevano 9/9/2024 4:35 PM Dictation workstation:   [id number]    XR chest 2 views    Result Date: 9/9/2024  Interpreted By:  Tamir Luevano, STUDY: XR CHEST 2 VIEWS; 9/9/2024 4:19 pm   INDICATION: Signs/Symptoms:injury/ pain   COMPARISON: 09/03/2024.   ACCESSION NUMBER(S): [id number]   ORDERING CLINICIAN: DILLON KUNZ   TECHNIQUE: Number of films: Two-view radiographs of the chest were obtained.   FINDINGS: The heart and mediastinum are normal. There is worsening left pleural effusion with possible associated atelectatic changes. No pneumothorax is seen. Degenerative changes involve the spine.       Worsening left pleural effusion.   Signed by: Tamir Luevano 9/9/2024 4:32 PM Dictation workstation:   [id number]    ECG 12 Lead    Result Date: 9/5/2024  Normal sinus rhythm Low voltage QRS RSR' or QR pattern in V1 suggests right ventricular conduction delay Prolonged QT Abnormal ECG When compared with ECG of 08-JUN-2024 01:18, T wave amplitude has decreased in Anterior leads Confirmed by Victorino Ortiz ([id number]) on 9/5/2024 9:57:49 AM    Transthoracic Echo (TTE) Limited    Result Date: 9/4/2024           [place] [address]            Phone [phone] TRANSTHORACIC ECHOCARDIOGRAM REPORT Patient Name:     HARPER Ferguson Physician:   [id number] Milan Nicole DO Study Date:       9/4/2024             Ordering Provider:   [id number] ALYX CAMILO MRN/PID:          [id number]             Fellow: Accession#:       [id number]         Nurse: Date of           1970 / 54 years Sonographer:         Darnell Garnica RDCS Birth/Age: Gender:           M                    Additional Staff: Height:           162.56 cm            Admit Date: Weight:           71.67 kg             Admission Status:    Inpatient - Routine BSA /  BMI:        1.77 m2 / 27.12      Department Location: [facility]                   kg/m2 Blood Pressure: 107 /78 mmHg Study Type:    TRANSTHORACIC ECHO (TTE) LIMITED Diagnosis/ICD: Shortness of breath-R06.02 Indication:    SOB CPT Codes:     Echo Limited-49268 Patient History: Smoker:            Former. Pertinent History: HTN, Hyperlipidemia, CVA and Dyspnea. Study Detail: The following Echo studies were performed: 2D. Technically               challenging study due to body habitus and patient lying in supine               position.  PHYSICIAN INTERPRETATION: Left Ventricle: Left ventricular ejection fraction is normal, by visual estimate at 65-70%. There are no regional wall motion abnormalities. The left ventricular cavity size is normal. Spectral Doppler shows a normal pattern of left ventricular diastolic filling. Left Atrium: The left atrium is normal in size. Right Ventricle: The right ventricle is normal in size. There is normal right ventricular global systolic function. Right Atrium: The right atrium is normal in size. Aortic Valve: The aortic valve is trileaflet. There is no evidence of aortic valve regurgitation. Mitral Valve: The mitral valve is normal in structure. Mitral valve regurgitation was not assessed. Tricuspid Valve: The tricuspid valve is structurally normal. Tricuspid regurgitation was not assessed. Pulmonic Valve: The pulmonic valve is not well visualized. The pulmonic valve regurgitation was not well visualized. Pericardium: There is no pericardial effusion noted. Aorta: The aortic root is normal.  CONCLUSIONS:  1. Left ventricular ejection fraction is normal, by visual estimate at 65-70%.  2. There is normal right ventricular global systolic function. QUANTITATIVE DATA SUMMARY: 2D MEASUREMENTS:                          Normal Ranges: IVSd:          0.92 cm   (0.6-1.1cm) LVPWd:         0.94 cm   (0.6-1.1cm) LVIDd:         3.58 cm   (3.9-5.9cm) LVIDs:         2.44 cm LV Mass Index: 54.0  g/m2 LV % FS        31.8 % LV SYSTOLIC FUNCTION BY 2D PLANIMETRY (MOD):                      Normal Ranges: EF-A4C View:    74 % (>=55%) EF-A2C View:    60 % EF-Biplane:     70 % EF-Visual:      68 % LV EF Reported: 68 %  [id number] Milan Nicole  Electronically signed on 9/4/2024 at 2:02:24 PM  ** Final **     US thoracentesis    Result Date: 9/4/2024  Interpreted By:  Virgilio Davila, STUDY: US THORACENTESIS; 9/3/2024 10:42 am   INDICATION: Left pleural effusion.   COMPARISON: None   ACCESSION NUMBER(S): [id number]   ORDERING CLINICIAN: ALYX CAMILO   TECHNIQUE: Informed consent obtained. Patient positionedsitting upright. Skin prepped, draped and anesthetized. Under ultrasound guidance, a centesis catheter/needle was advanced into leftpleural cavity.   FINDINGS: A total of 1.3 L of clear turner fluid was aspirated. The patient tolerated the procedure well.       Ultrasound-guided left thoracentesis.   Signed by: Virgilio Davila 9/4/2024 12:25 PM Dictation workstation:   [id number]    XR chest 1 view    Result Date: 9/3/2024  Interpreted By:  Oralia Jackman, STUDY: XR CHEST 1 VIEW 9/3/2024 2:31 pm   INDICATION: Status post thoracentesis   COMPARISON: 08/29/2024   ACCESSION NUMBER(S): [id number]   ORDERING CLINICIAN: TASIA SEVERINO   TECHNIQUE: AP semi-erect view of the chest at bedside   FINDINGS: Ultrasound-guided left thoracentesis has been performed with considerable decrease in size of the left pleural effusion. No pneumothorax is seen.       No pneumothorax following ultrasound-guided thoracentesis on the left. There is near complete evacuation of the left pleural effusion.   Signed by: Oralia Jackman 9/3/2024 4:09 PM Dictation workstation:   [id number]    Transthoracic Echo (TTE) Complete    Result Date: 9/3/2024           [place] [address]            Phone [phone] TRANSTHORACIC ECHOCARDIOGRAM REPORT Patient Name:     HARPER Ferguson Physician:    [id number] Milan Nicole DO Study Date:       9/3/2024             Ordering Provider:   [id number] TASIA SEVERINO MRN/PID:          [id number]             Fellow: Accession#:       [id number]         Nurse: Date of           1970 / 54 years Sonographer:         Carri Alcala Birth/Age: Gender:           M                    Additional Staff: Height:           162.56 cm            Admit Date: Weight:           69.85 kg             Admission Status:    Inpatient - Routine BSA / BMI:        1.75 m2 / 26.43      Department Location: [facility]                   kg/m2 Blood Pressure: 110 /59 mmHg Study Type:    TRANSTHORACIC ECHO (TTE) COMPLETE Diagnosis/ICD: Other secondary hypertension-I15.8 CPT Codes:     Echo Complete w Full Doppler-19920  Study Detail: The following Echo studies were performed: 2D, M-Mode, Doppler,               color flow, Strain and 3D.  PHYSICIAN INTERPRETATION: Left Ventricle: Left ventricular ejection fraction is normal, by visual estimate at 60-65%. There are no regional wall motion abnormalities. The left ventricular cavity size is normal. Left Ventricular Global Longitudinal Strain - -22.5 %. Spectral Doppler shows an impaired relaxation pattern of left ventricular diastolic filling. Left Atrium: The left atrium is normal in size. Right Ventricle: The right ventricle is normal in size. There is normal right ventricular global systolic function. Right Atrium: The right atrium is normal in size. Aortic Valve: The aortic valve is trileaflet. The aortic valve dimensionless index is 1.01. There is no evidence of aortic valve regurgitation. The peak instantaneous gradient of the aortic valve is 6.1 mmHg. The mean gradient of the aortic valve is 4.0 mmHg. Mitral Valve: The mitral valve is normal in structure. There is trace mitral valve regurgitation. Tricuspid Valve: The  tricuspid valve is structurally normal. There is trace tricuspid regurgitation. Pulmonic Valve: The pulmonic valve is not well visualized. The pulmonic valve regurgitation was not well visualized. Pericardium: There is no pericardial effusion noted. Aorta: The aortic root is normal.  CONCLUSIONS:  1. Left ventricular ejection fraction is normal, by visual estimate at 60-65%.  2. Spectral Doppler shows an impaired relaxation pattern of left ventricular diastolic filling.  3. There is normal right ventricular global systolic function. QUANTITATIVE DATA SUMMARY: 2D MEASUREMENTS:                          Normal Ranges: IVSd:          1.08 cm   (0.6-1.1cm) LVPWd:         0.74 cm   (0.6-1.1cm) LVIDd:         4.37 cm   (3.9-5.9cm) LVIDs:         2.67 cm LV Mass Index: 73.5 g/m2 LV % FS        38.9 % LA VOLUME:                              Normal Ranges: LA Volume Index:  38.3 ml/m2 LA Vol A4C:       63.4 ml LA Vol A2C:       70.6 ml LA Vol Index BSA: 38.3 ml/m2 RA VOLUME BY A/L METHOD:                       Normal Ranges: RA Area A4C: 10.1 cm2 AORTA MEASUREMENTS:                      Normal Ranges: Ao Sinus, d: 3.20 cm (2.1-3.5cm) Ao STJ, d:   2.09 cm (1.7-3.4cm) Asc Ao, d:   2.80 cm (2.1-3.4cm) LV SYSTOLIC FUNCTION BY 2D PLANIMETRY (MOD):                                         Normal Ranges: EF-A4C View:                      65 %  (>=55%) EF-A2C View:                      68 % EF-Biplane:                       67 % EF-Visual:                        63 % EF-Auto:                          66 % LV EF Reported:                   63 % Global Longitudinal Strain (GLS): -22 % LV DIASTOLIC FUNCTION:                         Normal Ranges: MV Peak E:    0.90 m/s  (0.7-1.2 m/s) MV Peak A:    0.61 m/s  (0.42-0.7 m/s) E/A Ratio:    1.48      (1.0-2.2) MV e'         0.101 m/s (>8.0) MV lateral e' 0.11 m/s MV medial e'  0.09 m/s E/e' Ratio:   8.93      (<8.0) MITRAL VALVE:                 Normal Ranges: MV DT: 220 msec (150-240msec)  AORTIC VALVE:                                   Normal Ranges: AoV Vmax:                1.23 m/s (<=1.7m/s) AoV Peak P.1 mmHg (<20mmHg) AoV Mean P.0 mmHg (1.7-11.5mmHg) LVOT Max Vickey:            1.23 m/s (<=1.1m/s) AoV VTI:                 28.20 cm (18-25cm) LVOT VTI:                28.50 cm LVOT Diameter:           2.00 cm  (1.8-2.4cm) AoV Area, VTI:           3.18 cm2 (2.5-5.5cm2) AoV Area,Vmax:           3.14 cm2 (2.5-4.5cm2) AoV Dimensionless Index: 1.01  RIGHT VENTRICLE: RV Basal 2.78 cm TAPSE:   29.1 mm RV s'    0.12 m/s  [id number] Milan Tadradha TY Electronically signed on 9/3/2024 at 2:01:47 PM  ** Final **     US thoracentesis    Result Date: 9/3/2024  Interpreted By:  Francisco Javier Davial, STUDY: US THORACENTESIS; 2024 2:46 pm   INDICATION: Left pleural effusion   COMPARISON: None   ACCESSION NUMBER(S): [id number]   ORDERING CLINICIAN: FRANCISCO JAVIER DAVILA   TECHNIQUE: Informed consent obtained. Patient positionedsitting upright. Skin prepped, draped and anesthetized. Under ultrasound guidance, a centesis catheter/needle was advanced into leftpleural cavity.   FINDINGS: A total of 1.7 L of clear yellow fluid was aspirated. A sample was sent for analysis. The patient tolerated the procedure well.       Ultrasound-guided left thoracentesis.   Signed by: Francisco Javier Davila 9/3/2024 10:00 AM Dictation workstation:   [id number]    US guided abdominal paracentesis    Result Date: 9/3/2024  Interpreted By:  Francisco Javier Davila, STUDY: US GUIDED ABDOMINAL PARACENTESIS; 2024 2:46 pm   INDICATION: Signs/Symptoms:ascites.   COMPARISON: None.   ACCESSION NUMBER(S): [id number]   ORDERING CLINICIAN: FRANCISCO JAVIER DAVILA   TECHNIQUE: Ultrasound-guided paracentesis   FINDINGS: Informed consent obtained. Patient positioned supine. Right lower quadrant prepped, draped and anesthetized. Under ultrasound guidance, 8 [place] centesis sheath needle inserted into peritoneal cavity. 1.5 Lof clear yellowfluid aspirated.  Patient tolerated procedure well       Ultrasound-guided paracentesis.   Signed by: Virgilio Davila 9/3/2024 9:59 AM Dictation workstation:   [id number]    CT chest wo IV contrast    Result Date: 9/2/2024  Interpreted By:  Ai Gil, STUDY: CT CHEST WO IV CONTRAST;  9/2/2024 10:18 am   INDICATION: Signs/Symptoms:hypoxia.     COMPARISON: Chest x-ray 08/29/2024 and CT abdomen pelvis 08/29/2024   ACCESSION NUMBER(S): [id number]   ORDERING CLINICIAN: ALYX CAMILO   TECHNIQUE: Helical data acquisition of the chest was obtained  without IV contrast material.  Images were reformatted in axial, coronal, and sagittal planes. Images extend through the abdomen to the level of the iliac crest.   FINDINGS: LUNGS AND AIRWAYS: The trachea and central airways are patent. No endobronchial lesion.   Large left pleural effusion is only minimally decreased from the prior chest x-ray, with interval thoracentesis removing 1.7 L of fluid on 08/30/2024. Compressive atelectasis involves most of the left lower lobe. Some mild patchy ground-glass opacities are present in the left upper lobe.   There is minimal pleural fluid present on the right. A small peripheral infiltrate is present posterolateral aspect of the right middle lobe and there is some minimal patchy ground-glass opacity through the right lung.   MEDIASTINUM AND HANANE, LOWER NECK AND AXILLA: The visualized thyroid gland is within normal limits.   No evidence of thoracic lymphadenopathy by CT criteria.   Esophagus appears within normal limits as seen.   HEART AND VESSELS: The thoracic aorta is of normal course and caliber with mild patchy vascular calcifications.   Main pulmonary artery and its branches are normal in caliber.   Patchy coronary artery calcifications are seen. The study is not optimized for evaluation of coronary arteries.   The cardiac chambers are not enlarged.   No evidence of pericardial effusion.   ABDOMEN: Liver is unchanged in size and contour with  prominence of the left lobe and heterogeneity. Pneumobilia is unchanged. Gallbladder is absent.   Patient is status post Whipple procedure. The remaining portion of the pancreas is severely atrophic. There is no inflammation or gross mass.   Spleen is mildly enlarged measuring about 13 x 13 x 7 cm.   Adrenal glands appear normal.   No hydronephrosis, stone or gross mass is noted involving the kidneys.   Included bowel loops are not dilated. The large ventral hernia is partially excluded from field of view and appears grossly unchanged. Ascites is similar in volume to the prior study. There is no gross pneumoperitoneum noted.   Aorta shows no aneurysmal dilatation. Vena cava appears normal in size.   CHEST WALL AND OSSEOUS STRUCTURES: No acute or suspicious osseous abnormality is noted. There is mild bilateral gynecomastia.       1.  Large left pleural effusion with severe compressive atelectasis of the left lower lobe 2. Small patchy infiltrates in both lungs could represent pneumonia 3. Ascites is similar to the prior study. Once again there is a cirrhotic appearance of the liver and mild splenomegaly without change. With regard to the vague left hepatic lobe abnormality on the prior study, this is even more nondescript on the current exam. 4. Patient is status post Whipple procedure 5. Large midline ventral hernia containing bowel is incompletely included in the field of view but grossly unchanged   MACRO: None   Signed by: Ai Gil 9/2/2024 11:39 AM Dictation workstation:   [id number]    XR chest 1 view    Result Date: 8/29/2024  Interpreted By:  Holden Salinas, STUDY: XR CHEST 1 VIEW;  8/29/2024 6:04 pm   INDICATION: Signs/Symptoms:evaluate for pleural effusion seen on ct abd.   COMPARISON: Chest radiograph 06/08/2024   ACCESSION NUMBER(S): [id number]   ORDERING CLINICIAN: DILLON KUNZ   FINDINGS:     CARDIOMEDIASTINAL SILHOUETTE: Cardiomediastinal silhouette is stable in size and configuration.    LUNGS: Large left-sided pleural effusion with left lower lung airspace opacification.   ABDOMEN: No remarkable upper abdominal findings.   BONES: No acute osseous changes.       1.  Large left-sided pleural effusion.     Signed by: Holden Salinas 8/29/2024 6:13 PM Dictation workstation:   [id number]    CT abdomen pelvis wo IV contrast    Result Date: 8/29/2024  Interpreted By:  Tamir Luevano, STUDY: CT ABDOMEN PELVIS WO IV CONTRAST; 8/29/2024 3:42 pm   INDICATION: Signs/Symptoms:abdominal pain, hx of ascites   COMPARISON: 06/05/2024.   ACCESSION NUMBER(S): [id number]   ORDERING CLINICIAN: SURINDER LAZO   TECHNIQUE: Spiral axial unenhanced images were obtained from the upper abdomen to the symphysis pubis. Oral contrast was not administered.   All CT examinations are performed with one or more of the following dose reduction techniques: Automated Exposure Control, adjustment of mA and/or kV according to patient size, or use of iterative reconstruction techniques.   FINDINGS: Lung bases: There is interval significant worsening of large left pleural effusion with left basilar atelectatic changes/infiltrates. Liver: Heterogenous cirrhotic liver with vague hypodensity again noted in the left lobe, similar to prior exam. Mild pneumobilia again seen. Associated worsening ascites in the abdomen and pelvis. Spleen: Splenomegaly again noted, without focal lesions. Pancreas: Previous Whipple's procedure, similar to prior exam. Associated prominent peripancreatic/periportal lymph nodes, similar to prior exam. Adrenal glands: No focal lesions. The kidneys are normal in size and position. There are no renal calculi or hydronephrosis. No abnormal calcifications are seen within the course of the ureters or within the partially distended bladder. Pelvic reproductive organs: Small calcifications again seen in the prostate gland.   Several colonic diverticula are again seen, without acute diverticulitis. There is again large  ventral hernia containing several small bowel loops, without strangulation or bowel obstruction. Atherosclerotic calcifications involve the aorta and its branches, without focal aneurysm. Degenerative changes involve the L5-S1 level of the lumbar spine.       1. Interval significant worsening of large left pleural effusion left basilar infiltrates/atelectasis; correlate clinically and follow-up as needed. 2. Heterogenous cirrhotic liver with pneumobilia and stable hypodense lesion in the left lobe. Splenomegaly. Worsening ascites in the abdomen and pelvis. 3.  previous Whipple's procedure. Associated prominent peripancreatic/periportal lymph nodes. 4. Large ventral hernia again seen, containing several small bowel loops, without strangulation or bowel obstruction. Colonic diverticulosis without acute diverticulitis.   Signed by: Tamir Luevano 8/29/2024 4:15 PM Dictation workstation:   [id number]     Assessment/Plan     Acute hypoxic respiratory failure  Coronavirus infection  History of cirrhosis with ascites -history of paracentesis  Allergy to zosyn-tolerating ceftriaxone  Ventral hernia, management by General Surgery     Continue ceftriaxone - tolerating  Continue remdesivir-5 days total  Continue dexamethasone-total of 10 days  Oxygen as needed  Droplet plus precaution  Monitor WBC and temperature  Monitor  liver enzymes  Supportive care    Dario Schroeder MD

## 2024-09-15 NOTE — CARE PLAN
The patient's goals for the shift include      The clinical goals for the shift include maintain oxygenation    Over the shift, the patient did not make progress toward the following goals.       Problem: Pain - Adult  Goal: Verbalizes/displays adequate comfort level or baseline comfort level  Outcome: Progressing     Problem: Safety - Adult  Goal: Free from fall injury  Outcome: Progressing     Problem: Discharge Planning  Goal: Discharge to home or other facility with appropriate resources  Outcome: Progressing     Problem: Chronic Conditions and Co-morbidities  Goal: Patient's chronic conditions and co-morbidity symptoms are monitored and maintained or improved  Outcome: Progressing     Problem: Fall/Injury  Goal: Not fall by end of shift  Outcome: Progressing  Goal: Be free from injury by end of the shift  Outcome: Progressing  Goal: Verbalize understanding of personal risk factors for fall in the hospital  Outcome: Progressing  Goal: Verbalize understanding of risk factor reduction measures to prevent injury from fall in the home  Outcome: Progressing  Goal: Use assistive devices by end of the shift  Outcome: Progressing  Goal: Pace activities to prevent fatigue by end of the shift  Outcome: Progressing     Problem: Skin  Goal: Decreased wound size/increased tissue granulation at next dressing change  Outcome: Progressing  Goal: Participates in plan/prevention/treatment measures  Outcome: Progressing  Goal: Prevent/manage excess moisture  Outcome: Progressing  Goal: Prevent/minimize sheer/friction injuries  Outcome: Progressing  Goal: Promote/optimize nutrition  Outcome: Progressing  Goal: Promote skin healing  Outcome: Progressing     Problem: Pain  Goal: Takes deep breaths with improved pain control throughout the shift  Outcome: Progressing  Goal: Turns in bed with improved pain control throughout the shift  Outcome: Progressing  Goal: Walks with improved pain control throughout the shift  Outcome:  Progressing  Goal: Performs ADL's with improved pain control throughout shift  Outcome: Progressing  Goal: Participates in PT with improved pain control throughout the shift  Outcome: Progressing  Goal: Free from opioid side effects throughout the shift  Outcome: Progressing  Goal: Free from acute confusion related to pain meds throughout the shift  Outcome: Progressing     Problem: Nutrition  Goal: Oral intake greater 75%  Outcome: Progressing

## 2024-09-15 NOTE — PROGRESS NOTES
Nils Hogan is a 54 y.o. male on day 4 of admission presenting with Malaise and fatigue.      Subjective   Better much less cough, wants to go home''dog will die if he does not go home''       Objective     Last Recorded Vitals  /64 (BP Location: Left arm, Patient Position: Lying)   Pulse 74   Temp 36.2 °C (97.2 °F) (Temporal)   Resp 20   Wt 72.5 kg (159 lb 13.3 oz)   SpO2 93%   Intake/Output last 3 Shifts:    Intake/Output Summary (Last 24 hours) at 9/15/2024 0843  Last data filed at 9/15/2024 0827  Gross per 24 hour   Intake 700 ml   Output 1480 ml   Net -780 ml       Admission Weight  Weight: 71.7 kg (158 lb) (09/11/24 1424)    Daily Weight  09/12/24 : 72.5 kg (159 lb 13.3 oz)    Image Results  XR chest 2 views  Narrative: Interpreted By:  Tamir Luevano,   STUDY:  XR CHEST 2 VIEWS; 9/14/2024 7:54 am      INDICATION:  Signs/Symptoms:infiltrates      COMPARISON:  09/11/2024.      ACCESSION NUMBER(S):  [id number]      ORDERING CLINICIAN:  ALYX CAMILO      TECHNIQUE:  Number of films: Two-view radiographs of the chest were obtained.      FINDINGS:  The heart and mediastinum are normal.  There is left pleural effusion and left basilar  infiltrates/atelectatic changes; allowing for differences in  technique there is worsening since the prior exam. The right lung is  clear. No pneumothorax is seen.  The osseous structures are unremarkable.      Impression: Worsening left effusion and left basilar infiltrates/atelectatic  changes; correlate clinically and follow-up as needed.      Signed by: Tamir Luevano 9/14/2024 1:01 PM  Dictation workstation:   [id number]      Physical Exam/improoved    Relevant Results               Assessment/Plan                  Assessment & Plan  Malaise and fatigue  COVID positive   Xray of chest revealed improved left-sided pleural effusion and infiltrate with however  moderate residual. Echo completed on 09/04/2024 which was normal.   -Ordered Remdesivir  -Continuous  telemetry  -Ordered continuous supplemental oxygenation PRN, >92%  -PT/OT to evaluate ad treat  -Consulted ID   Urinary tract infection  UA was positive for WBC and RBC  -Ordered Rocephin, per previous notes has anaphylactic allergy to zosyn but has tolerated meropenem, keflex,ceftriaxone. Monitor for adverse reactions.  Hypokalemia  -Ordered oral potassium replacement  -Monitor   Anemia  No signs and symptoms of bleeding  -HGB stable  -Monitor  Alcoholic cirrhosis of liver with ascites (Multi)  ALT 47    Stable  -Continue home medications, Lasix and Aldactone    -Low Na diet  Hypertension  -Continue home medication, Lopressor  Hypoxia home o2 sharyn Brunson MD

## 2024-09-16 ENCOUNTER — PHARMACY VISIT (OUTPATIENT)
Dept: PHARMACY | Facility: CLINIC | Age: 54
End: 2024-09-16
Payer: COMMERCIAL

## 2024-09-16 LAB
ANION GAP SERPL CALC-SCNC: 12 MMOL/L
BUN SERPL-MCNC: 10 MG/DL (ref 8–25)
CALCIUM SERPL-MCNC: 7.5 MG/DL (ref 8.5–10.4)
CHLORIDE SERPL-SCNC: 96 MMOL/L (ref 97–107)
CO2 SERPL-SCNC: 28 MMOL/L (ref 24–31)
CREAT SERPL-MCNC: 0.4 MG/DL (ref 0.4–1.6)
D DIMER PPP FEU-MCNC: 5.78 MG/L FEU (ref 0.19–0.5)
EGFRCR SERPLBLD CKD-EPI 2021: >90 ML/MIN/1.73M*2
GLUCOSE SERPL-MCNC: 218 MG/DL (ref 65–99)
MAGNESIUM SERPL-MCNC: 1.2 MG/DL (ref 1.6–3.1)
MAGNESIUM SERPL-MCNC: 1.7 MG/DL (ref 1.6–3.1)
POTASSIUM SERPL-SCNC: 2.8 MMOL/L (ref 3.4–5.1)
SODIUM SERPL-SCNC: 136 MMOL/L (ref 133–145)

## 2024-09-16 PROCEDURE — 94640 AIRWAY INHALATION TREATMENT: CPT

## 2024-09-16 PROCEDURE — 84132 ASSAY OF SERUM POTASSIUM: CPT | Performed by: NURSE PRACTITIONER

## 2024-09-16 PROCEDURE — 36415 COLL VENOUS BLD VENIPUNCTURE: CPT | Performed by: NURSE PRACTITIONER

## 2024-09-16 PROCEDURE — 83735 ASSAY OF MAGNESIUM: CPT | Performed by: INTERNAL MEDICINE

## 2024-09-16 PROCEDURE — 9420000001 HC RT PATIENT EDUCATION 5 MIN

## 2024-09-16 PROCEDURE — 83735 ASSAY OF MAGNESIUM: CPT | Performed by: NURSE PRACTITIONER

## 2024-09-16 PROCEDURE — 2500000005 HC RX 250 GENERAL PHARMACY W/O HCPCS: Performed by: NURSE PRACTITIONER

## 2024-09-16 PROCEDURE — 36415 COLL VENOUS BLD VENIPUNCTURE: CPT | Performed by: INTERNAL MEDICINE

## 2024-09-16 PROCEDURE — 2500000004 HC RX 250 GENERAL PHARMACY W/ HCPCS (ALT 636 FOR OP/ED): Performed by: NURSE PRACTITIONER

## 2024-09-16 PROCEDURE — 2500000001 HC RX 250 WO HCPCS SELF ADMINISTERED DRUGS (ALT 637 FOR MEDICARE OP): Performed by: NURSE PRACTITIONER

## 2024-09-16 PROCEDURE — 1200000002 HC GENERAL ROOM WITH TELEMETRY DAILY

## 2024-09-16 PROCEDURE — 2500000002 HC RX 250 W HCPCS SELF ADMINISTERED DRUGS (ALT 637 FOR MEDICARE OP, ALT 636 FOR OP/ED): Performed by: NURSE PRACTITIONER

## 2024-09-16 PROCEDURE — 99231 SBSQ HOSP IP/OBS SF/LOW 25: CPT | Performed by: NURSE PRACTITIONER

## 2024-09-16 PROCEDURE — 85300 ANTITHROMBIN III ACTIVITY: CPT | Performed by: NURSE PRACTITIONER

## 2024-09-16 PROCEDURE — 97530 THERAPEUTIC ACTIVITIES: CPT | Mod: GO,CO

## 2024-09-16 PROCEDURE — RXMED WILLOW AMBULATORY MEDICATION CHARGE

## 2024-09-16 PROCEDURE — 2500000002 HC RX 250 W HCPCS SELF ADMINISTERED DRUGS (ALT 637 FOR MEDICARE OP, ALT 636 FOR OP/ED): Performed by: INTERNAL MEDICINE

## 2024-09-16 PROCEDURE — 80048 BASIC METABOLIC PNL TOTAL CA: CPT | Performed by: INTERNAL MEDICINE

## 2024-09-16 RX ORDER — IPRATROPIUM BROMIDE AND ALBUTEROL SULFATE 2.5; .5 MG/3ML; MG/3ML
3 SOLUTION RESPIRATORY (INHALATION)
Qty: 90 ML | Refills: 0 | Status: SHIPPED | OUTPATIENT
Start: 2024-09-16

## 2024-09-16 RX ORDER — ENOXAPARIN SODIUM 100 MG/ML
40 INJECTION SUBCUTANEOUS DAILY
Status: DISCONTINUED | OUTPATIENT
Start: 2024-09-16 | End: 2024-09-17 | Stop reason: HOSPADM

## 2024-09-16 RX ORDER — MAGNESIUM SULFATE HEPTAHYDRATE 40 MG/ML
2 INJECTION, SOLUTION INTRAVENOUS ONCE
Status: COMPLETED | OUTPATIENT
Start: 2024-09-16 | End: 2024-09-16

## 2024-09-16 RX ORDER — DEXAMETHASONE 4 MG/1
6 TABLET ORAL DAILY
Qty: 3 TABLET | Refills: 0 | Status: SHIPPED | OUTPATIENT
Start: 2024-09-17

## 2024-09-16 RX ORDER — POTASSIUM CITRATE 10 MEQ/1
30 TABLET, EXTENDED RELEASE ORAL ONCE
Status: COMPLETED | OUTPATIENT
Start: 2024-09-16 | End: 2024-09-16

## 2024-09-16 RX ORDER — METOPROLOL TARTRATE 25 MG/1
25 TABLET, FILM COATED ORAL 2 TIMES DAILY
Qty: 60 TABLET | Refills: 0 | Status: SHIPPED | OUTPATIENT
Start: 2024-09-16

## 2024-09-16 ASSESSMENT — COGNITIVE AND FUNCTIONAL STATUS - GENERAL
TOILETING: A LOT
DRESSING REGULAR LOWER BODY CLOTHING: A LOT
TOILETING: A LITTLE
DRESSING REGULAR LOWER BODY CLOTHING: A LOT
CLIMB 3 TO 5 STEPS WITH RAILING: A LOT
MOVING FROM LYING ON BACK TO SITTING ON SIDE OF FLAT BED WITH BEDRAILS: A LITTLE
DAILY ACTIVITIY SCORE: 18
DRESSING REGULAR LOWER BODY CLOTHING: A LOT
DAILY ACTIVITIY SCORE: 15
TURNING FROM BACK TO SIDE WHILE IN FLAT BAD: A LITTLE
TOILETING: A LITTLE
DRESSING REGULAR UPPER BODY CLOTHING: A LITTLE
DAILY ACTIVITIY SCORE: 18
STANDING UP FROM CHAIR USING ARMS: A LITTLE
MOVING TO AND FROM BED TO CHAIR: A LITTLE
DRESSING REGULAR UPPER BODY CLOTHING: A LITTLE
DAILY ACTIVITIY SCORE: 19
MOVING TO AND FROM BED TO CHAIR: A LITTLE
HELP NEEDED FOR BATHING: A LITTLE
CLIMB 3 TO 5 STEPS WITH RAILING: A LOT
DRESSING REGULAR UPPER BODY CLOTHING: A LITTLE
MOVING FROM LYING ON BACK TO SITTING ON SIDE OF FLAT BED WITH BEDRAILS: A LITTLE
HELP NEEDED FOR BATHING: A LITTLE
EATING MEALS: A LITTLE
WALKING IN HOSPITAL ROOM: A LOT
TURNING FROM BACK TO SIDE WHILE IN FLAT BAD: A LITTLE
TURNING FROM BACK TO SIDE WHILE IN FLAT BAD: A LITTLE
DRESSING REGULAR LOWER BODY CLOTHING: A LITTLE
PERSONAL GROOMING: A LITTLE
MOBILITY SCORE: 16
TOILETING: A LITTLE
MOBILITY SCORE: 16
STANDING UP FROM CHAIR USING ARMS: A LITTLE
MOVING FROM LYING ON BACK TO SITTING ON SIDE OF FLAT BED WITH BEDRAILS: A LITTLE
MOBILITY SCORE: 16
WALKING IN HOSPITAL ROOM: A LOT
MOVING TO AND FROM BED TO CHAIR: A LITTLE
HELP NEEDED FOR BATHING: A LITTLE
STANDING UP FROM CHAIR USING ARMS: A LITTLE
HELP NEEDED FOR BATHING: A LOT
PERSONAL GROOMING: A LITTLE
WALKING IN HOSPITAL ROOM: A LOT
CLIMB 3 TO 5 STEPS WITH RAILING: A LOT
DRESSING REGULAR UPPER BODY CLOTHING: A LITTLE
PERSONAL GROOMING: A LITTLE
PERSONAL GROOMING: A LITTLE

## 2024-09-16 ASSESSMENT — PAIN SCALES - GENERAL
PAINLEVEL_OUTOF10: 0 - NO PAIN

## 2024-09-16 NOTE — PROGRESS NOTES
Nils Hogan is a 54 y.o. male on day 5 of admission presenting with Malaise and fatigue.    Subjective   Interval History:   Room not entered-limit exposure   Patient observed, lying in bed, not in any acute distress  Patient discussed with nurse-on room air  Remains afebrile       Review of Systems    Objective   Range of Vitals (last 24 hours)  Heart Rate:  [68]   Temp:  [36 °C (96.8 °F)-36.6 °C (97.8 °F)]   Resp:  [18]   BP: ()/(56-74)   SpO2:  [94 %]   Daily Weight  09/12/24 : 72.5 kg (159 lb 13.3 oz)    Body mass index is 24.31 kg/m².    Physical Exam  Stable vitals  Resting comfortably    Antibiotics  cefTRIAXone - 1 gram/50 mL  DOXYCYCLINE  MG  ML D5W (ADV/MBP)    Relevant Results  Labs  Results from last 72 hours   Lab Units 09/14/24  0557   WBC AUTO x10*3/uL 7.6   HEMOGLOBIN g/dL 12.8*   HEMATOCRIT % 36.0*   PLATELETS AUTO x10*3/uL 90*     Results from last 72 hours   Lab Units 09/14/24  0557   SODIUM mmol/L 139   POTASSIUM mmol/L 3.1*   CHLORIDE mmol/L 101   CO2 mmol/L 29   BUN mg/dL 7*   CREATININE mg/dL 0.40   GLUCOSE mg/dL 119*   CALCIUM mg/dL 7.8*   ANION GAP mmol/L 9   EGFR mL/min/1.73m*2 >90     Results from last 72 hours   Lab Units 09/14/24  0557   ALK PHOS U/L 168*   BILIRUBIN TOTAL mg/dL 3.8*   PROTEIN TOTAL g/dL 6.7   ALT U/L 29   AST U/L 105*   ALBUMIN g/dL 2.1*     Estimated Creatinine Clearance: 125 mL/min (by C-G formula based on SCr of 0.4 mg/dL).  CRP   Date Value Ref Range Status   03/27/2019 1.6 0 - 2.0 MG/DL Final     Comment:     Performed at [address] [address]     Microbiology  Reviewed  Imaging  ECG 12 lead    Result Date: 9/15/2024  Normal sinus rhythm Normal ECG Confirmed by Danielle Perez ([id number]) on 9/15/2024 10:31:16 PM    XR chest 2 views    Result Date: 9/14/2024  Interpreted By:  Tamir Luevano, STUDY: XR CHEST 2 VIEWS; 9/14/2024 7:54 am   INDICATION: Signs/Symptoms:infiltrates   COMPARISON: 09/11/2024.   ACCESSION NUMBER(S):  [id number]   ORDERING CLINICIAN: ALYX CAMILO   TECHNIQUE: Number of films: Two-view radiographs of the chest were obtained.   FINDINGS: The heart and mediastinum are normal. There is left pleural effusion and left basilar infiltrates/atelectatic changes; allowing for differences in technique there is worsening since the prior exam. The right lung is clear. No pneumothorax is seen. The osseous structures are unremarkable.       Worsening left effusion and left basilar infiltrates/atelectatic changes; correlate clinically and follow-up as needed.   Signed by: Tamir Luevano 9/14/2024 1:01 PM Dictation workstation:   [id number]    US gallbladder    Result Date: 9/11/2024  Interpreted By:  Quentin Jung, STUDY: US GALLBLADDER;  9/11/2024 9:20 pm   INDICATION: Signs/Symptoms:Transaminitis, hyperbilirubinemia, abdominal pain/malaise.     COMPARISON: None.   ACCESSION NUMBER(S): [id number]   ORDERING CLINICIAN: DILLON KUNZ   TECHNIQUE: Multiple sonographic grayscale and color images of the right upper quadrant were performed.   FINDINGS: The examination is limited secondary to shadowing from overlying bowel gas.   There is heterogeneous echotexture of the liver and nodular contour. The patient is status post Whipple surgery with cholecystectomy and hepaticojejunostomy. Nonspecific cystic structure in the region of the left hepatic lobe. There is abdominal ascites.   No hydronephrosis right kidney.       Limited examination secondary to shadowing from overlying bowel gas.   Liver cirrhosis and abdominal ascites.   Postsurgical change of Whipple surgery with cholecystectomy and hepaticojejunostomy. Please note evaluation is limited on the ultrasound examination secondary to the shadowing and described postsurgical change, and if there is persistent concern for underlying pathology, follow-up contrast enhanced CT is recommended for further evaluation.   MACRO: None   Signed by: Quentin Jung 9/11/2024 9:43 PM Dictation  workstation:   [id number]    CT abdomen pelvis wo IV contrast    Result Date: 9/11/2024  Interpreted By:  Ronaldo Link, STUDY: CT ABDOMEN PELVIS WO IV CONTRAST;  9/11/2024 5:57 pm   INDICATION: Signs/Symptoms:r/o incarceration of hernia.   COMPARISON: CT abdomen and pelvis 08/29/2024   ACCESSION NUMBER(S): [id number]   ORDERING CLINICIAN: DILLON KUNZ   TECHNIQUE: Axial CT of the abdomen and pelvis was performed without intravenous contrast with coronal and sagittal reconstructions.   FINDINGS: Lower chest: Grossly stable large left pleural effusion with adjacent atelectasis.   Liver: Cirrhosis. Stable 1.6 cm anterior right hepatic hypodense lesion (series 4, image 40).   Biliary: Stable trace pneumobilia related to hepaticojejunostomy. Cholecystectomy.   Spleen: No mass. Stable splenomegaly measuring 14 cm craniocaudally..   Pancreas: Status post Whipple. Limited evaluation of the presumed remnant pancreas which appears atrophic. No main duct dilation.   Adrenals: No mass.   Kidneys: No calculus or hydronephrosis.   GI tract: Status post Whipple. Apparent asymmetric rectal wall thickening measuring up to 1.9 cm, new since 08/29/2024. No bowel wall thickening of the remaining colon or small bowel. A few loops of mildly dilated small bowel within midline ventral hernia, not significantly changed. Colonic diverticulosis without acute inflammation.   Lymph nodes: Multiple prominent peripancreatic/periportal and mesenteric lymph nodes, likely reactive.   Mesentery/peritoneum: Stable moderate-size simple attenuation ascites. No free air or fluid collection.   Vasculature: Mild-to-moderate abdominal aortic atherosclerotic calcifications without aneurysmal dilation. Stable perihepatic, perisplenic and paraesophageal varices.   Pelvis: Stable moderate-size simple attenuation ascites. No free air or fluid collection. Stable moderate smooth circumferential bladder wall thickening with a posterolateral diverticulum,  likely related to chronic urinary outlet obstruction. Enlarged prostate measuring 4.4 cm in transverse dimension.   Bones/Soft tissues: Redemonstration of a large midline supraumbilical ventral hernia with a 10 cm hernia neck. There are multiple loops of normal caliber and mildly dilated small bowel loops and small amount of free fluid within the hernia sac, similar to prior CT. There is an additional small right lateral paraumbilical hernia with a 2 cm neck containing fluid. Tiny fat containing bilateral inguinal hernias. Grossly unchanged lower lumbar degenerative disc disease.       No definite evidence of acute ventral hernia incarceration. Grossly similar appearance of midline large wide-neck bowel containing supraumbilical and small right lateral narrow-neck fluid containing paraumbilical hernias.   Stable nonemergent findings as described.   MACRO: None   Signed by: Ronaldo Link 9/11/2024 6:35 PM Dictation workstation:   [id number]    XR chest 1 view    Result Date: 9/11/2024  Interpreted By:  Jim Gomes, STUDY: XR CHEST 1 VIEW;  9/11/2024 3:34 pm   INDICATION: Signs/Symptoms:malaise.     COMPARISON: 9/9/2024   ACCESSION NUMBER(S): [id number]   ORDERING CLINICIAN: DILLON KUNZ   FINDINGS: AP portable view of the chest is obtained.  Limited exam due to portable nature. Magnified cardiac silhouette. Moderately sized left-sided pleural effusion and infiltrate. This appears improved since the prior exam. Right lung is clear.       1. Improved left-sided pleural effusion and infiltrate with however moderate residual.       MACRO: None   Signed by: Jim Gomes 9/11/2024 4:12 PM Dictation workstation:   [id number]    XR humerus left    Result Date: 9/9/2024  Interpreted By:  Tamir Luevano, STUDY: XR HUMERUS LEFT; ;  9/9/2024 4:19 pm   INDICATION: Signs/Symptoms:injury/pain.   COMPARISON: None.   ACCESSION NUMBER(S): [id number]   ORDERING CLINICIAN: DILLON KUNZ   FINDINGS: No fractures or destructive  lesions are identified. The joint spaces and articular surfaces of the left shoulder and elbow are maintained. The alignment is anatomic. The soft tissues are unremarkable.       Normal radiographs of the left humerus.   Signed by: Tamir Luevano 9/9/2024 4:36 PM Dictation workstation:   [id number]    XR pelvis 1-2 views    Result Date: 9/9/2024  Interpreted By:  Tamir Luevano, STUDY: XR FEMUR LEFT 2+ VIEWS; XR PELVIS 1-2 VIEWS; ;  9/9/2024 4:19 pm   INDICATION: Signs/Symptoms:injury/pain; Signs/Symptoms:fall/injury.   COMPARISON: None.   ACCESSION NUMBER(S): [id number]; [id number]   ORDERING CLINICIAN: DILLON KUNZ   FINDINGS: Frontal view of the pelvis and additional four view radiographs of the left femur were obtained. There is no fracture or subluxation. Minimal degenerative changes of the hip joints and of the left knee joint are present, with small marginal osteophytes. Mild degenerative changes also involve the visualized lower lumbar spine. The alignment is anatomic. The soft tissues are unremarkable.       No acute fracture or subluxation.   Signed by: Tamir Luevano 9/9/2024 4:35 PM Dictation workstation:   [id number]    XR femur left 2+ views    Result Date: 9/9/2024  Interpreted By:  Tamir Luevano, STUDY: XR FEMUR LEFT 2+ VIEWS; XR PELVIS 1-2 VIEWS; ;  9/9/2024 4:19 pm   INDICATION: Signs/Symptoms:injury/pain; Signs/Symptoms:fall/injury.   COMPARISON: None.   ACCESSION NUMBER(S): [id number]; [id number]   ORDERING CLINICIAN: DILLON KUNZ   FINDINGS: Frontal view of the pelvis and additional four view radiographs of the left femur were obtained. There is no fracture or subluxation. Minimal degenerative changes of the hip joints and of the left knee joint are present, with small marginal osteophytes. Mild degenerative changes also involve the visualized lower lumbar spine. The alignment is anatomic. The soft tissues are unremarkable.       No acute fracture or subluxation.   Signed by: Tamir  Bassem 9/9/2024 4:35 PM Dictation workstation:   [id number]    XR chest 2 views    Result Date: 9/9/2024  Interpreted By:  Tamir Luevano, STUDY: XR CHEST 2 VIEWS; 9/9/2024 4:19 pm   INDICATION: Signs/Symptoms:injury/ pain   COMPARISON: 09/03/2024.   ACCESSION NUMBER(S): [id number]   ORDERING CLINICIAN: DILLON KUNZ   TECHNIQUE: Number of films: Two-view radiographs of the chest were obtained.   FINDINGS: The heart and mediastinum are normal. There is worsening left pleural effusion with possible associated atelectatic changes. No pneumothorax is seen. Degenerative changes involve the spine.       Worsening left pleural effusion.   Signed by: Tamir Luevano 9/9/2024 4:32 PM Dictation workstation:   [id number]    ECG 12 Lead    Result Date: 9/5/2024  Normal sinus rhythm Low voltage QRS RSR' or QR pattern in V1 suggests right ventricular conduction delay Prolonged QT Abnormal ECG When compared with ECG of 08-JUN-2024 01:18, T wave amplitude has decreased in Anterior leads Confirmed by Victorino Ortiz ([id number]) on 9/5/2024 9:57:49 AM    Transthoracic Echo (TTE) Limited    Result Date: 9/4/2024           [place] [address]            Phone [phone] TRANSTHORACIC ECHOCARDIOGRAM REPORT Patient Name:     HARPER Ferguson Physician:   [id number] Milan Nicole DO Study Date:       9/4/2024             Ordering Provider:   [id number] ALYX CAMILO MRN/PID:          [id number]             Fellow: Accession#:       [id number]         Nurse: Date of           1970 / 54 years Sonographer:         Darnell Garnica RDCS Birth/Age: Gender:           M                    Additional Staff: Height:           162.56 cm            Admit Date: Weight:           71.67 kg             Admission Status:    Inpatient - Routine BSA / BMI:        1.77 m2 / 27.12       Department Location: [facility]                   kg/m2 Blood Pressure: 107 /78 mmHg Study Type:    TRANSTHORACIC ECHO (TTE) LIMITED Diagnosis/ICD: Shortness of breath-R06.02 Indication:    SOB CPT Codes:     Echo Limited-11699 Patient History: Smoker:            Former. Pertinent History: HTN, Hyperlipidemia, CVA and Dyspnea. Study Detail: The following Echo studies were performed: 2D. Technically               challenging study due to body habitus and patient lying in supine               position.  PHYSICIAN INTERPRETATION: Left Ventricle: Left ventricular ejection fraction is normal, by visual estimate at 65-70%. There are no regional wall motion abnormalities. The left ventricular cavity size is normal. Spectral Doppler shows a normal pattern of left ventricular diastolic filling. Left Atrium: The left atrium is normal in size. Right Ventricle: The right ventricle is normal in size. There is normal right ventricular global systolic function. Right Atrium: The right atrium is normal in size. Aortic Valve: The aortic valve is trileaflet. There is no evidence of aortic valve regurgitation. Mitral Valve: The mitral valve is normal in structure. Mitral valve regurgitation was not assessed. Tricuspid Valve: The tricuspid valve is structurally normal. Tricuspid regurgitation was not assessed. Pulmonic Valve: The pulmonic valve is not well visualized. The pulmonic valve regurgitation was not well visualized. Pericardium: There is no pericardial effusion noted. Aorta: The aortic root is normal.  CONCLUSIONS:  1. Left ventricular ejection fraction is normal, by visual estimate at 65-70%.  2. There is normal right ventricular global systolic function. QUANTITATIVE DATA SUMMARY: 2D MEASUREMENTS:                          Normal Ranges: IVSd:          0.92 cm   (0.6-1.1cm) LVPWd:         0.94 cm   (0.6-1.1cm) LVIDd:         3.58 cm   (3.9-5.9cm) LVIDs:         2.44 cm LV Mass Index: 54.0 g/m2 LV % FS        31.8 % LV  SYSTOLIC FUNCTION BY 2D PLANIMETRY (MOD):                      Normal Ranges: EF-A4C View:    74 % (>=55%) EF-A2C View:    60 % EF-Biplane:     70 % EF-Visual:      68 % LV EF Reported: 68 %  [id number] Nicole DO Electronically signed on 9/4/2024 at 2:02:24 PM  ** Final **     US thoracentesis    Result Date: 9/4/2024  Interpreted By:  Virgilio Davila, STUDY: US THORACENTESIS; 9/3/2024 10:42 am   INDICATION: Left pleural effusion.   COMPARISON: None   ACCESSION NUMBER(S): [id number]   ORDERING CLINICIAN: ALYX CAMILO   TECHNIQUE: Informed consent obtained. Patient positionedsitting upright. Skin prepped, draped and anesthetized. Under ultrasound guidance, a centesis catheter/needle was advanced into leftpleural cavity.   FINDINGS: A total of 1.3 L of clear turner fluid was aspirated. The patient tolerated the procedure well.       Ultrasound-guided left thoracentesis.   Signed by: Virgilio Davila 9/4/2024 12:25 PM Dictation workstation:   [id number]    XR chest 1 view    Result Date: 9/3/2024  Interpreted By:  Oralia Jackman, STUDY: XR CHEST 1 VIEW 9/3/2024 2:31 pm   INDICATION: Status post thoracentesis   COMPARISON: 08/29/2024   ACCESSION NUMBER(S): [id number]   ORDERING CLINICIAN: TASIA SEVERINO   TECHNIQUE: AP semi-erect view of the chest at bedside   FINDINGS: Ultrasound-guided left thoracentesis has been performed with considerable decrease in size of the left pleural effusion. No pneumothorax is seen.       No pneumothorax following ultrasound-guided thoracentesis on the left. There is near complete evacuation of the left pleural effusion.   Signed by: Oralia Jackman 9/3/2024 4:09 PM Dictation workstation:   [id number]    Transthoracic Echo (TTE) Complete    Result Date: 9/3/2024           [place] [id number]            Phone [phone] TRANSTHORACIC ECHOCARDIOGRAM REPORT Patient Name:     HARPER Ferguson Physician:   [id number] Nicole                                                              DO Study Date:       9/3/2024             Ordering Provider:   [id number] TASIA SEVERINO MRN/PID:          [id number]             Fellow: Accession#:       [id number]         Nurse: Date of           1970 / 54 years Sonographer:         Carri Alcala Birth/Age: Gender:           M                    Additional Staff: Height:           162.56 cm            Admit Date: Weight:           69.85 kg             Admission Status:    Inpatient - Routine BSA / BMI:        1.75 m2 / 26.43      Department Location: [facility]                   kg/m2 Blood Pressure: 110 /59 mmHg Study Type:    TRANSTHORACIC ECHO (TTE) COMPLETE Diagnosis/ICD: Other secondary hypertension-I15.8 CPT Codes:     Echo Complete w Full Doppler-96817  Study Detail: The following Echo studies were performed: 2D, M-Mode, Doppler,               color flow, Strain and 3D.  PHYSICIAN INTERPRETATION: Left Ventricle: Left ventricular ejection fraction is normal, by visual estimate at 60-65%. There are no regional wall motion abnormalities. The left ventricular cavity size is normal. Left Ventricular Global Longitudinal Strain - -22.5 %. Spectral Doppler shows an impaired relaxation pattern of left ventricular diastolic filling. Left Atrium: The left atrium is normal in size. Right Ventricle: The right ventricle is normal in size. There is normal right ventricular global systolic function. Right Atrium: The right atrium is normal in size. Aortic Valve: The aortic valve is trileaflet. The aortic valve dimensionless index is 1.01. There is no evidence of aortic valve regurgitation. The peak instantaneous gradient of the aortic valve is 6.1 mmHg. The mean gradient of the aortic valve is 4.0 mmHg. Mitral Valve: The mitral valve is normal in structure. There is trace mitral valve regurgitation. Tricuspid Valve: The tricuspid valve is structurally normal.  There is trace tricuspid regurgitation. Pulmonic Valve: The pulmonic valve is not well visualized. The pulmonic valve regurgitation was not well visualized. Pericardium: There is no pericardial effusion noted. Aorta: The aortic root is normal.  CONCLUSIONS:  1. Left ventricular ejection fraction is normal, by visual estimate at 60-65%.  2. Spectral Doppler shows an impaired relaxation pattern of left ventricular diastolic filling.  3. There is normal right ventricular global systolic function. QUANTITATIVE DATA SUMMARY: 2D MEASUREMENTS:                          Normal Ranges: IVSd:          1.08 cm   (0.6-1.1cm) LVPWd:         0.74 cm   (0.6-1.1cm) LVIDd:         4.37 cm   (3.9-5.9cm) LVIDs:         2.67 cm LV Mass Index: 73.5 g/m2 LV % FS        38.9 % LA VOLUME:                              Normal Ranges: LA Volume Index:  38.3 ml/m2 LA Vol A4C:       63.4 ml LA Vol A2C:       70.6 ml LA Vol Index BSA: 38.3 ml/m2 RA VOLUME BY A/L METHOD:                       Normal Ranges: RA Area A4C: 10.1 cm2 AORTA MEASUREMENTS:                      Normal Ranges: Ao Sinus, d: 3.20 cm (2.1-3.5cm) Ao STJ, d:   2.09 cm (1.7-3.4cm) Asc Ao, d:   2.80 cm (2.1-3.4cm) LV SYSTOLIC FUNCTION BY 2D PLANIMETRY (MOD):                                         Normal Ranges: EF-A4C View:                      65 %  (>=55%) EF-A2C View:                      68 % EF-Biplane:                       67 % EF-Visual:                        63 % EF-Auto:                          66 % LV EF Reported:                   63 % Global Longitudinal Strain (GLS): -22 % LV DIASTOLIC FUNCTION:                         Normal Ranges: MV Peak E:    0.90 m/s  (0.7-1.2 m/s) MV Peak A:    0.61 m/s  (0.42-0.7 m/s) E/A Ratio:    1.48      (1.0-2.2) MV e'         0.101 m/s (>8.0) MV lateral e' 0.11 m/s MV medial e'  0.09 m/s E/e' Ratio:   8.93      (<8.0) MITRAL VALVE:                 Normal Ranges: MV DT: 220 msec (150-240msec) AORTIC VALVE:                                    Normal Ranges: AoV Vmax:                1.23 m/s (<=1.7m/s) AoV Peak P.1 mmHg (<20mmHg) AoV Mean P.0 mmHg (1.7-11.5mmHg) LVOT Max Vickey:            1.23 m/s (<=1.1m/s) AoV VTI:                 28.20 cm (18-25cm) LVOT VTI:                28.50 cm LVOT Diameter:           2.00 cm  (1.8-2.4cm) AoV Area, VTI:           3.18 cm2 (2.5-5.5cm2) AoV Area,Vmax:           3.14 cm2 (2.5-4.5cm2) AoV Dimensionless Index: 1.01  RIGHT VENTRICLE: RV Basal 2.78 cm TAPSE:   29.1 mm RV s'    0.12 m/s  [id number] Milan Lebronbeau TY Electronically signed on 9/3/2024 at 2:01:47 PM  ** Final **     US thoracentesis    Result Date: 9/3/2024  Interpreted By:  Francisco Javier Davila, STUDY: US THORACENTESIS; 2024 2:46 pm   INDICATION: Left pleural effusion   COMPARISON: None   ACCESSION NUMBER(S): [id number]   ORDERING CLINICIAN: FRANCISCO JAVIER DAVILA   TECHNIQUE: Informed consent obtained. Patient positionedsitting upright. Skin prepped, draped and anesthetized. Under ultrasound guidance, a centesis catheter/needle was advanced into leftpleural cavity.   FINDINGS: A total of 1.7 L of clear yellow fluid was aspirated. A sample was sent for analysis. The patient tolerated the procedure well.       Ultrasound-guided left thoracentesis.   Signed by: Francisco Javier Davila 9/3/2024 10:00 AM Dictation workstation:   [id number]    US guided abdominal paracentesis    Result Date: 9/3/2024  Interpreted By:  Francisco Javier Davila, STUDY: US GUIDED ABDOMINAL PARACENTESIS; 2024 2:46 pm   INDICATION: Signs/Symptoms:ascites.   COMPARISON: None.   ACCESSION NUMBER(S): [id number]   ORDERING CLINICIAN: FRANCISCO JAVIER DAVILA   TECHNIQUE: Ultrasound-guided paracentesis   FINDINGS: Informed consent obtained. Patient positioned supine. Right lower quadrant prepped, draped and anesthetized. Under ultrasound guidance, 8 Kosovan centesis sheath needle inserted into peritoneal cavity. 1.5 Lof clear yellowfluid aspirated. Patient tolerated procedure well        Ultrasound-guided paracentesis.   Signed by: Virgilio Davila 9/3/2024 9:59 AM Dictation workstation:   [id number]    CT chest wo IV contrast    Result Date: 9/2/2024  Interpreted By:  Ai Gil, STUDY: CT CHEST WO IV CONTRAST;  9/2/2024 10:18 am   INDICATION: Signs/Symptoms:hypoxia.     COMPARISON: Chest x-ray 08/29/2024 and CT abdomen pelvis 08/29/2024   ACCESSION NUMBER(S): [id number]   ORDERING CLINICIAN: ALYX CAMILO   TECHNIQUE: Helical data acquisition of the chest was obtained  without IV contrast material.  Images were reformatted in axial, coronal, and sagittal planes. Images extend through the abdomen to the level of the iliac crest.   FINDINGS: LUNGS AND AIRWAYS: The trachea and central airways are patent. No endobronchial lesion.   Large left pleural effusion is only minimally decreased from the prior chest x-ray, with interval thoracentesis removing 1.7 L of fluid on 08/30/2024. Compressive atelectasis involves most of the left lower lobe. Some mild patchy ground-glass opacities are present in the left upper lobe.   There is minimal pleural fluid present on the right. A small peripheral infiltrate is present posterolateral aspect of the right middle lobe and there is some minimal patchy ground-glass opacity through the right lung.   MEDIASTINUM AND HANANE, LOWER NECK AND AXILLA: The visualized thyroid gland is within normal limits.   No evidence of thoracic lymphadenopathy by CT criteria.   Esophagus appears within normal limits as seen.   HEART AND VESSELS: The thoracic aorta is of normal course and caliber with mild patchy vascular calcifications.   Main pulmonary artery and its branches are normal in caliber.   Patchy coronary artery calcifications are seen. The study is not optimized for evaluation of coronary arteries.   The cardiac chambers are not enlarged.   No evidence of pericardial effusion.   ABDOMEN: Liver is unchanged in size and contour with prominence of the left lobe and  heterogeneity. Pneumobilia is unchanged. Gallbladder is absent.   Patient is status post Whipple procedure. The remaining portion of the pancreas is severely atrophic. There is no inflammation or gross mass.   Spleen is mildly enlarged measuring about 13 x 13 x 7 cm.   Adrenal glands appear normal.   No hydronephrosis, stone or gross mass is noted involving the kidneys.   Included bowel loops are not dilated. The large ventral hernia is partially excluded from field of view and appears grossly unchanged. Ascites is similar in volume to the prior study. There is no gross pneumoperitoneum noted.   Aorta shows no aneurysmal dilatation. Vena cava appears normal in size.   CHEST WALL AND OSSEOUS STRUCTURES: No acute or suspicious osseous abnormality is noted. There is mild bilateral gynecomastia.       1.  Large left pleural effusion with severe compressive atelectasis of the left lower lobe 2. Small patchy infiltrates in both lungs could represent pneumonia 3. Ascites is similar to the prior study. Once again there is a cirrhotic appearance of the liver and mild splenomegaly without change. With regard to the vague left hepatic lobe abnormality on the prior study, this is even more nondescript on the current exam. 4. Patient is status post Whipple procedure 5. Large midline ventral hernia containing bowel is incompletely included in the field of view but grossly unchanged   MACRO: None   Signed by: Ai Gil 9/2/2024 11:39 AM Dictation workstation:   [id number]    XR chest 1 view    Result Date: 8/29/2024  Interpreted By:  Holden Salinas, STUDY: XR CHEST 1 VIEW;  8/29/2024 6:04 pm   INDICATION: Signs/Symptoms:evaluate for pleural effusion seen on ct abd.   COMPARISON: Chest radiograph 06/08/2024   ACCESSION NUMBER(S): [id number]   ORDERING CLINICIAN: DILLON KUNZ   FINDINGS:     CARDIOMEDIASTINAL SILHOUETTE: Cardiomediastinal silhouette is stable in size and configuration.   LUNGS: Large left-sided pleural  effusion with left lower lung airspace opacification.   ABDOMEN: No remarkable upper abdominal findings.   BONES: No acute osseous changes.       1.  Large left-sided pleural effusion.     Signed by: Holden Salinas 8/29/2024 6:13 PM Dictation workstation:   [id number]    CT abdomen pelvis wo IV contrast    Result Date: 8/29/2024  Interpreted By:  Tamir Luevano, STUDY: CT ABDOMEN PELVIS WO IV CONTRAST; 8/29/2024 3:42 pm   INDICATION: Signs/Symptoms:abdominal pain, hx of ascites   COMPARISON: 06/05/2024.   ACCESSION NUMBER(S): [id number]   ORDERING CLINICIAN: SURINDER LAZO   TECHNIQUE: Spiral axial unenhanced images were obtained from the upper abdomen to the symphysis pubis. Oral contrast was not administered.   All CT examinations are performed with one or more of the following dose reduction techniques: Automated Exposure Control, adjustment of mA and/or kV according to patient size, or use of iterative reconstruction techniques.   FINDINGS: Lung bases: There is interval significant worsening of large left pleural effusion with left basilar atelectatic changes/infiltrates. Liver: Heterogenous cirrhotic liver with vague hypodensity again noted in the left lobe, similar to prior exam. Mild pneumobilia again seen. Associated worsening ascites in the abdomen and pelvis. Spleen: Splenomegaly again noted, without focal lesions. Pancreas: Previous Whipple's procedure, similar to prior exam. Associated prominent peripancreatic/periportal lymph nodes, similar to prior exam. Adrenal glands: No focal lesions. The kidneys are normal in size and position. There are no renal calculi or hydronephrosis. No abnormal calcifications are seen within the course of the ureters or within the partially distended bladder. Pelvic reproductive organs: Small calcifications again seen in the prostate gland.   Several colonic diverticula are again seen, without acute diverticulitis. There is again large ventral hernia containing several  small bowel loops, without strangulation or bowel obstruction. Atherosclerotic calcifications involve the aorta and its branches, without focal aneurysm. Degenerative changes involve the L5-S1 level of the lumbar spine.       1. Interval significant worsening of large left pleural effusion left basilar infiltrates/atelectasis; correlate clinically and follow-up as needed. 2. Heterogenous cirrhotic liver with pneumobilia and stable hypodense lesion in the left lobe. Splenomegaly. Worsening ascites in the abdomen and pelvis. 3.  previous Whipple's procedure. Associated prominent peripancreatic/periportal lymph nodes. 4. Large ventral hernia again seen, containing several small bowel loops, without strangulation or bowel obstruction. Colonic diverticulosis without acute diverticulitis.   Signed by: Tamir Luevano 8/29/2024 4:15 PM Dictation workstation:   [id number]     Assessment/Plan   Acute hypoxic respiratory failure, resolved, on room air  Coronavirus infection  History of cirrhosis with ascites -history of paracentesis  Allergy to zosyn-tolerating ceftriaxone  Ventral hernia, management by General Surgery      Continue ceftriaxone, while inpatient-discontinue upon discharge  Continue remdesivir-5 days total-completed  Continue dexamethasone-total of 10 days  Oxygen as needed  Droplet plus precaution  Monitor WBC and temperature  Monitor  liver enzymes  Supportive care  Discharge planning    Dario Schroeder MD

## 2024-09-16 NOTE — PROGRESS NOTES
Physical Therapy                 Therapy Communication Note    Patient Name: Nils Hogan  MRN: [id number]  Department: [address]  Room: [facility]9-  Today's Date: 9/16/2024     Discipline: Physical Therapy    Missed Visit Reason: Missed Visit Reason: Other (Comment) (pt with very low magnesium and altered potassium levels. pt has been somewhat agitated and wanted to go home this date. treatment deferred.)    Missed Time: Attempt    Comment: no PT treatment this date

## 2024-09-16 NOTE — PROGRESS NOTES
"Nils Hogan is a 54 y.o. male on day 5 of admission presenting with Malaise and fatigue.    Subjective   Tolerating diet. Having bowel movements. Desats when off of wall O2. No n/v. No abdominal pains.        Objective     Physical Exam  Constitutional:       Appearance: Normal appearance.   HENT:      Head: Normocephalic and atraumatic.      Right Ear: Tympanic membrane normal.      Nose: Nose normal.      Mouth/Throat:      Mouth: Mucous membranes are moist.   Eyes:      Pupils: Pupils are equal, round, and reactive to light.   Cardiovascular:      Rate and Rhythm: Normal rate and regular rhythm.      Pulses: Normal pulses.   Pulmonary:      Effort: Pulmonary effort is normal.      Breath sounds: Normal breath sounds.   Abdominal:      General: Bowel sounds are normal. There is no distension.      Tenderness: There is no abdominal tenderness. There is no guarding.      Hernia: A hernia is present.      Comments: Significant incisional abdominal hernia without erosion or ulceration. Non tender.    Genitourinary:     Rectum: Normal.   Musculoskeletal:         General: Normal range of motion.   Skin:     General: Skin is warm and dry.   Neurological:      General: No focal deficit present.      Mental Status: He is alert.   Psychiatric:         Mood and Affect: Mood normal.         Behavior: Behavior normal.         Last Recorded Vitals  Blood pressure 93/56, pulse 68, temperature 36.4 °C (97.5 °F), temperature source Oral, resp. rate 17, height 1.727 m (5' 7.99\"), weight 72.5 kg (159 lb 13.3 oz), SpO2 94%.  Intake/Output last 3 Shifts:  I/O last 3 completed shifts:  In: 1190 (16.4 mL/kg) [P.O.:940; IV Piggyback:250]  Out: 2725 (37.6 mL/kg) [Urine:2725 (1 mL/kg/hr)]  Weight: 72.5 kg     Relevant Results       Lab Results   Component Value Date    GLUCOSE 218 (H) 09/16/2024    CALCIUM 7.5 (L) 09/16/2024     09/16/2024    K 2.8 (LL) 09/16/2024    CO2 28 09/16/2024    CL 96 (L) 09/16/2024    BUN 10 09/16/2024    " CREATININE 0.40 09/16/2024     Lab Results   Component Value Date    WBC 7.6 09/14/2024    HGB 12.8 (L) 09/14/2024    HCT 36.0 (L) 09/14/2024    MCV 98 09/14/2024    PLT 90 (L) 09/14/2024                         Assessment/Plan   Assessment & Plan  Malaise and fatigue    Hypokalemia    Urinary tract infection    Anemia    Alcoholic cirrhosis of liver with ascites (Multi)    Hypertension    9/16/24: Discussed with Dr. Kramer, will sign off at this juncture. Recommended to follow up with General Surgery group at [facility]. This patient is not a candidate for non urgent surgery at this hospital. Pt shall not have non urgent surgery while recovery from COVID (coughing and re-herniation risk). DVT and GI prophylaxis. Ambulate. Wean o2 as appropriate.        I spent 15 minutes in the professional and overall care of this patient.      Lily Askew, APRN-CNP

## 2024-09-16 NOTE — PROGRESS NOTES
Pt with dc order in since 9/15. [facility] reached out to attending via secure chat to make them aware external referral for homecare is needed otherwise pt will not receive these services.      09/16/24 1017   Discharge Planning   Expected Discharge Disposition Home Health

## 2024-09-16 NOTE — SIGNIFICANT EVENT
The pt walked in the room on 6L n/c and did not meet the qualifications to be sent home on home 02 yet due to requiring over 6L n/c during ambulation for less than 6 minutes. Study preformed by SHELLEY Damon RRT. Bedside RN Joy FELICIANO and Dr. Flores notified.

## 2024-09-16 NOTE — PROGRESS NOTES
Occupational Therapy    OT Treatment    Patient Name: Nils Hogan  MRN: [id number]  Department: [address]  Room: [facility]45-  Today's Date: 9/16/2024  Time Calculation  Start Time: 1045  Stop Time: 1100  Time Calculation (min): 15 min        Assessment:  OT Assessment: Limited activity this date d/t agitation, with increased education provided throughout session upon homegoing. Pt would benefit from continued skilled OT services to improve strength, balance, and functional tolerance to increase independence with ADL tasks  End of Session Communication: Bedside nurse  End of Session Patient Position: Bed, 3 rail up, Alarm off, not on at start of session  OT Assessment Results: Decreased ADL status, Decreased endurance, Decreased functional mobility  Plan:  Treatment Interventions: ADL retraining, Functional transfer training, UE strengthening/ROM, Endurance training, Equipment evaluation/education, Compensatory technique education  OT Frequency: 3 times per week  OT Discharge Recommendations: Moderate intensity level of continued care  Equipment Recommended upon Discharge: Wheeled walker, Bedside commode  OT Recommended Transfer Status: Assist of 2, Moderate assist  OT - OK to Discharge: Yes  Treatment Interventions: ADL retraining, Functional transfer training, UE strengthening/ROM, Endurance training, Equipment evaluation/education, Compensatory technique education    Subjective   Previous Visit Info:  OT Last Visit  OT Received On: 09/16/24  General:  General  Prior to Session Communication: Bedside nurse  Patient Position Received: Bed, 3 rail up, Alarm off, not on at start of session  General Comment: Cleared for therapy per RN. Pt supine in bed upon arrival, mildly agitated and declining OOB activity with educational session provided  Precautions:  Medical Precautions: Fall precautions, Infection precautions (COVID+)    Pain:  Pain Assessment  Pain Assessment: 0-10  0-10 (Numeric) Pain Score: 0 - No  pain    Objective    Cognition:  Cognition  Overall Cognitive Status: Within Functional Limits  Cognition Comments: agitated this date d/t discharge planning. insisting he must get home to feed dog with comfort provided    Activities of Daily Living:    Grooming  Grooming Comments: declining engagement in grooming tasks this date    Bed Mobility/Transfers:    Transfers  Transfer: No (declining OOB activity this date despite encouragement)  Other Activity:  Other Activity Performed: Yes  Other Activity 1: educational session provided on importance of safety + energy conservation upon homegoing with pt expressing understanding and explaining how he completes ADL tasks at home with adaptive recommendations provided    Outcome Measures:[facility] Daily Activity  Putting on and taking off regular lower body clothing: A lot  Bathing (including washing, rinsing, drying): A lot  Putting on and taking off regular upper body clothing: A little  Toileting, which includes using toilet, bedpan or urinal: A lot  Taking care of personal grooming such as brushing teeth: A little  Eating Meals: A little  Daily Activity - Total Score: 15    Education Documentation  Body Mechanics, taught by LAUREANO Thapa at 9/16/2024 11:37 AM.  Learner: Patient  Readiness: Acceptance  Method: Explanation  Response: Verbalizes Understanding, Needs Reinforcement    ADL Training, taught by LAUREANO Thapa at 9/16/2024 11:37 AM.  Learner: Patient  Readiness: Acceptance  Method: Explanation  Response: Verbalizes Understanding, Needs Reinforcement    Education Comments  No comments found.      Problem: ADLs  Goal: Patient with complete upper body dressing with independent level of assistance donning and doffing all UE clothes while edge of bed   Outcome: Progressing  Goal: Patient with complete lower body dressing with modified independent level of assistance donning and doffing all LE clothes  with PRN adaptive equipment while edge of  bed   Outcome: Progressing  Goal: Patient will complete daily grooming tasks with modified independent level of assistance and PRN adaptive equipment while standing.  Outcome: Progressing  Goal: Patient will complete toileting including hygiene clothing management/hygiene with modified independent level of assistance and grab bars.  Outcome: Progressing     Problem: TRANSFERS  Goal: Patient will complete functional transfer to toilet with least restrictive device with modified independent level of assistance.  Outcome: Progressing

## 2024-09-16 NOTE — CARE PLAN
The patient's goals for the shift include      The clinical goals for the shift include maintain oxygenation above 90%    Over the shift, the patient did not make progress toward the following goals. Barriers to progression include limited mobility. Recommendations to address these barriers include encourage activity.

## 2024-09-17 ENCOUNTER — APPOINTMENT (OUTPATIENT)
Dept: RADIOLOGY | Facility: HOSPITAL | Age: 54
DRG: 177 | End: 2024-09-17
Payer: COMMERCIAL

## 2024-09-17 VITALS
HEART RATE: 80 BPM | DIASTOLIC BLOOD PRESSURE: 61 MMHG | WEIGHT: 160.27 LBS | BODY MASS INDEX: 24.29 KG/M2 | TEMPERATURE: 98.1 F | HEIGHT: 68 IN | SYSTOLIC BLOOD PRESSURE: 115 MMHG | RESPIRATION RATE: 17 BRPM | OXYGEN SATURATION: 94 %

## 2024-09-17 LAB
ALBUMIN SERPL-MCNC: 2 G/DL (ref 3.5–5)
ALP BLD-CCNC: 191 U/L (ref 35–125)
ALT SERPL-CCNC: 30 U/L (ref 5–40)
ANION GAP SERPL CALC-SCNC: 11 MMOL/L
AST SERPL-CCNC: 71 U/L (ref 5–40)
BILIRUB SERPL-MCNC: 3.3 MG/DL (ref 0.1–1.2)
BUN SERPL-MCNC: 9 MG/DL (ref 8–25)
CALCIUM SERPL-MCNC: 7.5 MG/DL (ref 8.5–10.4)
CHLORIDE SERPL-SCNC: 96 MMOL/L (ref 97–107)
CO2 SERPL-SCNC: 29 MMOL/L (ref 24–31)
CREAT SERPL-MCNC: 0.4 MG/DL (ref 0.4–1.6)
EGFRCR SERPLBLD CKD-EPI 2021: >90 ML/MIN/1.73M*2
ERYTHROCYTE [DISTWIDTH] IN BLOOD BY AUTOMATED COUNT: 15.7 % (ref 11.5–14.5)
FUNGUS SPEC CULT: NORMAL
FUNGUS SPEC FUNGUS STN: NORMAL
GLUCOSE SERPL-MCNC: 174 MG/DL (ref 65–99)
HCT VFR BLD AUTO: 35.7 % (ref 41–52)
HGB BLD-MCNC: 12.6 G/DL (ref 13.5–17.5)
HOLD SPECIMEN: NORMAL
MAGNESIUM SERPL-MCNC: 1.45 MG/DL (ref 1.6–2.4)
MCH RBC QN AUTO: 34.7 PG (ref 26–34)
MCHC RBC AUTO-ENTMCNC: 35.3 G/DL (ref 32–36)
MCV RBC AUTO: 98 FL (ref 80–100)
NRBC BLD-RTO: 0 /100 WBCS (ref 0–0)
PLATELET # BLD AUTO: 120 X10*3/UL (ref 150–450)
POTASSIUM SERPL-SCNC: 2.7 MMOL/L (ref 3.4–5.1)
POTASSIUM SERPL-SCNC: 2.8 MMOL/L (ref 3.4–5.1)
POTASSIUM SERPL-SCNC: 3.3 MMOL/L (ref 3.5–5.3)
PROT SERPL-MCNC: 6.4 G/DL (ref 5.9–7.9)
RBC # BLD AUTO: 3.63 X10*6/UL (ref 4.5–5.9)
SODIUM SERPL-SCNC: 136 MMOL/L (ref 133–145)
WBC # BLD AUTO: 7.9 X10*3/UL (ref 4.4–11.3)

## 2024-09-17 PROCEDURE — 97530 THERAPEUTIC ACTIVITIES: CPT | Mod: GO,CO

## 2024-09-17 PROCEDURE — 2500000001 HC RX 250 WO HCPCS SELF ADMINISTERED DRUGS (ALT 637 FOR MEDICARE OP): Performed by: NURSE PRACTITIONER

## 2024-09-17 PROCEDURE — 71250 CT THORAX DX C-: CPT

## 2024-09-17 PROCEDURE — 2500000005 HC RX 250 GENERAL PHARMACY W/O HCPCS: Performed by: NURSE PRACTITIONER

## 2024-09-17 PROCEDURE — 2500000002 HC RX 250 W HCPCS SELF ADMINISTERED DRUGS (ALT 637 FOR MEDICARE OP, ALT 636 FOR OP/ED): Performed by: NURSE PRACTITIONER

## 2024-09-17 PROCEDURE — 2500000004 HC RX 250 GENERAL PHARMACY W/ HCPCS (ALT 636 FOR OP/ED): Performed by: NURSE PRACTITIONER

## 2024-09-17 PROCEDURE — 84132 ASSAY OF SERUM POTASSIUM: CPT | Performed by: INTERNAL MEDICINE

## 2024-09-17 PROCEDURE — 36415 COLL VENOUS BLD VENIPUNCTURE: CPT | Performed by: NURSE PRACTITIONER

## 2024-09-17 PROCEDURE — 36415 COLL VENOUS BLD VENIPUNCTURE: CPT | Performed by: INTERNAL MEDICINE

## 2024-09-17 PROCEDURE — 3430000001 HC RX 343 DIAGNOSTIC RADIOPHARMACEUTICALS: Performed by: INTERNAL MEDICINE

## 2024-09-17 PROCEDURE — 2500000001 HC RX 250 WO HCPCS SELF ADMINISTERED DRUGS (ALT 637 FOR MEDICARE OP): Performed by: INTERNAL MEDICINE

## 2024-09-17 PROCEDURE — 2500000004 HC RX 250 GENERAL PHARMACY W/ HCPCS (ALT 636 FOR OP/ED): Performed by: INTERNAL MEDICINE

## 2024-09-17 PROCEDURE — 78803 RP LOCLZJ TUM SPECT 1 AREA: CPT

## 2024-09-17 PROCEDURE — 80053 COMPREHEN METABOLIC PANEL: CPT | Performed by: NURSE PRACTITIONER

## 2024-09-17 PROCEDURE — 78803 RP LOCLZJ TUM SPECT 1 AREA: CPT | Performed by: STUDENT IN AN ORGANIZED HEALTH CARE EDUCATION/TRAINING PROGRAM

## 2024-09-17 PROCEDURE — A9540 TC99M MAA: HCPCS | Performed by: INTERNAL MEDICINE

## 2024-09-17 PROCEDURE — 94640 AIRWAY INHALATION TREATMENT: CPT

## 2024-09-17 PROCEDURE — 71250 CT THORAX DX C-: CPT | Performed by: RADIOLOGY

## 2024-09-17 PROCEDURE — 85027 COMPLETE CBC AUTOMATED: CPT | Performed by: NURSE PRACTITIONER

## 2024-09-17 PROCEDURE — 99223 1ST HOSP IP/OBS HIGH 75: CPT | Performed by: INTERNAL MEDICINE

## 2024-09-17 PROCEDURE — 9420000001 HC RT PATIENT EDUCATION 5 MIN

## 2024-09-17 PROCEDURE — 83735 ASSAY OF MAGNESIUM: CPT | Performed by: INTERNAL MEDICINE

## 2024-09-17 PROCEDURE — 2500000002 HC RX 250 W HCPCS SELF ADMINISTERED DRUGS (ALT 637 FOR MEDICARE OP, ALT 636 FOR OP/ED): Performed by: INTERNAL MEDICINE

## 2024-09-17 RX ORDER — POTASSIUM CHLORIDE 20 MEQ/1
20 TABLET, EXTENDED RELEASE ORAL ONCE
Status: COMPLETED | OUTPATIENT
Start: 2024-09-17 | End: 2024-09-17

## 2024-09-17 RX ORDER — POTASSIUM CHLORIDE 1.5 G/1.58G
40 POWDER, FOR SOLUTION ORAL ONCE
Status: COMPLETED | OUTPATIENT
Start: 2024-09-17 | End: 2024-09-17

## 2024-09-17 RX ORDER — MAGNESIUM SULFATE HEPTAHYDRATE 40 MG/ML
2 INJECTION, SOLUTION INTRAVENOUS ONCE
Status: COMPLETED | OUTPATIENT
Start: 2024-09-17 | End: 2024-09-17

## 2024-09-17 RX ORDER — FUROSEMIDE 40 MG/1
40 TABLET ORAL DAILY
Qty: 30 TABLET | Refills: 0 | Status: SHIPPED | OUTPATIENT
Start: 2024-09-17

## 2024-09-17 RX ORDER — POTASSIUM CHLORIDE 20 MEQ/1
40 TABLET, EXTENDED RELEASE ORAL ONCE
Status: COMPLETED | OUTPATIENT
Start: 2024-09-17 | End: 2024-09-17

## 2024-09-17 ASSESSMENT — COGNITIVE AND FUNCTIONAL STATUS - GENERAL
EATING MEALS: A LITTLE
DAILY ACTIVITIY SCORE: 15
PERSONAL GROOMING: A LITTLE
HELP NEEDED FOR BATHING: A LOT
TOILETING: A LOT
DRESSING REGULAR UPPER BODY CLOTHING: A LITTLE
DRESSING REGULAR LOWER BODY CLOTHING: A LOT

## 2024-09-17 ASSESSMENT — PAIN SCALES - GENERAL: PAINLEVEL_OUTOF10: 0 - NO PAIN

## 2024-09-17 ASSESSMENT — PAIN - FUNCTIONAL ASSESSMENT: PAIN_FUNCTIONAL_ASSESSMENT: 0-10

## 2024-09-17 NOTE — CARE PLAN
Problem: Respiratory  Goal: Clear secretions with interventions this shift  Outcome: Progressing  Goal: Minimize anxiety/maximize coping throughout shift  Outcome: Progressing  Goal: Minimal/no exertional discomfort or dyspnea this shift  Outcome: Progressing  Goal: No signs of respiratory distress (eg. Use of accessory muscles. Peds grunting)  Outcome: Progressing  Goal: Patent airway maintained this shift  Outcome: Progressing  Goal: Verbalize decreased shortness of breath this shift  Outcome: Progressing  Goal: Wean oxygen to maintain O2 saturation per order/standard this shift  Outcome: Progressing  Goal: Increase self care and/or family involvement in next 24 hours  Outcome: Progressing

## 2024-09-17 NOTE — PROGRESS NOTES
Occupational Therapy    OT Treatment    Patient Name: Nils Hogan  MRN: [id number]  Department: [address]  Room: [address] [place] [address]  Today's Date: 9/17/2024  Time Calculation  Start Time: 1346  Stop Time: 1411  Time Calculation (min): 25 min        Assessment:  OT Assessment: Tolerated session well, demonstrating continued progression towards POC with increased time required for home education on energy conservation with pt expressing understanding. Pt would benefit from continued skilled OT services to improve strength, balance, and functional tolerance to increase independence with ADL tasks  End of Session Communication: Bedside nurse  End of Session Patient Position: Up in chair, Alarm on  OT Assessment Results: Decreased ADL status, Decreased endurance, Decreased functional mobility  Plan:  Treatment Interventions: ADL retraining, Functional transfer training, UE strengthening/ROM, Endurance training, Equipment evaluation/education, Compensatory technique education  OT Frequency: 3 times per week  OT Discharge Recommendations: Moderate intensity level of continued care  Equipment Recommended upon Discharge: Wheeled walker, Bedside commode  OT Recommended Transfer Status: Assist of 2, Moderate assist  OT - OK to Discharge: Yes  Treatment Interventions: ADL retraining, Functional transfer training, UE strengthening/ROM, Endurance training, Equipment evaluation/education, Compensatory technique education    Subjective   Previous Visit Info:  OT Last Visit  OT Received On: 09/17/24  General:  General  Prior to Session Communication: Bedside nurse  Patient Position Received: Bed, 3 rail up, Alarm on  General Comment: Cleared for therapy per RN. Pt supine in bed upon arrival and agreeable to tx  Precautions:  Medical Precautions: Fall precautions, Infection precautions (COVID+)    Vital Signs (Past 2hrs)        Date/Time Vitals Session Patient Position Pulse Resp SpO2 BP MAP (mmHg)    09/17/24 1346 --  --  --  --  95 %  --  --                    Pain:  Pain Assessment  Pain Assessment: 0-10  0-10 (Numeric) Pain Score: 0 - No pain    Objective    Cognition:  Cognition  Overall Cognitive Status: Within Functional Limits  Safety/Judgement: Exceptions to WFL    Activities of Daily Living: Feeding  Feeding Comments: s/u feeding task at end of session    Bed Mobility/Transfers: Bed Mobility  Bed Mobility: Yes  Bed Mobility 1  Bed Mobility 1: Supine to sitting  Level of Assistance 1: Minimum assistance  Bed Mobility Comments 1: assist for trunk elevation with increased time + effort to complete supine>sit with cues to scoot hips to EOB    Transfers  Transfer: Yes  Transfer 1  Technique 1: Sit to stand, Stand to sit  Transfer Device 1: Walker  Transfer Level of Assistance 1: Moderate assistance  Trials/Comments 1: sit>stand x2 with assist for trunk elevation and forward weightshifting with cues for proper hand placement and eccentric lowering to chair    Functional Mobility:  Functional Mobility  Functional Mobility Performed: Yes  Functional Mobility 1  Device 1: Rolling walker  Assistance 1: Minimum assistance  Comments 1: functional mobility short household distance x2 at FWW with cues for pacing and postural alignment in effort to improve safety and funcitonal tolerance, demonstrating fair- balance. Encouraged to take rest breaks if needed with pt expressing understanding.    Standing Balance:  Static Standing Balance  Static Standing-Level of Assistance: Minimum assistance  Static Standing-Comment/Number of Minutes: fair- balance during functional mobility task    Outcome Measures:[facility] Daily Activity  Putting on and taking off regular lower body clothing: A lot  Bathing (including washing, rinsing, drying): A lot  Putting on and taking off regular upper body clothing: A little  Toileting, which includes using toilet, bedpan or urinal: A lot  Taking care of personal grooming such as brushing teeth: A little  Eating Meals: A little  Daily  Activity - Total Score: 15    Education Documentation  Body Mechanics, taught by LAUREANO Thapa at 9/17/2024  3:04 PM.  Learner: Patient  Readiness: Acceptance  Method: Explanation  Response: Verbalizes Understanding    ADL Training, taught by LAUREANO Thapa at 9/17/2024  3:04 PM.  Learner: Patient  Readiness: Acceptance  Method: Explanation  Response: Verbalizes Understanding    Education Comments  No comments found.      Problem: ADLs  Goal: Patient with complete upper body dressing with independent level of assistance donning and doffing all UE clothes while edge of bed   9/17/2024 1504 by LAUREANO Thapa  Outcome: Progressing  9/17/2024 1503 by LAUREANO Thapa  Reactivated  Goal: Patient with complete lower body dressing with modified independent level of assistance donning and doffing all LE clothes  with PRN adaptive equipment while edge of bed   9/17/2024 1504 by LAUREANO Thapa  Outcome: Progressing  9/17/2024 1503 by LAUREANO Thapa  Reactivated  Goal: Patient will complete daily grooming tasks with modified independent level of assistance and PRN adaptive equipment while standing.  9/17/2024 1504 by LAUREANO Thapa  Outcome: Progressing  9/17/2024 1503 by LAUREANO Thapa  Reactivated  Goal: Patient will complete toileting including hygiene clothing management/hygiene with modified independent level of assistance and grab bars.  9/17/2024 1504 by LAUREANO Thapa  Outcome: Progressing  9/17/2024 1503 by LAUREANO Thapa  Reactivated     Problem: TRANSFERS  Goal: Patient will complete functional transfer to toilet with least restrictive device with modified independent level of assistance.  9/17/2024 1504 by LAUREANO Thapa  Outcome: Progressing  9/17/2024 1503 by LAUREANO Thapa  Reactivated

## 2024-09-17 NOTE — PROGRESS NOTES
Nils Hogan is a 54 y.o. male on day 6 of admission presenting with Malaise and fatigue.      Subjective   Better wants to go home, potassium low       Objective     Last Recorded Vitals  /63 (BP Location: Right arm, Patient Position: Lying)   Pulse 78   Temp 36.5 °C (97.7 °F) (Oral)   Resp 16   Wt 72.7 kg (160 lb 4.4 oz)   SpO2 96%   Intake/Output last 3 Shifts:    Intake/Output Summary (Last 24 hours) at 9/17/2024 1338  Last data filed at 9/17/2024 1218  Gross per 24 hour   Intake 943.33 ml   Output 1400 ml   Net -456.67 ml       Admission Weight  Weight: 71.7 kg (158 lb) (09/11/24 1424)    Daily Weight  09/17/24 : 72.7 kg (160 lb 4.4 oz)    Image Results  NM Lung perfusion with spect  Narrative: Interpreted By:  Merary Benjamin and Omar Mahmoud   STUDY:  NM LUNG PERFUSION WITH SPECT;  9/17/2024 8:39 am      INDICATION:  Signs/Symptoms:pulmonary embolism.          COMPARISON:  None.      ACCESSION NUMBER(S):  [id number]      ORDERING CLINICIAN:  ALYX CAMILO      TECHNIQUE:  DIVISION OF NUCLEAR MEDICINE  PERFUSION LUNG SCAN      Multiple perfusion images of the lungs were obtained after the  intravenous administration of 4.2 mCi of Tc-99m MAA. SPECT/CT images  of the lungs were also obtained      FINDINGS:  Perfusion demonstrate a heterogeneous distribution of  radiopharmaceutical throughout the lung fields.      Nonsegmental perfusion defects in both lungs, likely corresponding to  moderate left and small right pleural effusions. Otherwise, no  wedge-shaped subsegmental or segmental perfusion defects in both  lungs to suggest acute pulmonary embolism (low probability).      Impression: 1. No wedge-shaped subsegmental or segmental perfusion defects in  both lungs to suggest acute pulmonary embolism (low probability).  2. Nonsegmental perfusion defects in both lungs, likely corresponding  to moderate left and small right pleural effusions.      I personally reviewed the images/study and I agree with  the findings  as stated by Rubens Smith MD. This study was interpreted at  [facility], [place], [place].      MACRO:  None      Signed by: Merary Benjamin 9/17/2024 11:43 AM  Dictation workstation:   [id number]      Physical Exam/better    Relevant Results               Assessment/Plan                  Assessment & Plan  Malaise and fatigue  COVID positive   Xray of chest revealed improved left-sided pleural effusion and infiltrate with however  moderate residual. Echo completed on 09/04/2024 which was normal.   -Ordered Remdesivir  -Continuous telemetry  -Ordered continuous supplemental oxygenation PRN, >92%  -PT/OT to evaluate ad treat  -Consulted ID   Urinary tract infection  UA was positive for WBC and RBC  -Ordered Rocephin, per previous notes has anaphylactic allergy to zosyn but has tolerated meropenem, keflex,ceftriaxone. Monitor for adverse reactions.  Hypokalemia  -Ordered oral potassium replacement  -Monitor   Anemia  No signs and symptoms of bleeding  -HGB stable  -Monitor  Alcoholic cirrhosis of liver with ascites (Multi)  ALT 47    Stable  -Continue home medications, Lasix and Aldactone    -Low Na diet  Hypertension  -Continue home medication, Lopressor                Barbara Brunson MD

## 2024-09-17 NOTE — PROGRESS NOTES
Nils Hogan is a 54 y.o. male on day 6 of admission presenting with Malaise and fatigue.      Subjective   I ordered iv potassium last night, the nurse gave oral, also gave the lasix this am before dc, mg ok       Objective     Last Recorded Vitals  /63 (BP Location: Right arm, Patient Position: Lying)   Pulse 78   Temp 36.5 °C (97.7 °F) (Oral)   Resp 16   Wt 72.7 kg (160 lb 4.4 oz)   SpO2 96%   Intake/Output last 3 Shifts:    Intake/Output Summary (Last 24 hours) at 9/17/2024 1243  Last data filed at 9/17/2024 1218  Gross per 24 hour   Intake 943.33 ml   Output 1700 ml   Net -756.67 ml       Admission Weight  Weight: 71.7 kg (158 lb) (09/11/24 1424)    Daily Weight  09/17/24 : 72.7 kg (160 lb 4.4 oz)    Image Results  NM Lung perfusion with spect  Narrative: Interpreted By:  Merary Benjamin and Omar Mahmoud   STUDY:  NM LUNG PERFUSION WITH SPECT;  9/17/2024 8:39 am      INDICATION:  Signs/Symptoms:pulmonary embolism.          COMPARISON:  None.      ACCESSION NUMBER(S):  [id number]      ORDERING CLINICIAN:  ALYX CAMILO      TECHNIQUE:  DIVISION OF NUCLEAR MEDICINE  PERFUSION LUNG SCAN      Multiple perfusion images of the lungs were obtained after the  intravenous administration of 4.2 mCi of Tc-99m MAA. SPECT/CT images  of the lungs were also obtained      FINDINGS:  Perfusion demonstrate a heterogeneous distribution of  radiopharmaceutical throughout the lung fields.      Nonsegmental perfusion defects in both lungs, likely corresponding to  moderate left and small right pleural effusions. Otherwise, no  wedge-shaped subsegmental or segmental perfusion defects in both  lungs to suggest acute pulmonary embolism (low probability).      Impression: 1. No wedge-shaped subsegmental or segmental perfusion defects in  both lungs to suggest acute pulmonary embolism (low probability).  2. Nonsegmental perfusion defects in both lungs, likely corresponding  to moderate left and small right pleural effusions.       I personally reviewed the images/study and I agree with the findings  as stated by Rubens Smith MD. This study was interpreted at  [facility], [place], [place].      MACRO:  None      Signed by: Merary Benjamin 9/17/2024 11:43 AM  Dictation workstation:   [id number]      Physical Examoff o2 in bed    Relevant Results               Assessment/Plan                  Assessment & Plan  Malaise and fatigue  COVID positive   Xray of chest revealed improved left-sided pleural effusion and infiltrate with however  moderate residual. Echo completed on 09/04/2024 which was normal.   -Ordered Remdesivir  -Continuous telemetry  -Ordered continuous supplemental oxygenation PRN, >92%  -PT/OT to evaluate ad treat  -Consulted ID   Urinary tract infection  UA was positive for WBC and RBC  -Ordered Rocephin, per previous notes has anaphylactic allergy to zosyn but has tolerated meropenem, keflex,ceftriaxone. Monitor for adverse reactions.  Hypokalemia  -Ordered oral potassium replacement  -Monitor   Anemia  No signs and symptoms of bleeding  -HGB stable  -Monitor  Alcoholic cirrhosis of liver with ascites (Multi)  ALT 47    Stable  -Continue home medications, Lasix and Aldactone    -Low Na diet  Hypertension  -Continue home medication, Lopressor  Hypokalemia,repeat k              Barbara Brunson MD

## 2024-09-17 NOTE — NURSING NOTE
This nurse attempted to get his 10pm lab work, but was unsuccessful. Called the rapid response nurse to see if he is able to.    2315: rapid response nurse at bedside  attempting to get lab work now.

## 2024-09-17 NOTE — PROGRESS NOTES
Nils Hogan is a 54 y.o. male on day 5 of admission presenting with Malaise and fatigue.      Subjective   Hypokalemia, hypomagnesamia       Objective     Last Recorded Vitals  BP 93/56 (BP Location: Left arm, Patient Position: Lying)   Pulse 68   Temp 36.4 °C (97.5 °F) (Oral)   Resp 17   Wt 72.5 kg (159 lb 13.3 oz)   SpO2 94%   Intake/Output last 3 Shifts:    Intake/Output Summary (Last 24 hours) at 9/16/2024 2039  Last data filed at 9/16/2024 1838  Gross per 24 hour   Intake 690 ml   Output 1725 ml   Net -1035 ml       Admission Weight  Weight: 71.7 kg (158 lb) (09/11/24 1424)    Daily Weight  09/12/24 : 72.5 kg (159 lb 13.3 oz)    Image Results  ECG 12 lead  Normal sinus rhythm  Normal ECG    Confirmed by Danielle Perez ([id number]) on 9/15/2024 10:31:16 PM      Physical Exam    Relevant Results               Assessment/Plan                  Assessment & Plan  Malaise and fatigue  COVID positive   Xray of chest revealed improved left-sided pleural effusion and infiltrate with however  moderate residual. Echo completed on 09/04/2024 which was normal.   -Ordered Remdesivir  -Continuous telemetry  -Ordered continuous supplemental oxygenation PRN, >92%  -PT/OT to evaluate ad treat  -Consulted ID   Urinary tract infection  UA was positive for WBC and RBC  -Ordered Rocephin, per previous notes has anaphylactic allergy to zosyn but has tolerated meropenem, keflex,ceftriaxone. Monitor for adverse reactions.  Hypokalemia  -Ordered oral potassium replacement  -Monitor   Anemia  No signs and symptoms of bleeding  -HGB stable  -Monitor  Alcoholic cirrhosis of liver with ascites (Multi)  ALT 47    Stable  -Continue home medications, Lasix and Aldactone    -Low Na diet  Hypertension  -Continue home medication, Lopressor    Low k low mg replace            Barbara Brunson MD

## 2024-09-17 NOTE — PROGRESS NOTES
Nils Hogan is a 54 y.o. male on day 6 of admission presenting with Malaise and fatigue.      Subjective  Patient lying in bed in no distress  On 2 L of oxygen  Eager for discharge home  Denies worsening shortness of breath, nausea or vomiting    Objective        Last Recorded Vitals      9/16/2024     8:45 AM 9/16/2024    12:15 PM 9/16/2024     5:00 PM 9/16/2024     8:46 PM 9/16/2024    11:03 PM 9/17/2024     3:56 AM 9/17/2024     8:36 AM   Vitals   Systolic   110 101 108  113   Diastolic   66 60 63  61   Heart Rate       72   Temp 36.6 °C (97.8 °F) 36.4 °C (97.5 °F) 36.4 °C (97.5 °F) 36.6 °C (97.9 °F) 36.4 °C (97.5 °F) 36.5 °C (97.7 °F) 36.3 °C (97.3 °F)   Resp 18 17 18 18 19  17   Weight (lb)      160.27    BMI      24.38 kg/m2    BSA (m2)      1.87 m2        Imaging  No results found.      Relevant Results  Results for orders placed or performed during the hospital encounter of 09/11/24 (from the past 24 hour(s))   Basic Metabolic Panel   Result Value Ref Range    Glucose 218 (H) 65 - 99 mg/dL    Sodium 136 133 - 145 mmol/L    Potassium 2.8 (LL) 3.4 - 5.1 mmol/L    Chloride 96 (L) 97 - 107 mmol/L    Bicarbonate 28 24 - 31 mmol/L    Urea Nitrogen 10 8 - 25 mg/dL    Creatinine 0.40 0.40 - 1.60 mg/dL    eGFR >90 >60 mL/min/1.73m*2    Calcium 7.5 (L) 8.5 - 10.4 mg/dL    Anion Gap 12 <=19 mmol/L   Magnesium   Result Value Ref Range    Magnesium 1.20 (L) 1.60 - 3.10 mg/dL   D-dimer, Non VTE   Result Value Ref Range    D-Dimer Non VTE, Quant (mg/L FEU) 5.78 (H) 0.19 - 0.50 mg/L FEU   Potassium   Result Value Ref Range    Potassium 2.7 (LL) 3.4 - 5.1 mmol/L   Magnesium   Result Value Ref Range    Magnesium 1.70 1.60 - 3.10 mg/dL   CBC   Result Value Ref Range    WBC 7.9 4.4 - 11.3 x10*3/uL    nRBC 0.0 0.0 - 0.0 /100 WBCs    RBC 3.63 (L) 4.50 - 5.90 x10*6/uL    Hemoglobin 12.6 (L) 13.5 - 17.5 g/dL    Hematocrit 35.7 (L) 41.0 - 52.0 %    MCV 98 80 - 100 fL    MCH 34.7 (H) 26.0 - 34.0 pg    MCHC 35.3 32.0 - 36.0 g/dL     RDW 15.7 (H) 11.5 - 14.5 %    Platelets 120 (L) 150 - 450 x10*3/uL   Comprehensive metabolic panel   Result Value Ref Range    Glucose 174 (H) 65 - 99 mg/dL    Sodium 136 133 - 145 mmol/L    Potassium 2.8 (LL) 3.4 - 5.1 mmol/L    Chloride 96 (L) 97 - 107 mmol/L    Bicarbonate 29 24 - 31 mmol/L    Urea Nitrogen 9 8 - 25 mg/dL    Creatinine 0.40 0.40 - 1.60 mg/dL    eGFR >90 >60 mL/min/1.73m*2    Calcium 7.5 (L) 8.5 - 10.4 mg/dL    Albumin 2.0 (L) 3.5 - 5.0 g/dL    Alkaline Phosphatase 191 (H) 35 - 125 U/L    Total Protein 6.4 5.9 - 7.9 g/dL    AST 71 (H) 5 - 40 U/L    Bilirubin, Total 3.3 (H) 0.1 - 1.2 mg/dL    ALT 30 5 - 40 U/L    Anion Gap 11 <=19 mmol/L       Constitutional:  Alert and oriented to person, place, date/time in no acute distress.  HEENT:  Atraumatic, normocephalic. PERRL. EOMI.  Nares patent.  Mucous membranes moist.    Neck:  Trachea midline.  Respiratory:  Clear to auscultation.  Cardiac:  Regular rate and rhythm.    Cardiovascular:  No edema of the extremities.  Pulse exam: Radial and femoral pulses palpable bilateral. Pedal pulses:  Abdominal:  Soft, nontender, nondistended, bowel sounds present.  Musculoskeletal:  Moves extremities freely.  Dermatological: Clean and dry   Neurological: Alert and oriented to person, place, date/time  Psych:  Calm, cooperative    No results found for the last 90 days.    Assessment/Plan  Acute hypoxic respiratory failure, resolved  Coronavirus infection  History of cirrhosis with ascites -history of paracentesis  Allergy to zosyn-tolerating ceftriaxone  Ventral hernia, management by General Surgery      Continue ceftriaxone, while inpatient-discontinue upon discharge  Completed 5 days of remdesivir  Continue dexamethasone-total of 10 days  Oxygen as needed  Droplet plus precaution  Monitor WBC and temperature  Monitor  liver enzymes  Supportive care  Discharge planning    Total time spent caring for the patient today was 20 minutes.  This includes time spent  before the visit reviewing the chart, time spent during the visit, and time spent after the visit on documentation.

## 2024-09-17 NOTE — NURSING NOTE
Assumed care of pt around this time.  Pt is alert, lying in bed, watching tv.  RT going into pt room.  Respirations even and unlabored on 2L O2 NC with no s/s of distress.   IV fluids running.  Pt denies any pain or needs at the moment.  Call light and belongings within reach.  Will be continuing to monitor.

## 2024-09-17 NOTE — PROGRESS NOTES
[facility] verified that he did not want to adjust the dc plan which has been for him to return home and resume services with [facility]. Pt states he has all the DME equipment he needs at home including a life alert as well as a neighbor across the street who will check on him and that he still wants to return home. Plan continues to be for pt to resume with [facility] at [place], they are following.      09/17/24 8993   Discharge Planning   Expected Discharge Disposition Home Health  ([facility])

## 2024-09-17 NOTE — CARE PLAN
Problem: Respiratory  Goal: Clear secretions with interventions this shift  Outcome: Progressing  Goal: Minimize anxiety/maximize coping throughout shift  Outcome: Progressing  Goal: Minimal/no exertional discomfort or dyspnea this shift  Outcome: Progressing  Goal: No signs of respiratory distress (eg. Use of accessory muscles. Peds grunting)  Outcome: Progressing  Goal: Patent airway maintained this shift  Outcome: Progressing  Goal: Verbalize decreased shortness of breath this shift  Outcome: Progressing  Goal: Wean oxygen to maintain O2 saturation per order/standard this shift  Outcome: Progressing  Goal: Increase self care and/or family involvement in next 24 hours  Outcome: Progressing     Problem: Respiratory  Goal: Clear secretions with interventions this shift  Outcome: Progressing  Goal: Minimize anxiety/maximize coping throughout shift  Outcome: Progressing  Goal: Minimal/no exertional discomfort or dyspnea this shift  Outcome: Progressing  Goal: No signs of respiratory distress (eg. Use of accessory muscles. Peds grunting)  Outcome: Progressing  Goal: Patent airway maintained this shift  Outcome: Progressing  Goal: Verbalize decreased shortness of breath this shift  Outcome: Progressing  Goal: Wean oxygen to maintain O2 saturation per order/standard this shift  Outcome: Progressing  Goal: Increase self care and/or family involvement in next 24 hours  Outcome: Progressing     Problem: Respiratory  Goal: Clear secretions with interventions this shift  Outcome: Progressing  Goal: Minimize anxiety/maximize coping throughout shift  Outcome: Progressing  Goal: Minimal/no exertional discomfort or dyspnea this shift  Outcome: Progressing  Goal: No signs of respiratory distress (eg. Use of accessory muscles. Peds grunting)  Outcome: Progressing  Goal: Patent airway maintained this shift  Outcome: Progressing  Goal: Verbalize decreased shortness of breath this shift  Outcome: Progressing  Goal: Wean oxygen to  maintain O2 saturation per order/standard this shift  Outcome: Progressing  Goal: Increase self care and/or family involvement in next 24 hours  Outcome: Progressing

## 2024-09-17 NOTE — SIGNIFICANT EVENT
Home oxygen certification complete.  ALEJANDRO Blood.TEE. and respiratory in room walking patient on 21%.  Patient does not qualify for home going oxygen, sp02 94% at rest and 89% on ambulation.

## 2024-09-17 NOTE — PROGRESS NOTES
"Nutrition Follow up Note    Nutrition Assessment      Patient remains in Covid19 isolation. Active discharge order. PO intake 100% most meals.    Nutrition History:     Energy Intake: Good > 75 %  Food Allergies/Intolerances:  None  GI Symptoms: None  Oral Problems: None    Anthropometrics:  Ht: 172.7 cm (5' 7.99\"), Wt: 72.7 kg (160 lb 4.4 oz), BMI: 24.38  IBW/kg (Dietitian Calculated): 70 kg  Percent of IBW: 104 %     Weight Change:  Daily Weight  09/17/24 : 72.7 kg (160 lb 4.4 oz)  09/09/24 : 71.7 kg (158 lb)  09/04/24 : 71.8 kg (158 lb 4.6 oz)  06/08/24 : 71.2 kg (157 lb)  06/05/24 : 71.2 kg (157 lb)  05/29/24 : 73.5 kg (162 lb)     Weight History / % Weight Change: chart shows stable weight between 157-162# over 4 months     Nutrition Focused Physical Exam Findings:      Edema  Edema: +2 mild, ascites  Edema Location: BLE    Nutrition Significant Labs:  Lab Results   Component Value Date    WBC 7.9 09/17/2024    HGB 12.6 (L) 09/17/2024    HCT 35.7 (L) 09/17/2024     (L) 09/17/2024    ALT 30 09/17/2024    AST 71 (H) 09/17/2024     09/17/2024    K 2.8 (LL) 09/17/2024    CL 96 (L) 09/17/2024    CREATININE 0.40 09/17/2024    BUN 9 09/17/2024    CO2 29 09/17/2024    INR 2.0 (H) 08/30/2024     Nutrition Specific Medications:  cefTRIAXone, 1 g, intravenous, q24h CHARISSA  dexAMETHasone, 6 mg, oral, Daily  enoxaparin, 40 mg, subcutaneous, Daily  ipratropium-albuteroL, 3 mL, nebulization, TID  metoprolol tartrate, 25 mg, oral, BID  mirtazapine, 30 mg, oral, Nightly  pantoprazole, 40 mg, oral, Daily before breakfast  potassium chloride, 40 mEq, oral, Once  QUEtiapine, 50 mg, oral, Nightly  spironolactone, 50 mg, oral, Daily         Dietary Orders (From admission, onward)       Start     Ordered    09/11/24 2213  Adult diet 2-3 grams sodium  Diet effective now        Question:  Diet type  Answer:  2-3 grams sodium    09/11/24 2213                   Estimated Needs:   Estimated Energy Needs  Total Energy Estimated " Needs (kCal): 2175 kCal  Total Estimated Energy Need per Day (kCal/kg): 30 kCal/kg  Method for Estimating Needs: actual wt    Estimated Protein Needs  Total Protein Estimated Needs (g): 87 g  Total Protein Estimated Needs (g/kg): 1.2 g/kg  Method for Estimating Needs: actual wt    Estimated Fluid Needs  Method for Estimating Needs: < 2000 mL/d      Nutrition Diagnosis   Nutrition Diagnosis:     Nutrition Diagnosis  Patient has Nutrition Diagnosis: Yes  Diagnosis Status (1): Ongoing  Nutrition Diagnosis 1: Increased nutrient needs  Related to (1): increased demand for nutrient  As Evidenced by (1): cirrhosis     Nutrition Interventions/Recommendations   Nutrition Interventions and Recommendations:    Nutrition Prescription:  Individualized Nutrition Prescription Provided for : 2175 kcals, 87 g protein via diet    Nutrition Interventions:   Food and/or Nutrient Delivery Interventions  Interventions: Meals and snacks  Meals and Snacks: Mineral-modified diet  Goal: provide diet as ordered    Education Documentation  No documentation found.         Nutrition Monitoring and Evaluation   Monitoring/Evaluation:   Food/Nutrient Related History Monitoring  Monitoring and Evaluation Plan: Energy intake  Energy Intake: Estimated energy intake  Criteria: pt to consume >/= 75% estimated needs       Time Spent/Follow-up:   Follow Up  Time Spent (min): 20 minutes  Last Date of Nutrition Visit: 09/17/24  Nutrition Follow-Up Needed?: 7-10 days  Follow up Comment: 9/24/24

## 2024-09-17 NOTE — PROGRESS NOTES
Physical Therapy                 Therapy Communication Note    Patient Name: Nils Hogan  MRN: [id number]  Department: [facility] CT  Room: [address] [place], [place] [address]  Today's Date: 9/17/2024     Discipline: Physical Therapy    Missed Visit Reason: Missed Visit Reason: Patient in a medical procedure (pt is currently off the floor for CT. Potassium is still low.)    Missed Time: Cancel    Comment: PT treatment deferred at this time.

## 2024-09-18 NOTE — PROGRESS NOTES
"    Pulmonary Consult    Reason For Consult  abnormal CT COVID-19   History Of Present Illness  Nils Hogan is a 54 y.o. male presenting with Malaise and fatigue. Presented for weakness shortness of breath and fatigue for the past couple days. Paracentesis in August 31 of this year.   Chest x-ray showed left-sided pleural effusion infiltrates.  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    Surgical History  He has a past surgical history that includes Adenoidectomy; Whipple procedure (04/22/2019); and Urethra surgery.     Social History  He reports that he quit smoking about 6 weeks ago. His smoking use included cigarettes. He started smoking about 37 years ago. He has a 9.4 pack-year smoking history. He does not have any smokeless tobacco history on file. He reports current alcohol use. No history on file for drug use.      Family History  Family History   Problem Relation Name Age of Onset    Stroke Mother      Bipolar disorder Mother      Prostate cancer Father      Hypertension Sister      Hyperlipidemia Sister      Asthma Son      Colon cancer Paternal Grandfather          Allergies  Diazepam, Iodinated contrast media, Iodine, Zosyn [piperacillin-tazobactam], Acetaminophen, and Phenytoin sodium extended    Review of Systems   All other systems reviewed and are negative.      Last Recorded Vitals  Blood pressure 115/61, pulse 80, temperature 36.7 °C (98.1 °F), temperature source Oral, resp. rate 17, height 1.727 m (5' 7.99\"), weight 72.7 kg (160 lb 4.4 oz), SpO2 94%.    Intake/Output    Intake/Output Summary (Last 24 hours) at 9/17/2024 2213  Last data filed at 9/17/2024 1543  Gross per 24 hour   Intake 1253.33 ml   Output 825 ml   Net 428.33 ml       Physical Exam  Vitals reviewed.   Constitutional:       " Appearance: Normal appearance.   HENT:      Head: Normocephalic and atraumatic.      Mouth/Throat:      Mouth: Mucous membranes are moist.   Eyes:      Extraocular Movements: Extraocular movements intact.      Pupils: Pupils are equal, round, and reactive to light.   Cardiovascular:      Rate and Rhythm: Normal rate and regular rhythm.   Pulmonary:      Effort: Pulmonary effort is normal.      Breath sounds: Normal air entry. Examination of the left-middle field reveals decreased breath sounds. Examination of the left-lower field reveals decreased breath sounds. Decreased breath sounds present.   Abdominal:      General: Abdomen is flat. Bowel sounds are normal.      Palpations: Abdomen is soft.   Musculoskeletal:         General: Normal range of motion.      Cervical back: Normal range of motion and neck supple.   Skin:     General: Skin is warm and dry.   Neurological:      General: No focal deficit present.      Mental Status: He is alert and oriented to person, place, and time. Mental status is at baseline.      ...    Oxygen Therapy  SpO2: 94 %  Medical Gas Therapy: Supplemental oxygen  Medical Gas Delivery Method: Nasal cannula       Lines and Tubes:         Scheduled medications  dexAMETHasone, 6 mg, oral, Daily  enoxaparin, 40 mg, subcutaneous, Daily  ipratropium-albuteroL, 3 mL, nebulization, TID  metoprolol tartrate, 25 mg, oral, BID  mirtazapine, 30 mg, oral, Nightly  pantoprazole, 40 mg, oral, Daily before breakfast  QUEtiapine, 50 mg, oral, Nightly  spironolactone, 50 mg, oral, Daily      Continuous medications     PRN medications  PRN medications: acetaminophen, morphine, ondansetron **OR** ondansetron, oxygen, polyethylene glycol    Relevant Results  Results from last 7 days   Lab Units 09/17/24  0505 09/14/24  0557 09/13/24  0523   WBC AUTO x10*3/uL 7.9 7.6 2.8*   HEMOGLOBIN g/dL 12.6* 12.8* 12.1*   HEMATOCRIT % 35.7* 36.0* 35.1*   PLATELETS AUTO x10*3/uL 120* 90* 68*      Results from last 7 days  "  Lab Units 09/17/24  1312 09/17/24  0505 09/16/24  2323 09/16/24  1352 09/14/24  0557   SODIUM mmol/L  --  136  --  136 139   POTASSIUM mmol/L 3.3* 2.8* 2.7* 2.8* 3.1*   CHLORIDE mmol/L  --  96*  --  96* 101   CO2 mmol/L  --  29  --  28 29   BUN mg/dL  --  9  --  10 7*   CREATININE mg/dL  --  0.40  --  0.40 0.40   GLUCOSE mg/dL  --  174*  --  218* 119*   CALCIUM mg/dL  --  7.5*  --  7.5* 7.8*          XR chest 2 views 09/14/2024    Narrative  Interpreted By:  Tamir Luevano,  STUDY:  XR CHEST 2 VIEWS; 9/14/2024 7:54 am    INDICATION:  Signs/Symptoms:infiltrates    COMPARISON:  09/11/2024.    ACCESSION NUMBER(S):  [id number]    ORDERING CLINICIAN:  ALYX CAMILO    TECHNIQUE:  Number of films: Two-view radiographs of the chest were obtained.    FINDINGS:  The heart and mediastinum are normal.  There is left pleural effusion and left basilar  infiltrates/atelectatic changes; allowing for differences in  technique there is worsening since the prior exam. The right lung is  clear. No pneumothorax is seen.  The osseous structures are unremarkable.    Impression  Worsening left effusion and left basilar infiltrates/atelectatic  changes; correlate clinically and follow-up as needed.    Signed by: Tamir Luevano 9/14/2024 1:01 PM  Dictation workstation:   [id number]    Pulmonary Functions Testing Results:    No results found for: \"FEV1\", \"FVC\", \"CSX9EOB\", \"TLC\", \"DLCO\"    [unfilled]   Assessment/Plan   Assessment & Plan  Malaise and fatigue    Hypokalemia    Urinary tract infection    Anemia    Alcoholic cirrhosis of liver with ascites (Multi)    Hypertension      Acute hypoxic respiratory failure, resolved  Coronavirus infection  History of cirrhosis with ascites -history of paracentesis  Allergy to zosyn-tolerating ceftriaxone  Ventral hernia, management by General Surgery     On 2 L of oxygen.  Continue dexamethasone.  Wean oxygen as tolerated.  Complete 5 days of remdesivir.  Any follow-up chest imaging.     " Question pleural effusion and need for thoracentesis.  CT scan nuclear medicine scan.  Awaiting follow-up  Joshua Villegas MD

## 2024-09-19 LAB
ACID FAST STN SPEC: NORMAL
MYCOBACTERIUM SPEC CULT: NORMAL

## 2024-09-25 LAB
ACID FAST STN SPEC: NORMAL
MYCOBACTERIUM SPEC CULT: NORMAL

## 2024-10-02 LAB
ACID FAST STN SPEC: NORMAL
MYCOBACTERIUM SPEC CULT: NORMAL

## 2024-10-09 LAB
ACID FAST STN SPEC: NORMAL
MYCOBACTERIUM SPEC CULT: NORMAL

## 2024-10-09 NOTE — DISCHARGE SUMMARY
Discharge Diagnosis  Malaise and fatigue    Issues Requiring Follow-Up  covid    Discharge Meds     Medication List      START taking these medications     dexAMETHasone 4 mg tablet; Commonly known as: Decadron; Take 1 and 1/2   tablets (6 mg) by mouth once daily. Do not fill before September 17, 2024.   ipratropium-albuteroL 0.5-2.5 mg/3 mL nebulizer solution; Commonly known   as: Duo-Neb; Inhale 1 vial (3 mL) by nebulization 3 times a day.   oxygen gas therapy; Commonly known as: O2; Inhale 1 each once every 24   hours.     CHANGE how you take these medications     furosemide 40 mg tablet; Commonly known as: Lasix; Take 1 tablet (40 mg)   by mouth once daily.; What changed: when to take this     CONTINUE taking these medications     ascorbic acid 1,000 mg tablet; Commonly known as: Vitamin C   aspirin 81 mg EC tablet   D3-2000 50 mcg (2,000 unit) capsule; Generic drug: cholecalciferol   metoprolol tartrate 25 mg tablet; Commonly known as: Lopressor; Take 1   tablet (25 mg) by mouth 2 times a day.   mirtazapine 30 mg tablet; Commonly known as: Remeron; Take 1 tablet (30   mg) by mouth once daily at bedtime.   pantoprazole 40 mg EC tablet; Commonly known as: ProtoNix   QUEtiapine 100 mg tablet; Commonly known as: SEROquel; Take 0.5 tablets   (50 mg) by mouth once daily at bedtime.   spironolactone 50 mg tablet; Commonly known as: Aldactone; Take 1 tablet   (50 mg) by mouth once daily.     STOP taking these medications     cefuroxime 500 mg tablet; Commonly known as: Ceftin   doxycycline 100 mg in dextrose 5% 100 mL IV   doxycycline 100 mg tablet; Commonly known as: Vibra-Tabs       Test Results Pending At Discharge  Pending Labs       No current pending labs.            Hospital Course   better    Pertinent Physical Exam At Time of Discharge  Physical Exam/improoving    Outpatient Follow-Up  No future appointments.      Brabara Brunson MD

## 2024-10-16 LAB
ACID FAST STN SPEC: NORMAL
MYCOBACTERIUM SPEC CULT: NORMAL

## 2024-10-25 LAB
ACID FAST STN SPEC: NORMAL
MYCOBACTERIUM SPEC CULT: NORMAL